# Patient Record
Sex: MALE | Race: WHITE | Employment: OTHER | URBAN - METROPOLITAN AREA
[De-identification: names, ages, dates, MRNs, and addresses within clinical notes are randomized per-mention and may not be internally consistent; named-entity substitution may affect disease eponyms.]

---

## 2017-03-31 ENCOUNTER — APPOINTMENT (OUTPATIENT)
Dept: INFUSION THERAPY | Age: 82
End: 2017-03-31

## 2017-07-19 ENCOUNTER — OFFICE VISIT (OUTPATIENT)
Dept: CARDIOLOGY CLINIC | Age: 82
End: 2017-07-19

## 2017-07-19 VITALS
SYSTOLIC BLOOD PRESSURE: 101 MMHG | DIASTOLIC BLOOD PRESSURE: 53 MMHG | BODY MASS INDEX: 26.95 KG/M2 | HEART RATE: 59 BPM | WEIGHT: 199 LBS | HEIGHT: 72 IN

## 2017-07-19 DIAGNOSIS — I10 UNSPECIFIED ESSENTIAL HYPERTENSION: ICD-10-CM

## 2017-07-19 DIAGNOSIS — R94.31 ABNORMAL EKG: ICD-10-CM

## 2017-07-19 DIAGNOSIS — I50.9 ACUTE ON CHRONIC CONGESTIVE HEART FAILURE, UNSPECIFIED CONGESTIVE HEART FAILURE TYPE: Primary | ICD-10-CM

## 2017-07-19 RX ORDER — GLUCOSAMINE SULFATE 1500 MG
POWDER IN PACKET (EA) ORAL DAILY
COMMUNITY

## 2017-07-19 RX ORDER — CITALOPRAM 20 MG/1
TABLET, FILM COATED ORAL DAILY
COMMUNITY
End: 2021-01-25 | Stop reason: ALTCHOICE

## 2017-07-19 RX ORDER — ALFUZOSIN HYDROCHLORIDE 10 MG/1
TABLET, EXTENDED RELEASE ORAL DAILY
COMMUNITY
End: 2021-03-31

## 2017-07-19 RX ORDER — LEVOTHYROXINE SODIUM 25 UG/1
25 TABLET ORAL
COMMUNITY

## 2017-07-19 RX ORDER — MILK THISTLE SEED EXTRACT 200 MG
CAPSULE ORAL
COMMUNITY
End: 2017-08-31

## 2017-07-19 RX ORDER — CHOLECALCIFEROL (VITAMIN D3) 125 MCG
100 CAPSULE ORAL DAILY
COMMUNITY

## 2017-07-19 RX ORDER — BIOTIN 1000 MCG
TABLET,CHEWABLE ORAL
COMMUNITY

## 2017-07-19 RX ORDER — GLUCOSAM/CHONDRO/HERB 149/HYAL 750-100 MG
TABLET ORAL
COMMUNITY
End: 2019-09-13 | Stop reason: ALTCHOICE

## 2017-07-19 RX ORDER — TESTOSTERONE 50 MG/5G
5 GEL TRANSDERMAL DAILY
COMMUNITY
End: 2019-09-13 | Stop reason: ALTCHOICE

## 2017-07-19 NOTE — PATIENT INSTRUCTIONS
There are no discontinued medications. Orders Placed This Encounter    SCHEDULE NUCLEAR STUDY     exercise    2D ECHO COMPLETE ADULT (TTE)     Standing Status:   Future     Standing Expiration Date:   1/15/2018     Order Specific Question:   Reason for Exam:     Answer:   chf    AMB POC EKG ROUTINE W/ 12 LEADS, INTER & REP     Order Specific Question:   Reason for Exam:     Answer:   htn          Avoiding Triggers With Heart Failure: Care Instructions  Your Care Instructions  Triggers are anything that make your heart failure flare up. A flare-up is also called \"sudden heart failure\" or \"acute heart failure. \" When you have a flare-up, fluid builds up in your lungs, and you have problems breathing. You might need to go to the hospital. By watching for changes in your condition and avoiding triggers, you can prevent heart failure flare-ups. Follow-up care is a key part of your treatment and safety. Be sure to make and go to all appointments, and call your doctor if you are having problems. It's also a good idea to know your test results and keep a list of the medicines you take. How can you care for yourself at home? Watch for changes in your weight and condition  · Weigh yourself without clothing at the same time each day. Record your weight. Call your doctor if you have sudden weight gain, such as more than 2 to 3 pounds in a day or 5 pounds in a week. (Your doctor may suggest a different range of weight gain.) A sudden weight gain may mean that your heart failure is getting worse. · Keep a daily record of your symptoms. Write down any changes in how you feel, such as new shortness of breath, cough, or problems eating. Also record if your ankles are more swollen than usual and if you feel more tired than usual. Note anything that you ate or did that could have triggered these changes. Limit sodium  Sodium causes your body to hold on to extra water. This may cause your heart failure symptoms to get worse. People get most of their sodium from processed foods. Fast food and restaurant meals also tend to be very high in sodium. · Your doctor may suggest that you limit sodium to 2,000 milligrams (mg) a day or less. That is less than 1 teaspoon of salt a day, including all the salt you eat in cooking or in packaged foods. · Read food labels on cans and food packages. They tell you how much sodium you get in one serving. Check the serving size. If you eat more than one serving, you are getting more sodium. · Be aware that sodium can come in forms other than salt, including monosodium glutamate (MSG), sodium citrate, and sodium bicarbonate (baking soda). MSG is often added to Asian food. You can sometimes ask for food without MSG or salt. · Slowly reducing salt will help you adjust to the taste. Take the salt shaker off the table. · Flavor your food with garlic, lemon juice, onion, vinegar, herbs, and spices instead of salt. Do not use soy sauce, steak sauce, onion salt, garlic salt, mustard, or ketchup on your food, unless it is labeled \"low-sodium\" or \"low-salt. \"  · Make your own salad dressings, sauces, and ketchup without adding salt. · Use fresh or frozen ingredients, instead of canned ones, whenever you can. Choose low-sodium canned goods. · Eat less processed food and food from restaurants, including fast food. Exercise as directed  Moderate, regular exercise is very good for your heart. It improves your blood flow and helps control your weight. But too much exercise can stress your heart and cause a heart failure flare-up. · Check with your doctor before you start an exercise program.  · Walking is an easy way to get exercise. Start out slowly. Gradually increase the length and pace of your walk. Swimming, riding a bike, and using a treadmill are also good forms of exercise. · When you exercise, watch for signs that your heart is working too hard.  You are pushing yourself too hard if you cannot talk while you are exercising. If you become short of breath or dizzy or have chest pain, stop, sit down, and rest.  · Do not exercise when you do not feel well. Take medicines correctly  · Take your medicines exactly as prescribed. Call your doctor if you think you are having a problem with your medicine. · Make a list of all the medicines you take. Include those prescribed to you by other doctors and any over-the-counter medicines, vitamins, or supplements you take. Take this list with you when you go to any doctor. · Take your medicines at the same time every day. It may help you to post a list of all the medicines you take every day and what time of day you take them. · Make taking your medicine as simple as you can. Plan times to take your medicines when you are doing other things, such as eating a meal or getting ready for bed. This will make it easier to remember to take your medicines. · Get organized. Use helpful tools, such as daily or weekly pill containers. When should you call for help? Call 911 if you have symptoms of sudden heart failure such as:  · You have severe trouble breathing. · You cough up pink, foamy mucus. · You have a new irregular or rapid heartbeat. Call your doctor now or seek immediate medical care if:  · You have new or increased shortness of breath. · You are dizzy or lightheaded, or you feel like you may faint. · You have sudden weight gain, such as more than 2 to 3 pounds in a day or 5 pounds in a week. (Your doctor may suggest a different range of weight gain.)  · You have increased swelling in your legs, ankles, or feet. · You are suddenly so tired or weak that you cannot do your usual activities. Watch closely for changes in your health, and be sure to contact your doctor if you develop new symptoms. Where can you learn more? Go to http://magda-tyra.info/.   Enter U433 in the search box to learn more about \"Avoiding Triggers With Heart Failure: Care Instructions. \"  Current as of: February 23, 2017  Content Version: 11.3  © 4516-2861 [employer], [facility]. Care instructions adapted under license by [employer] (which disclaims liability or warranty for this information). If you have questions about a medical condition or this instruction, always ask your healthcare professional. [place] [address] any warranty or liability for your use of this information.

## 2017-07-19 NOTE — MR AVS SNAPSHOT
Visit Information Date & Time Provider Department Dept. Phone Encounter #  
 7/19/2017 10:15 AM Lidia Roberson MD [facility] [address] [place] [address] [id number] Follow-up Instructions Return in about 1 month (around 8/19/2017), or if symptoms worsen or fail to improve, for post test.  
  
Your Appointments 8/18/2017  7:45 AM  
PROCEDURE with CA ECHO [facility] (Lenell Bake) Appt Note: vega with nuc; vega with nuc [address], [address] [place] [address]  
[address], [address] [facility] [address] [address] 8/18/2017  8:00 AM  
PROCEDURE with CA NUC [facility] (Lenell Bake) Appt Note: vega with echo wt 199 same day f/u  
 [address] [facility], [address] [place] [address]  
[address], [address] [address]  
  
    
 8/31/2017 11:45 AM  
ESTABLISHED PATIENT with Lidia Roberson MD  
[facility]) Appt Note: post nuc/echo [address], [address] [place] [address]  
[address], [address] [place] [address] [facility] Upcoming Health Maintenance Date Due DTaP/Tdap/Td series (1 - Tdap) 8/9/1956 ZOSTER VACCINE AGE 60> 8/9/1996 GLAUCOMA SCREENING Q2Y 8/9/2001 Pneumococcal 65+ Low/Medium Risk (1 of 2 - PCV13) 8/9/2001 MEDICARE YEARLY EXAM 8/9/2001 INFLUENZA AGE 9 TO ADULT 8/1/2017 Allergies as of 7/19/2017  Review Complete On: 7/19/2017 By: Lidia Roberson MD  
 No Known Allergies Current Immunizations  Never Reviewed No immunizations on file. Not reviewed this visit You Were Diagnosed With   
  
 Codes Comments Acute on chronic congestive heart failure, unspecified congestive heart failure type ([facility] [address]    -  Primary ICD-10-CM: I50.9 ICD-9-CM: 428.0 adv to stop all herbal OTC products for now  Abnormal EKG     ICD-10-CM: R94.31 
 ICD-9-CM: 80.32 poss old ant MI Unspecified essential hypertension     ICD-10-CM: I10 
ICD-9-CM: 401.9 Vitals BP Pulse Height(growth percentile) Weight(growth percentile) BMI Smoking Status 101/53 (!) 59 6' (1.829 m) 199 lb (90.3 kg) 26.99 kg/m2 Former Smoker Vitals History BMI and BSA Data Body Mass Index Body Surface Area  
 26.99 kg/m 2 2.14 m 2 Your Updated Medication List  
  
   
This list is accurate as of: 7/19/17 12:43 PM.  Always use your most recent med list.  
  
  
  
  
 alfuzosin SR 10 mg SR tablet Commonly known as:  Reese Gell Take  by mouth daily. biotin 1,000 mcg Chew Take  by mouth.  
  
 citalopram 20 mg tablet Commonly known as:  Linnell Laura Take  by mouth daily. CO Q-10 100 mg capsule Generic drug:  co-enzyme Q-10 Take 100 mg by mouth daily. RUDY-C PO Take  by mouth.  
  
 levothyroxine 25 mcg tablet Commonly known as:  SYNTHROID Take  by mouth Daily (before breakfast). milk thistle seed extract 200 mg Cap Take  by mouth. Omega-3-DHA-EPA-Fish Oil 1,000 mg (120 mg-180 mg) Cap Take  by mouth. testosterone 50 mg/5 gram (1 %) gel Commonly known as:  ANDROGEL  
5 g by TransDERmal route daily. VITAMIN D3 1,000 unit Cap Generic drug:  cholecalciferol Take  by mouth daily. zinc 50 mg Tab tablet Take  by mouth daily. We Performed the Following AMB POC EKG ROUTINE W/ 12 LEADS, INTER & REP [79037 CPT(R)] SCHEDULE NUCLEAR STUDY [[id number] Custom] Comments:  
 exercise Follow-up Instructions Return in about 1 month (around 8/19/2017), or if symptoms worsen or fail to improve, for post test.  
  
To-Do List   
 Around 07/22/2017 Cardiac Services:  2D ECHO COMPLETE ADULT (TTE) Patient Instructions There are no discontinued medications. Orders Placed This Encounter  SCHEDULE NUCLEAR STUDY  
  exercise  2D ECHO COMPLETE ADULT (TTE) Standing Status:   Future Standing Expiration Date:   1/15/2018 Order Specific Question:   Reason for Exam: Answer:   chf  
 AMB POC EKG ROUTINE W/ 12 LEADS, INTER & REP Order Specific Question:   Reason for Exam: Answer:   htn Avoiding Triggers With Heart Failure: Care Instructions Your Care Instructions Triggers are anything that make your heart failure flare up. A flare-up is also called \"sudden heart failure\" or \"acute heart failure. \" When you have a flare-up, fluid builds up in your lungs, and you have problems breathing. You might need to go to the hospital. By watching for changes in your condition and avoiding triggers, you can prevent heart failure flare-ups. Follow-up care is a key part of your treatment and safety. Be sure to make and go to all appointments, and call your doctor if you are having problems. It's also a good idea to know your test results and keep a list of the medicines you take. How can you care for yourself at home? Watch for changes in your weight and condition · Weigh yourself without clothing at the same time each day. Record your weight. Call your doctor if you have sudden weight gain, such as more than 2 to 3 pounds in a day or 5 pounds in a week. (Your doctor may suggest a different range of weight gain.) A sudden weight gain may mean that your heart failure is getting worse. · Keep a daily record of your symptoms. Write down any changes in how you feel, such as new shortness of breath, cough, or problems eating. Also record if your ankles are more swollen than usual and if you feel more tired than usual. Note anything that you ate or did that could have triggered these changes. Limit sodium Sodium causes your body to hold on to extra water. This may cause your heart failure symptoms to get worse. People get most of their sodium from processed foods. Fast food and restaurant meals also tend to be very high in sodium. · Your doctor may suggest that you limit sodium to 2,000 milligrams (mg) a day or less. That is less than 1 teaspoon of salt a day, including all the salt you eat in cooking or in packaged foods. · Read food labels on cans and food packages. They tell you how much sodium you get in one serving. Check the serving size. If you eat more than one serving, you are getting more sodium. · Be aware that sodium can come in forms other than salt, including monosodium glutamate (MSG), sodium citrate, and sodium bicarbonate (baking soda). MSG is often added to Asian food. You can sometimes ask for food without MSG or salt. · Slowly reducing salt will help you adjust to the taste. Take the salt shaker off the table. · Flavor your food with garlic, lemon juice, onion, vinegar, herbs, and spices instead of salt. Do not use soy sauce, steak sauce, onion salt, garlic salt, mustard, or ketchup on your food, unless it is labeled \"low-sodium\" or \"low-salt. \" 
· Make your own salad dressings, sauces, and ketchup without adding salt. · Use fresh or frozen ingredients, instead of canned ones, whenever you can. Choose low-sodium canned goods. · Eat less processed food and food from restaurants, including fast food. Exercise as directed Moderate, regular exercise is very good for your heart. It improves your blood flow and helps control your weight. But too much exercise can stress your heart and cause a heart failure flare-up. · Check with your doctor before you start an exercise program. 
· Walking is an easy way to get exercise. Start out slowly. Gradually increase the length and pace of your walk. Swimming, riding a bike, and using a treadmill are also good forms of exercise. · When you exercise, watch for signs that your heart is working too hard. You are pushing yourself too hard if you cannot talk while you are exercising.  If you become short of breath or dizzy or have chest pain, stop, sit down, and rest. 
 · Do not exercise when you do not feel well. Take medicines correctly · Take your medicines exactly as prescribed. Call your doctor if you think you are having a problem with your medicine. · Make a list of all the medicines you take. Include those prescribed to you by other doctors and any over-the-counter medicines, vitamins, or supplements you take. Take this list with you when you go to any doctor. · Take your medicines at the same time every day. It may help you to post a list of all the medicines you take every day and what time of day you take them. · Make taking your medicine as simple as you can. Plan times to take your medicines when you are doing other things, such as eating a meal or getting ready for bed. This will make it easier to remember to take your medicines. · Get organized. Use helpful tools, such as daily or weekly pill containers. When should you call for help? Call 911 if you have symptoms of sudden heart failure such as: 
· You have severe trouble breathing. · You cough up pink, foamy mucus. · You have a new irregular or rapid heartbeat. Call your doctor now or seek immediate medical care if: 
· You have new or increased shortness of breath. · You are dizzy or lightheaded, or you feel like you may faint. · You have sudden weight gain, such as more than 2 to 3 pounds in a day or 5 pounds in a week. (Your doctor may suggest a different range of weight gain.) · You have increased swelling in your legs, ankles, or feet. · You are suddenly so tired or weak that you cannot do your usual activities. Watch closely for changes in your health, and be sure to contact your doctor if you develop new symptoms. Where can you learn more? Go to http://magda-tyra.info/. Enter U637 in the search box to learn more about \"Avoiding Triggers With Heart Failure: Care Instructions. \" Current as of: February 23, 2017 Content Version: 11.3 © 3993-8911 [employer]. Care instructions adapted under license by [employer] (which disclaims liability or warranty for this information). If you have questions about a medical condition or this instruction, always ask your healthcare professional. [address] any warranty or liability for your use of this information. Introducing [facility]! [facility] introduces Fooooo patient portal. Now you can access parts of your medical record, email your doctor's office, and request medication refills online. 1. In your internet browser, go to [email or web address] 2. Click on the First Time User? Click Here link in the Sign In box. You will see the New Member Sign Up page. 3. Enter your Fooooo Access Code exactly as it appears below. You will not need to use this code after youve completed the sign-up process. If you do not sign up before the expiration date, you must request a new code. · Fooooo Access Code: [email or web address] Expires: 10/17/2017 11:14 AM 
 
4. Enter the last four digits of your Social Security Number (xxxx) and Date of Birth (mm/dd/yyyy) as indicated and click Submit. You will be taken to the next sign-up page. 5. Create a Fooooo ID. This will be your Fooooo login ID and cannot be changed, so think of one that is secure and easy to remember. 6. Create a Fooooo password. You can change your password at any time. 7. Enter your Password Reset Question and Answer. This can be used at a later time if you forget your password. 8. Enter your e-mail address. You will receive e-mail notification when new information is available in [address] 9. Click Sign Up. You can now view and download portions of your medical record. 10. Click the Download Summary menu link to download a portable copy of your medical information.  
 
If you have questions, please visit the Frequently Asked Questions section of the Managed Systems. Remember, [employer] is NOT to be used for urgent needs. For medical emergencies, dial 911. Now available from your iPhone and Android! Please provide this summary of care documentation to your next provider. Your primary care clinician is listed as Renee Carvalho. If you have any questions after today's visit, please call [phone].

## 2017-07-19 NOTE — PROGRESS NOTES
HISTORY OF PRESENT ILLNESS  Kt Schneider is a [de-identified] y.o. male. New Patient   The history is provided by the patient. Associated symptoms include shortness of breath. Pertinent negatives include no chest pain and no headaches. Fatigue   The history is provided by the patient. This is a new problem. The current episode started more than 1 week ago (yrs). The problem occurs daily. The problem has been gradually worsening (1/17). Associated symptoms include shortness of breath. Pertinent negatives include no chest pain and no headaches. The symptoms are aggravated by walking (100-200 steps). The symptoms are relieved by rest. He has tried nothing for the symptoms. Leg Swelling   The history is provided by the patient. This is a new problem. The current episode started more than 1 week ago (2015). The problem occurs every several days. Associated symptoms include shortness of breath. Pertinent negatives include no chest pain and no headaches. The symptoms are aggravated by standing. The symptoms are relieved by sleep. He has tried nothing for the symptoms. Shortness of Breath   The history is provided by the patient. This is a new problem. The problem occurs intermittently. The current episode started more than 1 week ago. Associated symptoms include leg swelling. Pertinent negatives include no fever, no headaches, no cough, no wheezing, no PND, no orthopnea, no chest pain, no vomiting, no rash and no claudication. The problem's precipitants include exercise (25 squatts). Review of Systems   Constitutional: Positive for fatigue and malaise/fatigue. Negative for chills, fever and weight loss. HENT: Negative for nosebleeds. Eyes: Negative for discharge. Respiratory: Positive for shortness of breath. Negative for cough and wheezing. Cardiovascular: Positive for leg swelling. Negative for chest pain, palpitations, orthopnea, claudication and PND.    Gastrointestinal: Negative for diarrhea, nausea and vomiting. Genitourinary: Negative for dysuria and hematuria. Musculoskeletal: Negative for joint pain. Skin: Negative for rash. Neurological: Negative for dizziness, seizures, loss of consciousness and headaches. Endo/Heme/Allergies: Negative for polydipsia. Does not bruise/bleed easily. Psychiatric/Behavioral: Negative for depression and substance abuse. The patient does not have insomnia. No Known Allergies    History reviewed. No pertinent past medical history. Family History   Problem Relation Age of Onset    Heart Attack Neg Hx     Stroke Neg Hx        Social History   Substance Use Topics    Smoking status: Former Smoker     Quit date: 7/19/1982    Smokeless tobacco: Never Used    Alcohol use No        Current Outpatient Prescriptions   Medication Sig    levothyroxine (SYNTHROID) 25 mcg tablet Take  by mouth Daily (before breakfast).  citalopram (CELEXA) 20 mg tablet Take  by mouth daily.  alfuzosin SR (UROXATRAL) 10 mg SR tablet Take  by mouth daily.  testosterone (ANDROGEL) 50 mg/5 gram (1 %) gel 5 g by TransDERmal route daily.  milk thistle seed extract 200 mg cap Take  by mouth.  Omega-3-DHA-EPA-Fish Oil 1,000 mg (120 mg-180 mg) cap Take  by mouth.  biotin 1,000 mcg chew Take  by mouth.  zinc 50 mg tab tablet Take  by mouth daily.  ASCORBATE CALCIUM (RUDY-C PO) Take  by mouth.  cholecalciferol (VITAMIN D3) 1,000 unit cap Take  by mouth daily.  co-enzyme Q-10 (CO Q-10) 100 mg capsule Take 100 mg by mouth daily. No current facility-administered medications for this visit. Past Surgical History:   Procedure Laterality Date    HX BACK SURGERY  1971       Visit Vitals    /53    Pulse (!) 59    Ht 6' (1.829 m)    Wt 90.3 kg (199 lb)    BMI 26.99 kg/m2       Diagnostic Studies:  I have reviewed the relevant tests done on the patient and show as follows  EKG tracings reviewed by me today. No flowsheet data found.     Mr. Vincent Beckman has a reminder for a \"due or due soon\" health maintenance. I have asked that he contact his primary care provider for follow-up on this health maintenance. Physical Exam   Constitutional: He is oriented to person, place, and time. He appears well-developed and well-nourished. No distress. HENT:   Head: Normocephalic and atraumatic. Mouth/Throat: Normal dentition. Eyes: Right eye exhibits no discharge. Left eye exhibits no discharge. No scleral icterus. Neck: Neck supple. No JVD present. Carotid bruit is not present. No thyromegaly present. Cardiovascular: Normal rate, regular rhythm, S1 normal, S2 normal, normal heart sounds and intact distal pulses. Exam reveals no gallop and no friction rub. No murmur heard. Pulmonary/Chest: Effort normal and breath sounds normal. He has no wheezes. He has no rales. Abdominal: Soft. He exhibits no mass. There is no tenderness. Musculoskeletal: He exhibits edema (slight puffy b/l). Lymphadenopathy:        Right cervical: No superficial cervical adenopathy present. Left cervical: No superficial cervical adenopathy present. Neurological: He is alert and oriented to person, place, and time. Skin: Skin is warm and dry. No rash noted. Psychiatric: He has a normal mood and affect. His behavior is normal.       ASSESSMENT and Gavi Littlejohn was seen today for new patient and fatigue. Diagnoses and all orders for this visit:    Acute on chronic congestive heart failure, unspecified congestive heart failure type ([address]  Comments:  adv to stop all herbal OTC products for now  Orders:  -     2D ECHO COMPLETE ADULT (TTE); Future  -     SCHEDULE NUCLEAR STUDY    Abnormal EKG  Comments:  poss old ant MI  Orders:  -     SCHEDULE NUCLEAR STUDY    Unspecified essential hypertension  -     AMB POC EKG ROUTINE W/ 12 LEADS, INTER & REP        Pertinent laboratory and test data reviewed and discussed with patient.   See patient instructions also for other medical advice given    There are no discontinued medications.     Follow-up Disposition:  Return in about 1 month (around 8/19/2017), or if symptoms worsen or fail to improve, for post test.

## 2017-07-19 NOTE — LETTER
Crystal Jordan 1935 7/19/2017 Dear Bianca Sawyer MD 
 
I had the pleasure of evaluating  Mr. Irene Reynaga in office today. Below are the relevant portions of my assessment and plan of care. ICD-10-CM ICD-9-CM 1. Acute on chronic congestive heart failure, unspecified congestive heart failure type (HCC) I50.9 428.0 2D ECHO COMPLETE ADULT (TTE) SCHEDULE NUCLEAR STUDY  
 adv to stop all herbal OTC products for now 2. Abnormal EKG R94.31 794.31 SCHEDULE NUCLEAR STUDY poss old ant MI  
3. Unspecified essential hypertension I10 401.9 AMB POC EKG ROUTINE W/ 12 LEADS, INTER & REP Current Outpatient Prescriptions Medication Sig Dispense Refill  levothyroxine (SYNTHROID) 25 mcg tablet Take  by mouth Daily (before breakfast).  citalopram (CELEXA) 20 mg tablet Take  by mouth daily.  alfuzosin SR (UROXATRAL) 10 mg SR tablet Take  by mouth daily.  testosterone (ANDROGEL) 50 mg/5 gram (1 %) gel 5 g by TransDERmal route daily.  milk thistle seed extract 200 mg cap Take  by mouth.  Omega-3-DHA-EPA-Fish Oil 1,000 mg (120 mg-180 mg) cap Take  by mouth.  biotin 1,000 mcg chew Take  by mouth.  zinc 50 mg tab tablet Take  by mouth daily.  ASCORBATE CALCIUM (RUDY-C PO) Take  by mouth.  cholecalciferol (VITAMIN D3) 1,000 unit cap Take  by mouth daily.  co-enzyme Q-10 (CO Q-10) 100 mg capsule Take 100 mg by mouth daily. Orders Placed This Encounter  SCHEDULE NUCLEAR STUDY  
  exercise  2D ECHO COMPLETE ADULT (TTE) Standing Status:   Future Standing Expiration Date:   1/15/2018 Order Specific Question:   Reason for Exam: Answer:   chf  
 AMB POC EKG ROUTINE W/ 12 LEADS, INTER & REP Order Specific Question:   Reason for Exam: Answer:   htn  levothyroxine (SYNTHROID) 25 mcg tablet Sig: Take  by mouth Daily (before breakfast).  citalopram (CELEXA) 20 mg tablet Sig: Take  by mouth daily.  alfuzosin SR (UROXATRAL) 10 mg SR tablet Sig: Take  by mouth daily.  testosterone (ANDROGEL) 50 mg/5 gram (1 %) gel Si g by TransDERmal route daily.  milk thistle seed extract 200 mg cap Sig: Take  by mouth.  Omega-3-DHA-EPA-Fish Oil 1,000 mg (120 mg-180 mg) cap Sig: Take  by mouth.  biotin 1,000 mcg chew Sig: Take  by mouth.  zinc 50 mg tab tablet Sig: Take  by mouth daily.  ASCORBATE CALCIUM (RUDY-C PO) Sig: Take  by mouth.  cholecalciferol (VITAMIN D3) 1,000 unit cap Sig: Take  by mouth daily.  co-enzyme Q-10 (CO Q-10) 100 mg capsule Sig: Take 100 mg by mouth daily. If you have questions, please do not hesitate to call me. I look forward to following Mr. Berhane Espino along with you. Sincerely, Armin Lai MD

## 2017-08-18 ENCOUNTER — CLINICAL SUPPORT (OUTPATIENT)
Dept: CARDIOLOGY CLINIC | Age: 82
End: 2017-08-18

## 2017-08-18 DIAGNOSIS — R94.31 ABNORMAL EKG: ICD-10-CM

## 2017-08-18 DIAGNOSIS — I50.9 ACUTE ON CHRONIC CONGESTIVE HEART FAILURE, UNSPECIFIED CONGESTIVE HEART FAILURE TYPE: Primary | ICD-10-CM

## 2017-08-18 DIAGNOSIS — I50.9 ACUTE ON CHRONIC CONGESTIVE HEART FAILURE, UNSPECIFIED CONGESTIVE HEART FAILURE TYPE: ICD-10-CM

## 2017-08-18 NOTE — PROGRESS NOTES
[facility]  [address], [address] [place] [address], [place], [address] [place] [address]  [phone]      Name: Maryann Aschoff         MRN#: [id number]      YOB: 1935     Gender: male Ht:6 \" Wt:199 lbs            Date of Rest/Stress Images: 8/18/2017   Referring Provider: Rusty Park MD  Ordering Provider: Karla Felder. Stalin Damon MD, [facility] [place]  Technologist: Neo Nina. Nimesh NEWMAN, C.N.M.T. Diagnosis:   1. Acute on chronic congestive heart failure, unspecified congestive heart failure type ([address]    2. Abnormal EKG          Rest/Stress Myoview SPECT Myocardial Perfusion Imaging with  Exercise / Lexiscan Stress and Gated SPECT Imaging      PROCEDURE:      Myocardial perfusion imaging was performed at rest approximately 30 mins following the intravenous injection,(Right hand ) of 12.1 mCi of Tc99m Myoview for evaluation of myocardial function and perfusion at rest.     Baseline:   Heart rate 56. Blood pressure 120/52  EKG shows sinus rhythm, right bundle branch block and left anterior hemiblock. Exercise/Lexiscan data:  Patient exercised using standard Willi protocol. Patient exercised for a total of 1 minutes and 17 seconds achieving 3.5 METS. Exercise was stopped due to fatigue at patient's request.  Max heart rate is 89. As the heart rate was relatively low, this test was switched to pharmacologic and 0.4 mg of Lexiscan given intravenously during leg exercises in the recliner. Heart rate post Lexiscan is 93 blood pressure is 148/60 EKG has no new changes. Conclusions :  1. No ischemic ST-T changes up to a heart rate of 99 or after Lexiscan infusion. 2.  Moderate to severely reduced functional capacity. 3.  No symptoms or arrhythmias with exercise or after Lexiscan infusion  4. Appropriate heart rate and blood pressure response.   5.  Perfusion images report to follow      Pharmacological:  Patient was injected with .4 mg/mL with Lexiscan intravenously over a period 10 to 20 sec. After pharmacologic stress, the patient was injected intravenously with 38.8 mCi of Tc99m Myoview. Gating post stress tomographic imaging performed approximately 45 minutes post tracer injection. The data was reconstructed in the short, horizontal long and vertical long axis views and tomographic slices were generated. NUCLEAR IMAGING:     Findings:   1. Stress images reveal mildy reduced Myoview uptake in a small area of inferobasal segments seen in the short axis and vertical long axis views. 2. Resting images have no evidence of redistribution in the inferobasal segment. 3. Gated images reveal normal wall motion and ejection fraction is calculated at 73%. Conclusion:   1. Normal scan. 2. Evidence of a fixed inferobasal defect is most likely due to diaphragmatic attenuation in this patient. 3. Normal wall motion and preserved ejection fraction. 4. Low risk scan. Thank you for the referral.    E-signed and Interpreting Physician:    Latrelle Severin.  Terry Peralta MD, [facility] [place]     Date of interpretation: 8/18/2017  Date of final report: 8/18/2017

## 2017-08-31 ENCOUNTER — OFFICE VISIT (OUTPATIENT)
Dept: CARDIOLOGY CLINIC | Age: 82
End: 2017-08-31

## 2017-08-31 VITALS
WEIGHT: 200 LBS | HEART RATE: 60 BPM | BODY MASS INDEX: 27.09 KG/M2 | SYSTOLIC BLOOD PRESSURE: 100 MMHG | HEIGHT: 72 IN | DIASTOLIC BLOOD PRESSURE: 53 MMHG

## 2017-08-31 DIAGNOSIS — I50.33 ACUTE ON CHRONIC DIASTOLIC CONGESTIVE HEART FAILURE (HCC): Primary | ICD-10-CM

## 2017-08-31 DIAGNOSIS — G47.9 SLEEP DISORDER: ICD-10-CM

## 2017-08-31 RX ORDER — ASPIRIN 81 MG/1
81 TABLET ORAL DAILY
Qty: 50 TAB
Start: 2017-08-31 | End: 2019-09-13 | Stop reason: ALTCHOICE

## 2017-08-31 NOTE — MR AVS SNAPSHOT
Visit Information Date & Time Provider Department Dept. Phone Encounter #  
 8/31/2017 11:00 AM Latricia Lamar MD [facility] [address] [facility] [id number] Follow-up Instructions Return in about 1 year (around 8/31/2018), or if symptoms worsen or fail to improve, for with ekg. Your Appointments 8/30/2018 10:30 AM  
ESTABLISHED PATIENT with Latricia Lamar MD  
[facility]) Appt Note: 1 yr  
 [address], [address] [facility] [address]  
[address], [address] [place] [address] Upcoming Health Maintenance Date Due DTaP/Tdap/Td series (1 - Tdap) 8/9/1957 ZOSTER VACCINE AGE 60> 6/9/1996 GLAUCOMA SCREENING Q2Y 8/9/2001 Pneumococcal 65+ Low/Medium Risk (1 of 2 - PCV13) 8/9/2001 MEDICARE YEARLY EXAM 8/9/2001 INFLUENZA AGE 9 TO ADULT 8/1/2017 Allergies as of 8/31/2017  Review Complete On: 8/31/2017 By: Latricia Lamar MD  
 No Known Allergies Current Immunizations  Never Reviewed No immunizations on file. Not reviewed this visit You Were Diagnosed With   
  
 Codes Comments Acute on chronic diastolic congestive heart failure ([facility] [address]    -  Primary ICD-10-CM: I50.33 ICD-9-CM: 428.33, 428.0 8/17 no edema today; 7/17 adv to stop all herbal OTC products for now 
try asa 81 mg/day Sleep disorder     ICD-10-CM: G47.9 ICD-9-CM: 780.50 done home sleep monitor 
further w/u per PCP Vitals BP Pulse Height(growth percentile) Weight(growth percentile) BMI Smoking Status 100/53 60 6' (1.829 m) 200 lb (90.7 kg) 27.12 kg/m2 Former Smoker Vitals History BMI and BSA Data Body Mass Index Body Surface Area  
 27.12 kg/m 2 2.15 m 2 Your Updated Medication List  
  
   
This list is accurate as of: 8/31/17 12:16 PM.  Always use your most recent med list.  
  
  
  
  
 alfuzosin SR 10 mg SR tablet Commonly known as:  Maurizio Edwards  
 Take  by mouth daily. aspirin delayed-release 81 mg tablet Take 1 Tab by mouth daily. biotin 1,000 mcg Chew Take  by mouth.  
  
 citalopram 20 mg tablet Commonly known as:  Monna Roch Take  by mouth daily. CO Q-10 100 mg capsule Generic drug:  co-enzyme Q-10 Take 100 mg by mouth daily. RUDY-C PO Take  by mouth.  
  
 levothyroxine 25 mcg tablet Commonly known as:  SYNTHROID Take  by mouth Daily (before breakfast). Omega-3-DHA-EPA-Fish Oil 1,000 mg (120 mg-180 mg) Cap Take  by mouth. testosterone 50 mg/5 gram (1 %) gel Commonly known as:  ANDROGEL  
5 g by TransDERmal route daily. VITAMIN D3 1,000 unit Cap Generic drug:  cholecalciferol Take  by mouth daily. zinc 50 mg Tab tablet Take  by mouth daily. Follow-up Instructions Return in about 1 year (around 8/31/2018), or if symptoms worsen or fail to improve, for with ekg. Patient Instructions Medications Discontinued During This Encounter Medication Reason  milk thistle seed extract 200 mg cap Not A Current Medication Orders Placed This Encounter  aspirin delayed-release 81 mg tablet Sig: Take 1 Tab by mouth daily. Dispense:  50 Tab Refill:  prn A Healthy Heart: Care Instructions Your Care Instructions Heart disease occurs when a substance called plaque builds up in the vessels that supply oxygen-rich blood to your heart. This can narrow the blood vessels and reduce blood flow. A heart attack happens when blood flow is completely blocked. A high-fat diet, smoking, and other factors increase the risk of heart disease. Your doctor has found that you have a chance of having heart disease. You can do lots of things to keep your heart healthy. It may not be easy, but you can change your diet, exercise more, and quit smoking. These steps really work to lower your chance of heart disease. Follow-up care is a key part of your treatment and safety. Be sure to make and go to all appointments, and call your doctor if you are having problems. It's also a good idea to know your test results and keep a list of the medicines you take. How can you care for yourself at home? Diet · Use less salt when you cook and eat. This helps lower your blood pressure. Taste food before salting. Add only a little salt when you think you need it. With time, your taste buds will adjust to less salt. · Eat fewer snack items, fast foods, canned soups, and other high-salt, high-fat, processed foods. · Read food labels and try to avoid saturated and trans fats. They increase your risk of heart disease by raising cholesterol levels. · Limit the amount of solid fat-butter, margarine, and shortening-you eat. Use olive, peanut, or canola oil when you cook. Bake, broil, and steam foods instead of frying them. · Eating fish can lower your risk for heart disease. Eat at least 2 servings of fish a week. [place], mackerel, herring, sardines, and chunk light tuna are very good choices. These fish contain omega-3 fatty acids. · Eat a variety of fruit and vegetables every day. Dark green, deep orange, red, or yellow fruits and vegetables are especially good for you. Examples include spinach, carrots, peaches, and berries. · Foods high in fiber can reduce your cholesterol and provide important vitamins and minerals. High-fiber foods include whole-grain cereals and breads, oatmeal, beans, brown rice, citrus fruits, and apples. · Limit drinks and foods with added sugar. These include candy, desserts, and soda pop. Lifestyle changes · If your doctor recommends it, get more exercise. Walking is a good choice. Bit by bit, increase the amount you walk every day. Try for at least 30 minutes on most days of the week. You also may want to swim, bike, or do other activities. · Do not smoke. If you need help quitting, talk to your doctor about stop-smoking programs and medicines. These can increase your chances of quitting for good. Quitting smoking may be the most important step you can take to protect your heart. It is never too late to quit. You will get health benefits right away. · Limit alcohol to 2 drinks a day for men and 1 drink a day for women. Too much alcohol can cause health problems. Medicines · Take your medicines exactly as prescribed. Call your doctor if you think you are having a problem with your medicine. · If your doctor recommends aspirin, take the amount directed each day. Make sure you take aspirin and not another kind of pain reliever, such as acetaminophen (Tylenol). If you take ibuprofen (such as Advil or Motrin) for other problems, take aspirin at least 2 hours before taking ibuprofen. When should you call for help? Call 911 if you have symptoms of a heart attack. These may include: 
· You have chest pain or pressure. This may occur with: 
¨ Chest pain or pressure, or a strange feeling in the chest. 
¨ Sweating. ¨ Shortness of breath. ¨ Pain, pressure, or a strange feeling in the back, neck, jaw, or upper belly or in one or both shoulders or arms. ¨ Lightheadedness or sudden weakness. ¨ A fast or irregular heartbeat. After you call 911, the  may tell you to chew 1 adult-strength or 2 to 4 low-dose aspirin. Wait for an ambulance. Do not try to drive yourself. Watch closely for changes in your health, and be sure to contact your doctor if: 
· Your symptoms are slowly getting worse. · You do not get better as expected. Where can you learn more? Go to http://magda-tyra.info/. Enter Q355 in the search box to learn more about \"A Healthy Heart: Care Instructions. \" Current as of: April 3, 2017 Content Version: 11.3 © 5261-1380 [employer].  Care instructions adapted under license by [address] (which disclaims liability or warranty for this information). If you have questions about a medical condition or this instruction, always ask your healthcare professional. [address] any warranty or liability for your use of this information. Introducing hospitals & HEALTH SERVICES! Margarito Tory introduces [employer]gerTime patient portal. Now you can access parts of your medical record, email your doctor's office, and request medication refills online. 1. In your internet browser, go to [email or web address] 2. Click on the First Time User? Click Here link in the Sign In box. You will see the New Member Sign Up page. 3. Enter your [employer]gerTime Access Code exactly as it appears below. You will not need to use this code after youve completed the sign-up process. If you do not sign up before the expiration date, you must request a new code. · [employer]ger[employer] Access Code: [email or web address] Expires: 10/17/2017 11:14 AM 
 
4. Enter the last four digits of your Social Security Number (xxxx) and Date of Birth (mm/dd/yyyy) as indicated and click Submit. You will be taken to the next sign-up page. 5. Create a HungerTime ID. This will be your HungerTime login ID and cannot be changed, so think of one that is secure and easy to remember. 6. Create a HungerTime password. You can change your password at any time. 7. Enter your Password Reset Question and Answer. This can be used at a later time if you forget your password. 8. Enter your e-mail address. You will receive e-mail notification when new information is available in [address] 9. Click Sign Up. You can now view and download portions of your medical record. 10. Click the Download Summary menu link to download a portable copy of your medical information. If you have questions, please visit the Frequently Asked Questions section of the [employer]ger[employer] website.  Remember, [employer]Time is NOT to be used for urgent needs. For medical emergencies, dial 911. Now available from your iPhone and Android! Please provide this summary of care documentation to your next provider. Your primary care clinician is listed as Mary Grace Garcia. If you have any questions after today's visit, please call [phone].

## 2017-08-31 NOTE — PROGRESS NOTES
Patient didn't bring medications, verbally reviewed    8/17 Nuclear Stress Test  Narrative   Conclusions :  1. No ischemic ST-T changes up to a heart rate of 99 or after Lexiscan infusion. 2.  Moderate to severely reduced functional capacity. 3.  No symptoms or arrhythmias with exercise or after Lexiscan infusion  4. Appropriate heart rate and blood pressure response. 5.  Perfusion images report to follow   Impression   . Stress images reveal mildy reduced Myoview uptake in a small area of inferobasal segments seen in the short axis and vertical long axis views. 2. Resting images have no evidence of redistribution in the inferobasal segment. 3. Gated images reveal normal wall motion and ejection fraction is calculated at 73%. Conclusion:   1. Normal scan. 2. Evidence of a fixed inferobasal defect is most likely due to diaphragmatic attenuation in this patient. 3. Normal wall motion and preserved ejection fraction. 4. Low risk scan. 8/17 Echocardiogram  SUMMARY:  Left ventricle: Systolic function was normal. Ejection fraction was  estimated to be 55 %. There were no regional wall motion abnormalities. There was mild concentric hypertrophy. Doppler parameters were consistent  with abnormal left ventricular relaxation (grade 1 diastolic dysfunction). Mitral valve: There was trivial regurgitation. Tricuspid valve: There was mild regurgitation. COMPARISONS:  The previous study was not available for direct comparison. 1. Have you been to the ER, urgent care clinic since your last visit? Hospitalized since your last visit? NO    2. Have you seen or consulted any other health care providers outside of the [address] [place] [address] since your last visit? Include any pap smears or colon screening. No     3. Since your last visit, have you had any of the following symptoms? shortness of breath, dizziness and swelling in legs/arms.      4.  Have you had any blood work, X-rays or cardiac testing? No    5. Where do you normally have your labs drawn? PCP    6. Do you need any refills today?    NO

## 2017-08-31 NOTE — PROGRESS NOTES
HISTORY OF PRESENT ILLNESS  Anna Mejía is a 81 y.o. male. CHF   The history is provided by the medical records. This is a chronic problem. Associated symptoms include shortness of breath. Pertinent negatives include no chest pain and no headaches. Fatigue   The history is provided by the patient. This is a new problem. The current episode started more than 1 week ago (yrs). The problem occurs daily. The problem has not changed (1/17) since onset. Associated symptoms include shortness of breath. Pertinent negatives include no chest pain and no headaches. The symptoms are aggravated by walking (100-200 steps). The symptoms are relieved by rest. He has tried nothing for the symptoms. Leg Swelling   The history is provided by the patient. This is a new problem. The current episode started more than 1 week ago (2015). The problem occurs every several days. The problem has not changed since onset. Associated symptoms include shortness of breath. Pertinent negatives include no chest pain and no headaches. The symptoms are aggravated by standing. The symptoms are relieved by sleep. He has tried nothing for the symptoms. Shortness of Breath   The history is provided by the patient. This is a new problem. The problem occurs intermittently. The current episode started more than 1 week ago. The problem has not changed since onset. Associated symptoms include leg swelling. Pertinent negatives include no fever, no headaches, no cough, no wheezing, no PND, no orthopnea, no chest pain, no vomiting, no rash and no claudication. The problem's precipitants include exercise (25 squatts in batches of 5-10). Review of Systems   Constitutional: Positive for fatigue and malaise/fatigue. Negative for chills, fever and weight loss. HENT: Negative for nosebleeds. Eyes: Negative for discharge. Respiratory: Positive for shortness of breath. Negative for cough and wheezing. Cardiovascular: Positive for leg swelling. Negative for chest pain, palpitations, orthopnea, claudication and PND. Gastrointestinal: Negative for diarrhea, nausea and vomiting. Genitourinary: Negative for dysuria and hematuria. Musculoskeletal: Negative for joint pain. Skin: Negative for rash. Neurological: Negative for dizziness, seizures, loss of consciousness and headaches. Endo/Heme/Allergies: Negative for polydipsia. Does not bruise/bleed easily. Psychiatric/Behavioral: Negative for depression and substance abuse. The patient does not have insomnia. No Known Allergies    Past Medical History:   Diagnosis Date    Unspecified essential hypertension 7/19/2017       Family History   Problem Relation Age of Onset    Heart Attack Neg Hx     Stroke Neg Hx        Social History   Substance Use Topics    Smoking status: Former Smoker     Quit date: 7/19/1982    Smokeless tobacco: Never Used    Alcohol use No        Current Outpatient Prescriptions   Medication Sig    levothyroxine (SYNTHROID) 25 mcg tablet Take  by mouth Daily (before breakfast).  citalopram (CELEXA) 20 mg tablet Take  by mouth daily.  alfuzosin SR (UROXATRAL) 10 mg SR tablet Take  by mouth daily.  testosterone (ANDROGEL) 50 mg/5 gram (1 %) gel 5 g by TransDERmal route daily.  Omega-3-DHA-EPA-Fish Oil 1,000 mg (120 mg-180 mg) cap Take  by mouth.  biotin 1,000 mcg chew Take  by mouth.  zinc 50 mg tab tablet Take  by mouth daily.  ASCORBATE CALCIUM (RUDY-C PO) Take  by mouth.  cholecalciferol (VITAMIN D3) 1,000 unit cap Take  by mouth daily.  co-enzyme Q-10 (CO Q-10) 100 mg capsule Take 100 mg by mouth daily. No current facility-administered medications for this visit.          Past Surgical History:   Procedure Laterality Date    HX BACK SURGERY  1971       Visit Vitals    /53    Pulse 60    Ht 6' (1.829 m)    Wt 90.7 kg (200 lb)    BMI 27.12 kg/m2       Diagnostic Studies:  I have reviewed the relevant tests done on the patient and show as follows  EKG tracings reviewed by me today. No flowsheet data found. 8/17 Nuclear Stress Test  Narrative   Conclusions :  1. No ischemic ST-T changes up to a heart rate of 99 or after Lexiscan infusion. 2.  Moderate to severely reduced functional capacity. 3.  No symptoms or arrhythmias with exercise or after Lexiscan infusion  4. Appropriate heart rate and blood pressure response. 5.  Perfusion images report to follow   Impression   Conclusion:   1. Normal scan. 2. Evidence of a fixed inferobasal defect is most likely due to diaphragmatic attenuation in this patient. 3. Normal wall motion and preserved ejection fraction. 4. Low risk scan. 8/17 Echocardiogram  SUMMARY:  Left ventricle: Systolic function was normal. Ejection fraction was  estimated to be 55 %. There were no regional wall motion abnormalities. There was mild concentric hypertrophy. Doppler parameters were consistent  with abnormal left ventricular relaxation (grade 1 diastolic dysfunction). Mitral valve: There was trivial regurgitation. Tricuspid valve: There was mild regurgitation. Mr. Marvin Alan has a reminder for a \"due or due soon\" health maintenance. I have asked that he contact his primary care provider for follow-up on this health maintenance. Physical Exam   Constitutional: He is oriented to person, place, and time. He appears well-developed and well-nourished. No distress. HENT:   Head: Normocephalic and atraumatic. Mouth/Throat: Normal dentition. Eyes: Right eye exhibits no discharge. Left eye exhibits no discharge. No scleral icterus. Neck: Neck supple. No JVD present. Carotid bruit is not present. No thyromegaly present. Cardiovascular: Normal rate, regular rhythm, S1 normal, S2 normal, normal heart sounds and intact distal pulses. Exam reveals no gallop and no friction rub. No murmur heard. Pulmonary/Chest: Effort normal and breath sounds normal. He has no wheezes.  He has no rales.   Abdominal: Soft. He exhibits no mass. There is no tenderness. Musculoskeletal: He exhibits edema (slight puffy b/l). Lymphadenopathy:        Right cervical: No superficial cervical adenopathy present. Left cervical: No superficial cervical adenopathy present. Neurological: He is alert and oriented to person, place, and time. Skin: Skin is warm and dry. No rash noted. Psychiatric: He has a normal mood and affect. His behavior is normal.       ASSESSMENT and PLAN        Diagnoses and all orders for this visit:    1. Acute on chronic diastolic congestive heart failure ([facility] [address]  Comments:  8/17 no edema today; 7/17 adv to stop all herbal OTC products for now  try asa 81 mg/day    2. Sleep disorder  Comments:  done home sleep monitor  further w/u per PCP    Other orders  -     aspirin delayed-release 81 mg tablet; Take 1 Tab by mouth daily. Pertinent laboratory and test data reviewed and discussed with patient. See patient instructions also for other medical advice given    Medications Discontinued During This Encounter   Medication Reason    milk thistle seed extract 200 mg cap Not A Current Medication       Follow-up Disposition:  Return in about 1 year (around 8/31/2018), or if symptoms worsen or fail to improve, for with ekg.

## 2017-08-31 NOTE — LETTER
Amie Orin 1935 8/31/2017 Dear Kellen Gabriel MD 
 
I had the pleasure of evaluating  Mr. Rossy Cedillo in office today. Below are the relevant portions of my assessment and plan of care. ICD-10-CM ICD-9-CM 1. Acute on chronic diastolic congestive heart failure ([facility] [address] I50.33 428.33   
  428.0   
 8/17 no edema today; 7/17 adv to stop all herbal OTC products for now 
try asa 81 mg/day 2. Sleep disorder G47.9 780.50   
 done home sleep monitor 
further w/u per PCP Current Outpatient Prescriptions Medication Sig Dispense Refill  aspirin delayed-release 81 mg tablet Take 1 Tab by mouth daily. 50 Tab prn  levothyroxine (SYNTHROID) 25 mcg tablet Take  by mouth Daily (before breakfast).  citalopram (CELEXA) 20 mg tablet Take  by mouth daily.  alfuzosin SR (UROXATRAL) 10 mg SR tablet Take  by mouth daily.  testosterone (ANDROGEL) 50 mg/5 gram (1 %) gel 5 g by TransDERmal route daily.  Omega-3-DHA-EPA-Fish Oil 1,000 mg (120 mg-180 mg) cap Take  by mouth.  biotin 1,000 mcg chew Take  by mouth.  zinc 50 mg tab tablet Take  by mouth daily.  ASCORBATE CALCIUM (RUDY-C PO) Take  by mouth.  cholecalciferol (VITAMIN D3) 1,000 unit cap Take  by mouth daily.  co-enzyme Q-10 (CO Q-10) 100 mg capsule Take 100 mg by mouth daily. Orders Placed This Encounter  aspirin delayed-release 81 mg tablet Sig: Take 1 Tab by mouth daily. Dispense:  50 Tab Refill:  prn If you have questions, please do not hesitate to call me. I look forward to following Mr. Rossy Cedillo along with you. Sincerely, Jordan Carpenter MD

## 2017-08-31 NOTE — PATIENT INSTRUCTIONS
Medications Discontinued During This Encounter   Medication Reason    milk thistle seed extract 200 mg cap Not A Current Medication       Orders Placed This Encounter    aspirin delayed-release 81 mg tablet     Sig: Take 1 Tab by mouth daily. Dispense:  50 Tab     Refill:  prn          A Healthy Heart: Care Instructions  Your Care Instructions    Heart disease occurs when a substance called plaque builds up in the vessels that supply oxygen-rich blood to your heart. This can narrow the blood vessels and reduce blood flow. A heart attack happens when blood flow is completely blocked. A high-fat diet, smoking, and other factors increase the risk of heart disease. Your doctor has found that you have a chance of having heart disease. You can do lots of things to keep your heart healthy. It may not be easy, but you can change your diet, exercise more, and quit smoking. These steps really work to lower your chance of heart disease. Follow-up care is a key part of your treatment and safety. Be sure to make and go to all appointments, and call your doctor if you are having problems. It's also a good idea to know your test results and keep a list of the medicines you take. How can you care for yourself at home? Diet  · Use less salt when you cook and eat. This helps lower your blood pressure. Taste food before salting. Add only a little salt when you think you need it. With time, your taste buds will adjust to less salt. · Eat fewer snack items, fast foods, canned soups, and other high-salt, high-fat, processed foods. · Read food labels and try to avoid saturated and trans fats. They increase your risk of heart disease by raising cholesterol levels. · Limit the amount of solid fat-butter, margarine, and shortening-you eat. Use olive, peanut, or canola oil when you cook. Bake, broil, and steam foods instead of frying them. · Eating fish can lower your risk for heart disease.  Eat at least 2 servings of fish a week. [place], mackerel, herring, sardines, and chunk light tuna are very good choices. These fish contain omega-3 fatty acids. · Eat a variety of fruit and vegetables every day. Dark green, deep orange, red, or yellow fruits and vegetables are especially good for you. Examples include spinach, carrots, peaches, and berries. · Foods high in fiber can reduce your cholesterol and provide important vitamins and minerals. High-fiber foods include whole-grain cereals and breads, oatmeal, beans, brown rice, citrus fruits, and apples. · Limit drinks and foods with added sugar. These include candy, desserts, and soda pop. Lifestyle changes  · If your doctor recommends it, get more exercise. Walking is a good choice. Bit by bit, increase the amount you walk every day. Try for at least 30 minutes on most days of the week. You also may want to swim, bike, or do other activities. · Do not smoke. If you need help quitting, talk to your doctor about stop-smoking programs and medicines. These can increase your chances of quitting for good. Quitting smoking may be the most important step you can take to protect your heart. It is never too late to quit. You will get health benefits right away. · Limit alcohol to 2 drinks a day for men and 1 drink a day for women. Too much alcohol can cause health problems. Medicines  · Take your medicines exactly as prescribed. Call your doctor if you think you are having a problem with your medicine. · If your doctor recommends aspirin, take the amount directed each day. Make sure you take aspirin and not another kind of pain reliever, such as acetaminophen (Tylenol). If you take ibuprofen (such as Advil or Motrin) for other problems, take aspirin at least 2 hours before taking ibuprofen. When should you call for help? Call 911 if you have symptoms of a heart attack. These may include:  · You have chest pain or pressure.  This may occur with:  ¨ Chest pain or pressure, or a strange feeling in the chest.  ¨ Sweating. ¨ Shortness of breath. ¨ Pain, pressure, or a strange feeling in the back, neck, jaw, or upper belly or in one or both shoulders or arms. ¨ Lightheadedness or sudden weakness. ¨ A fast or irregular heartbeat. After you call 911, the  may tell you to chew 1 adult-strength or 2 to 4 low-dose aspirin. Wait for an ambulance. Do not try to drive yourself. Watch closely for changes in your health, and be sure to contact your doctor if:  · Your symptoms are slowly getting worse. · You do not get better as expected. Where can you learn more? Go to http://magda-tyra.info/. Enter X199 in the search box to learn more about \"A Healthy Heart: Care Instructions. \"  Current as of: April 3, 2017  Content Version: 11.3  © 5625-7534 [employer]. Care instructions adapted under license by [employer] (which disclaims liability or warranty for this information). If you have questions about a medical condition or this instruction, always ask your healthcare professional. [place] [address] any warranty or liability for your use of this information.

## 2017-09-28 ENCOUNTER — HOSPITAL ENCOUNTER (OUTPATIENT)
Dept: PHYSICAL THERAPY | Age: 82
Discharge: HOME | End: 2017-09-28
Payer: COMMERCIAL

## 2017-09-28 PROCEDURE — 97163 PT EVAL HIGH COMPLEX 45 MIN: CPT

## 2017-09-28 PROCEDURE — 97110 THERAPEUTIC EXERCISES: CPT

## 2017-09-28 NOTE — PROGRESS NOTES
[facility] [employer] [facility] [address]  [phone] fax    Plan of Care/ Statement of Necessity for Physical Therapy Services    Patient name: Peng Valiente Start of Care: 2017   Referral source: Wiley Esquivel MD : 1935    Medical Diagnosis: Primary generalized (osteo)arthritis [M15.0]  Cervicalgia [M54.2]  Bilateral primary osteoarthritis of hip [M16.0]   Onset Date:Hip=4-5 months shoulder=1 month    Treatment Diagnosis: Hip and C/S   Prior Hospitalization: see medical history Provider#: [id number]   Medications: Verified on Patient summary List    Comorbidities:CHF, SOB, Fatigue, Early Parkinson's (per pt), Arthritis, Osteoporosis   Prior Level of Function: Used to be able to perform gardening activities w/o difficulty. Exercise 1 hr/day at home. The Plan of Care and following information is based on the information from the initial evaluation. Assessment/ key information: Pt is an 81 yr old male with complaints of right Shoulder and right hip pain. He reported it started in his hip and \"moved up to his Shoulder\". Pt reports the pain is much less since the steroid pack. ROM is all grossly WNL, strength is grossly 4+/5. Pt reports pain is mild and only with specific positions during ex's. He is mostly concerned about his fatigue and SOB. Pt had to take multiple rest breaks during evaluation. His balance is of concern and he reported multiple near falls. His posture is poor with back and core weakness. Pt has decreased LE flexibility and unstable ambulation. Pt will benefit from skilled therapy to improve pain, endurance, mobility and strength to return to PLOF.    Evaluation Complexity History HIGH Complexity :3+ comorbidities / personal factors will impact the outcome/ POC ; Examination HIGH Complexity : 4+ Standardized tests and measures addressing body structure, function, activity limitation and / or participation in recreation ;Presentation HIGH Complexity : Unstable and unpredictable characteristics  ; Clinical Decision Making MEDIUM Complexity : FOTO score of 26-74  Overall Complexity Rating: HIGH   Problem List: pain affecting function, decrease ROM, decrease strength, impaired gait/ balance, decrease ADL/ functional abilitiies, decrease activity tolerance and decrease flexibility/ joint mobility   Treatment Plan may include any combination of the following: Therapeutic exercise, Therapeutic activities, Neuromuscular re-education, Physical agent/modality, Gait/balance training and Manual therapy  Patient / Family readiness to learn indicated by: asking questions, trying to perform skills and interest  Persons(s) to be included in education: patient (P)  Barriers to Learning/Limitations: None  Patient Goal (s): To decrease pain  Patient Self Reported Health Status: fair  Rehabilitation Potential: good    Short Term Goals: To be accomplished in 1 weeks:   1. Pt will be compliant with a HEP to improve sx of pain to perform ADL's with greater ease. Long Term Goals: To be accomplished in 6 weeks:   1. Pt will increase FOTO score by 15 pts to improve function. 2. Pt will tolerate 30 mins ex with least rest breaks to improve with endurance to return to PLOF. 3. Pt will demonstrate Romberg x 30 sec EO/ECto decrease fall risk. 4. Pt will improve posture w/o vC's to ease with pain. Frequency / Duration: Patient to be seen 2 times per week for 6 weeks. Patient/ Caregiver education and instruction: Diagnosis, prognosis, exercises   [x]  Plan of care has been reviewed with PTA    G-Codes (GP)  Mobility   Current  CK= 40-59%   Goal  CK= 40-59%    The severity rating is based on clinical judgment and the FOTO score.     Certification Period: 9/29/17-12/28/17  See Brown PT 9/28/2017 1:44 PM    ________________________________________________________________________    I certify that the above Therapy Services are being furnished while the patient is under my care. I agree with the treatment plan and certify that this therapy is necessary.     Physician's Signature:____________________  Date:____________Time: _________    Please sign and return to [facility] - [facility]  [address] [place] [address]  [phone] fax

## 2017-09-28 NOTE — PROGRESS NOTES
PT DAILY TREATMENT NOTE - [facility]     Patient Name: Vijaya Lr  Date:2017  : 1935  [x]  Patient  Verified  Payor: BLUE CROSS / Plan:  [facility] [place] / Product Type: PPO /    In time:1210  Out time:100  Total Treatment Time (min): 50  Total Timed Codes (min): 50  1:1 Treatment Time ( only): 48   Visit #: 1 of     Treatment Area: Primary generalized (osteo)arthritis [M15.0]  Cervicalgia [M54.2]  Bilateral primary osteoarthritis of hip [M16.0]    SUBJECTIVE  Pain Level (0-10 scale): 3/10  Any medication changes, allergies to medications, adverse drug reactions, diagnosis change, or new procedure performed?: [x] No    [] Yes (see summary sheet for update)  Subjective functional status/changes:   [] No changes reported  See eval    OBJECTIVE    25 min []Eval                  []Re-Eval       25 min Therapeutic Exercise:  [] See flow sheet :   Rationale: increase ROM, increase strength and improve coordination to improve the patients ability to ease with ADL's          With   [] TE   [] TA   [] neuro   [] other: Patient Education: [x] Review HEP    [] Progressed/Changed HEP based on:   [] positioning   [] body mechanics   [] transfers   [] heat/ice application    [] other:      Other Objective/Functional Measures: see eval     Pain Level (0-10 scale) post treatment: 3/10    ASSESSMENT/Changes in Function: see eval    Patient will continue to benefit from skilled PT services to modify and progress therapeutic interventions, address functional mobility deficits, address ROM deficits, address strength deficits, analyze and address soft tissue restrictions, analyze and cue movement patterns, analyze and modify body mechanics/ergonomics, assess and modify postural abnormalities and address imbalance/dizziness to attain remaining goals.      []  See Plan of Care  []  See progress note/recertification  []  See Discharge Summary         Progress towards goals / Updated goals:  See poc    PLAN  [x] Upgrade activities as tolerated     [x]  Continue plan of care  []  Update interventions per flow sheet       []  Discharge due to:_  []  Other:_      Abimael Seaman, PT 9/28/2017  1:30 PM    Future Appointments  Date Time Provider Toño Margo   10/4/2017 11:30 AM Vipul Palmer PT [id number]   10/6/2017 10:00 AM Vipul Palmer PT CGC[facility]   10/11/2017 10:30 AM Elysia Toribio PTA [facility]   10/13/2017 10:30 AM Fadi Michelle [facility]   10/18/2017 10:00 AM Elysia Toribio PTA [facility]   10/20/2017 10:00 AM Fadi Michelle [facility]   10/25/2017 10:30 AM Vipul Palmer PT [id number] [facility]   10/27/2017 10:30 AM Vipul Palmer PT [id number]   8/30/2018 10:30 AM Pilo Burden MD [id number] [place] [address]

## 2017-10-04 ENCOUNTER — HOSPITAL ENCOUNTER (OUTPATIENT)
Dept: PHYSICAL THERAPY | Age: 82
Discharge: HOME | End: 2017-10-04
Payer: COMMERCIAL

## 2017-10-04 PROCEDURE — 97110 THERAPEUTIC EXERCISES: CPT

## 2017-10-04 NOTE — PROGRESS NOTES
PT DAILY TREATMENT NOTE - [facility] 3-16    Patient Name: Dolph Closs  Date:10/4/2017  : 1935  [x]  Patient  Verified  Payor: BLUE CROSS / Plan: [facility][1] [place] / Product Type: PPO /    In time: 11:30  Out time: 11:55  Total Treatment Time (min):  25  Total Timed Codes (min):  25     Visit #: 2 of     Treatment Area: Primary generalized (osteo)arthritis [M15.0]  Cervicalgia [M54.2]  Bilateral primary osteoarthritis of hip [M16.0]    SUBJECTIVE  Pain Level (0-10 scale):  7/10  Any medication changes, allergies to medications, adverse drug reactions, diagnosis change, or new procedure performed?: [x] No    [] Yes (see summary sheet for update)  Subjective functional status/changes:   [] No changes reported  Pt. Reports he is feeling about the same. He did HEP and could barely move the next day. OBJECTIVE    25 min Therapeutic Exercise:  [x] See flow sheet :   Rationale: increase ROM and increase strength to improve the patients ability to increase ease of ADLs          With   [x] TE   [] TA   [] neuro   [] other: Patient Education: [x] Review HEP    [] Progressed/Changed HEP based on:   [] positioning   [] body mechanics   [] transfers   [] heat/ice application    [] other:      Other Objective/Functional Measures:   Pt. Tolerated PT well with no reports of increased pain  BP: 116/70  He was challenged with Rhomberg balance with eyes closed  Pt. Required cues to perform hipx3 correctly     Pain Level (0-10 scale) post treatment: 0/10    ASSESSMENT/Changes in Function:  Pt.  Initiated PT and is compliant with his HEP    Patient will continue to benefit from skilled PT services to modify and progress therapeutic interventions, address functional mobility deficits, address ROM deficits, address strength deficits, analyze and address soft tissue restrictions, analyze and cue movement patterns, analyze and modify body mechanics/ergonomics and assess and modify postural abnormalities to attain remaining goals.     Progress towards goals / Updated goals:  Short Term Goals: To be accomplished in 1 weeks:                        1. Pt will be compliant with a HEP to improve sx of pain to perform ADL's with greater ease. met    Long Term Goals: To be accomplished in 6 weeks:                        1. Pt will increase FOTO score by 15 pts to improve function. 2. Pt will tolerate 30 mins ex with least rest breaks to improve with endurance to return to PLOF. 3. Pt will demonstrate Romberg x 30 sec EO/ECto decrease fall risk. 4. Pt will improve posture w/o vC's to ease with pain.      PLAN  []  Upgrade activities as tolerated     [x]  Continue plan of care  []  Update interventions per flow sheet       []  Discharge due to:_  []  Other:_      Stanislaw Zamorano, PT 10/4/2017  11:31 AM

## 2017-10-06 ENCOUNTER — HOSPITAL ENCOUNTER (OUTPATIENT)
Dept: PHYSICAL THERAPY | Age: 82
Discharge: HOME | End: 2017-10-06
Payer: COMMERCIAL

## 2017-10-06 PROCEDURE — 97110 THERAPEUTIC EXERCISES: CPT

## 2017-10-06 NOTE — PROGRESS NOTES
PT DAILY TREATMENT NOTE     Patient Name: Vladimir Vale  Date:10/6/2017  : 1935  [x]  Patient  Verified  Payor: Colin Quijano / Plan:  [facility] / Product Type: PPO /    In time:10:04  Out time: 10:33  Total Treatment Time (min): 29  Visit #: 3 of     Treatment Area: Primary generalized (osteo)arthritis [M15.0]  Cervicalgia [M54.2]  Bilateral primary osteoarthritis of hip [M16.0]    SUBJECTIVE  Pain Level (0-10 scale): ~1/10 pt didn't rate  Any medication changes, allergies to medications, adverse drug reactions, diagnosis change, or new procedure performed?: [x] No    [] Yes (see summary sheet for update)  Subjective functional status/changes:   [] No changes reported  Pt reported feeling ok     OBJECTIVE    29 min Therapeutic Exercise:  [x] See flow sheet :   Rationale: increase ROM and increase strength to improve the patients ability to increase ease of ADLs      With   [] TE   [] TA   [] neuro   [] other: Patient Education: [x] Review HEP    [] Progressed/Changed HEP based on:   [] positioning   [] body mechanics   [] transfers   [] heat/ice application    [] other:      Other Objective/Functional Measures:    Increased rep with Penny per flow sheet, pt tolerated fairly with min/mod pain    Shoulders hiking with Penny 45% of time, mod verbal cues to correct   Limited ROM with c/s retraction    Poor balance with Romberg on foam and EC; LOB after 7 sec; fair plus with EO, tolerated 3 sec with mod sway towards the end   BP: 114/64 mmHg; O2 sat: 96%    Pain Level (0-10 scale) post treatment: ~1/10    ASSESSMENT/Changes in Function: pt demonstrated good tolerance to physical activity with no SOB/fatigue. Pt demonstrated mod challenge with all Penny and balance activity, also reported min pain. Will cont with POC to maximize pt's functional mobility.     Patient will continue to benefit from skilled PT services to modify and progress therapeutic interventions, address functional mobility deficits, address ROM deficits, address strength deficits, analyze and address soft tissue restrictions, analyze and cue movement patterns, analyze and modify body mechanics/ergonomics and assess and modify postural abnormalities to attain remaining goals.      Progress towards goals / Updated goals:  Short Term Goals: To be accomplished in 1 weeks:                        1. Pt will be compliant with a HEP to improve sx of pain to perform ADL's with greater ease. met     Long Term Goals: To be accomplished in 6 weeks:                        0. Pt will increase FOTO score by 15 pts to improve function.                        2. Pt will tolerate 30 mins ex with least rest breaks to improve with endurance to return to PLOF.  Progressing, no observable SOB and reported no fatigued with PT session today 10-6-17                        3. Pt will demonstrate Romberg x 30 sec EO/ECto decrease fall risk.                        4. Pt will improve posture w/o vC's to ease with pain.      PLAN  []  Upgrade activities as tolerated     [x]  Continue plan of care  []  Update interventions per flow sheet       []  Discharge due to:_  []  Other:_    Alphonso Kidd, PT 10/6/2017  7:57 AM    Future Appointments  Date Time Provider Toño Aragon   10/6/2017 10:00 AM Mary Kate Webb Pulling [facility]   10/11/2017 10:30 AM Apple Sales PTA KD[facility]   10/13/2017 10:30 AM Rayyonatan Elizabeths [facility]   10/18/2017 10:00 AM Apple Sales PTA [facility]   10/20/2017 10:00 AM Josiah Weber [facility]   10/25/2017 10:30 AM Sheryl Geronimo, PT EPNGUVA [facility]   10/27/2017 10:30 AM Sheryl Geronimo, PT [email or web address] [facility]   8/30/2018 10:30 AM Zeke Mayorga MD [address] [place] [address]

## 2017-10-11 ENCOUNTER — APPOINTMENT (OUTPATIENT)
Dept: PHYSICAL THERAPY | Age: 82
End: 2017-10-11
Payer: COMMERCIAL

## 2017-10-13 ENCOUNTER — APPOINTMENT (OUTPATIENT)
Dept: PHYSICAL THERAPY | Age: 82
End: 2017-10-13
Payer: COMMERCIAL

## 2017-10-18 ENCOUNTER — APPOINTMENT (OUTPATIENT)
Dept: PHYSICAL THERAPY | Age: 82
End: 2017-10-18
Payer: COMMERCIAL

## 2017-10-20 ENCOUNTER — APPOINTMENT (OUTPATIENT)
Dept: PHYSICAL THERAPY | Age: 82
End: 2017-10-20
Payer: COMMERCIAL

## 2017-10-25 ENCOUNTER — APPOINTMENT (OUTPATIENT)
Dept: PHYSICAL THERAPY | Age: 82
End: 2017-10-25
Payer: COMMERCIAL

## 2017-10-27 ENCOUNTER — APPOINTMENT (OUTPATIENT)
Dept: PHYSICAL THERAPY | Age: 82
End: 2017-10-27
Payer: COMMERCIAL

## 2017-11-15 NOTE — ANCILLARY DISCHARGE INSTRUCTIONS
[facility] - Annita Pretty  [address] [facility]  [phone] fax        Physical Therapy Discharge        Patient name: Elizabeth Aguilar Start of Care: 2017   Referral source: Bonita Sales MD : 1935                         Medical Diagnosis: Primary generalized (osteo)arthritis [M15.0]  Cervicalgia [M54.2]  Bilateral primary osteoarthritis of hip [M16.0] Onset Date:Hip=4-5 months shoulder=1 month                         Treatment Diagnosis: Hip and C/S   Prior Hospitalization: see medical history Provider#: [id number]   Medications: Verified on Patient summary List    Comorbidities:CHF, SOB, Fatigue, Early Parkinson's (per pt), Arthritis, Osteoporosis   Prior Level of Function: Used to be able to perform gardening activities w/o difficulty. Exercise 1 hr/day at home. Visits from Start of Care: 3    Missed Visits: 0    Summary of Care:    Short Term Goals: To be accomplished in 1 weeks:                        3. Pt will be compliant with a HEP to improve sx of pain to perform ADL's with greater ease. met      Long Term Goals: To be accomplished in 6 weeks:                        4. Pt will increase FOTO score by 15 pts to improve function.                        2. Pt will tolerate 30 mins ex with least rest breaks to improve with endurance to return to PLOF. Progressing, no observable SOB and reported no fatigued with PT session today 10-6-17                        3. Pt will demonstrate Romberg x 30 sec EO/ECto decrease fall risk.                        4. Pt will improve posture w/o vC's to ease with pain. Pt self D/c'd. Pt reported he was in more pain after therapy sessions.   Pt D/c'd with goals progressing.        ASSESSMENT/RECOMMENDATIONS:  [x]Discontinue therapy: []Patient has reached or is progressing toward set goals      []Patient is non-compliant or has abdicated      []Due to lack of appreciable progress towards set goals    Eloina De La Fuente, FABIAN 11/15/2017 3:02 PM

## 2018-09-06 ENCOUNTER — HOSPITAL ENCOUNTER (OUTPATIENT)
Dept: GENERAL RADIOLOGY | Age: 83
Discharge: HOME | End: 2018-09-06
Payer: COMMERCIAL

## 2018-09-06 DIAGNOSIS — R06.00 DYSPNEA: ICD-10-CM

## 2018-09-06 PROCEDURE — 71046 X-RAY EXAM CHEST 2 VIEWS: CPT

## 2018-09-07 ENCOUNTER — HOSPITAL ENCOUNTER (OUTPATIENT)
Dept: PHYSICAL THERAPY | Age: 83
Discharge: HOME | End: 2018-09-07
Payer: COMMERCIAL

## 2018-09-07 PROCEDURE — G8985 CARRY GOAL STATUS: HCPCS

## 2018-09-07 PROCEDURE — 97161 PT EVAL LOW COMPLEX 20 MIN: CPT

## 2018-09-07 PROCEDURE — 97530 THERAPEUTIC ACTIVITIES: CPT

## 2018-09-07 PROCEDURE — G8984 CARRY CURRENT STATUS: HCPCS

## 2018-09-07 NOTE — PROGRESS NOTES
PT DAILY TREATMENT NOTE/LUMBAR EVAL 3-16 Patient Name: Merry Reich Date:2018 : 1935 [x]  Patient  Verified Payor: BLUE CROSS / Plan: [facility] / Product Type: PPO / In time:9:05  Out time:9:45 Total Treatment Time (min): 40 Total Timed Codes (min): 23 
1:1 Treatment Time ( only): 40 Visit #: 1 of  Treatment Area: Pain in thoracic spine [M54.6] SUBJECTIVE Pain Level (0-10 scale): 5/10 []constant []intermittent []improving []worsening []no change since onset Any medication changes, allergies to medications, adverse drug reactions, diagnosis change, or new procedure performed?: [x] No    [] Yes (see summary sheet for update) Subjective functional status/changes:    
PLOF: Right handed, living with wife, 3 DAVID with B rails. abm with SPC 1-3 months ago, less pain and mod Ind with all ADLs/mobility Limitations to PLOF:  
Mechanism of Injury:  
Current symptoms/Complaints: Back pain from t/s to l/s Pt denied any radicular symptoms. Pain located at back. Pt and wife reported degenerative problem and decreased disc space with imaging. Previous Treatment/Compliance: PMHx/Surgical Hx:  
Work Hx:  
Living Situation:  
Pt Goals:  no more discomfort Barriers: []pain []financial []time []transportation []other Motivation:  
Substance use: []Alcohol []Tobacco []other:  
FABQ Score: []low []elevate Cognition: A & O x     Other: OBJECTIVE/EXAMINATION Domestic Life:  
Activity/Recreational Limitations:  
Mobility:  
Self Care:  
 
17 min [x]Eval                  []Re-Eval  
 
 
23 min Therapeutic Activity:  []  See flow sheet :Pt edu within scope of practice on prognosis, POC, l/s, t/s anatomy, posture, review HEP Rationale: increase ROM, increase strength, improve coordination, improve balance and increase proprioception  to improve the patients ability to perform ADLs with ease With 
 [] TE 
 [] TA 
 [] neuro [] other: Patient Education: [x] Review HEP [] Progressed/Changed HEP based on:  
[] positioning   [] body mechanics   [] transfers   [] heat/ice application   
[] other:   
 
Other Objective/Functional Measures:  
 
Physical Therapy Evaluation - Lumbar Spine ([facility]) SUBJECTIVE Chief Complaint: 
 
Mechanism of injury: 
 
Symptoms: 
Aggravated by: 
 [] Bending [] Sitting [] Standing [] Walking 
 [] Moving [] Cough [] Sneeze [] Valsalva 
 [] AM  [] PM  Lying:  [] sup   [] pro   [] sidelying 
 [] Other: 
  
Eased by:   
 [x] Bending backwards [] Sitting [] Standing [] Walking 
 [] Moving [] AM  [] PM  Lying: [] sup  [] pro  [] sidelying 
 [] Other: 
  
General Health:  Red Flags Indicated? [] Yes    [] No 
[] Yes [x] No Recent weight change (If yes, due to dieting? [] Yes  [] No) [] Yes [x] No Weakness in legs during walking 
[] Yes [x] No Unremitting pain at night 
[] Yes [] No Abdominal pain or problems 
[] Yes [] No Rectal bleeding 
[] Yes [x] No Feet more cold or painful in cold weather 
[] Yes [] No Menstrual irregularities 
[] Yes [] No Blood or pain with urination  
[x] Yes [] No Dysfunction of bowel or bladder 
[] Yes [] No Recent illness within past 3 weeks (i.e, cold, flu) 
[] Yes [x] No Numbness/tingling in buttock/genitalia region Past History/Treatments:  
 
Diagnostic Tests: [] Lab work [] X-rays    [] CT [] MRI     [] Other: 
Results: 
 
Functional Status Prior level of function: 
Present functional limitations: 
What position do you sleep in?: 
 
OBJECTIVE Posture: very poor with slouching, forwarded head Lateral Shift: [] R    [] L     [] +  [] - 
Kyphosis: [] Increased [] Decreased   []  WNL Lordosis:  [] Increased [] Decreased   [] WNL Pelvic symmetry: [] WNL    [] Other: 
 
Gait:  [] Normal     [] Abnormal: 
 
Active Movements: [] N/A   [] Too acute   [] Other: ROM % AROM % PROM Comments:pain, area Forward flexion 40-60 [facility] Extension 20-30 WFL    
SB right 20-30 [facility] SB left 20-30 [facility] Rotation right 5-10 [facility] Rotation left 5-10 [facility] Min decreased, about 30%, with t/s rot AROM Repeated Movements Effects on present pain: produces (OH), abolishes (A), increases (incr), decreases (decr), centralizes (C), peripheral (PH), no effect (NE) Pre-Test Sx Flexion Repeated Flexion Extension Repeated Extension Repeated SBL Repeated SBR Sitting Standing Lying      N/A N/A Comments: 
Side Glide: 
Sustained passive positioning test: 
 
Neuro Screen [x] WNL Myotome/Dermatome Reflexes: 
Comments: 
 
Dural Mobility: SLR Sitting: [] R    [] L    [] +    [] -  @ (degrees):  
        Supine: [] R    [] L    [] +    [] -  @ (degrees):  
Slump Test: [] R    [] L    [] +    [] -  @ (degrees): Prone Knee Bend: [] R    [] L    [] +    [] -  
 
Palpation [] Min  [] Mod  [] Severe    Location: 
[] Min  [] Mod  [] Severe    Location: 
[] Min  [] Mod  [] Severe    Location: 
 
Strength 
 L(0-5) R (0-5) N/T Hip Flexion (L1,2) 4 4 [] Knee Extension (L3,4) 5 5 [] Ankle Dorsiflexion (L4) 5 5 [] Great Toe Extension (L5) 5 5 [] Ankle Plantarflexion (S1) ~4 ~4 [] Knee Flexion (S1,2) 4+ 4+ [] Upper Abdominals   [] Lower Abdominals   [] Paraspinals   [] Back Rotators   [] Gluteus Ervin 3 3 [] Other   [] Special Tests Lumbar:  Lumb. Compression: [] Pos  [] Neg            
  Lumbar Distraction:   [] Pos  [] Neg 
  Quadrant:  [] Pos  [] Neg   [] Flex  [] Ext Sacroilliac:  Gaenslen's: [] R    [] L    [] +    [] -  
  Compression: [] +    [] -  
  Gapping:  [] +    [] - Thigh Thrust: [] R    [] L    [] +    [] - Leg Length: [] +    [] -   Position: 
  Crests: 
  ASIS: 
  PSIS: 
  Sacral Sulcus: 
  Mobility: Standing flex: 
   Sitting flex: 
   Supine to sit: 
   Prone knee bend: 
 
     Hip: Jose Repress:  [] R    [] L    [] +    [] - Scour:  [] R    [] L    [] +    [] - Piriformis: [x] R    [x] L    [x] +    [] -  Worst with Left Deficits: Fahad's: [] R    [] L    [] +    [] - Carliss Cancer: [] R    [] L    [] +    [] - Hamstrings 90/90: mod tightness Gastrocsoleus (to neutral): Right: Left: 
    
Global Muscular Weakness: 
Abdominals: 
Quadratus Lumborum: 
Paraspinals: Other: 
 
Other tests/comments: 
 Max tightness with hips flexor and quad B Pain Level (0-10 scale) post treatment: 0/10 ASSESSMENT/Changes in Function: see POC Patient will continue to benefit from skilled PT services to modify and progress therapeutic interventions, address functional mobility deficits, address ROM deficits, address strength deficits, analyze and address soft tissue restrictions, analyze and cue movement patterns, analyze and modify body mechanics/ergonomics, assess and modify postural abnormalities, address imbalance/dizziness and instruct in home and community integration to attain remaining goals. [x]  See Plan of Care 
[]  See progress note/recertification 
[]  See Discharge Summary Progress towards goals / Updated goals: 
See POC PLAN [x]  Upgrade activities as tolerated     [x]  Continue plan of care 
[]  Update interventions per flow sheet      
[]  Discharge due to:_ 
[]  Other:_ Matthew Bragg, PT 9/7/2018  8:52 AM

## 2018-09-07 NOTE — PROGRESS NOTES
[facility] [address], [address] [place], [facility] [address] [address] 
[phone] fax Plan of Care/ Statement of Necessity for Physical Therapy Services Patient name: Jalyn Craft Start of Care: 2018 Referral source: Lima TrentonMegan durbin : 1935 Medical Diagnosis: Pain in thoracic spine [M54.6] Onset Date: 1-2 years ago Treatment Diagnosis: Back pain (t/s and l/s) Prior Hospitalization: see medical history Provider#: [id number] Medications: Verified on Patient summary List  
 Comorbidities: osteoporosis, arthritis, thyroid Prior Level of Function: Right handed, living with wife, 3 ADARSH with B rails. abm with [facility]PC 1-3 months ago, less pain and mod Ind with all ADLs/mobility The Plan of Care and following information is based on the information from the initial evaluation. Assessment/ brown information: Mr. Joanne Martinez is a 80 y/o M pt with CC of back pain (t/s and l/s). Pt denied any radicular symptoms. Pt and wife reported degenerative problem and decreased disc space shown by X-ray/MRI. Pt presented with min decreased t/s AROM, WFL AROM of trunk overall, mod stiffness of spine at all level, poor posture with forwarded neck and increased kyphosis. Pt also demonstrates mod tightness of B hips, poor deep hips strength, and max difficulty with c/s retraction. PT prefers slouching position due to pain. WNL with myotomes and dermatomes screening. Pt also reports 1 fall during the last 3 months leading to using [facility] for amb presently, will perform thorough dynamic gait balance on the second visit. Pt would benefit from skilled PT to address these deficits above for pain free functional mobility.  
 
Evaluation Complexity History HIGH Complexity :3+ comorbidities / personal factors will impact the outcome/ POC ; Examination HIGH Complexity : 4+ Standardized tests and measures addressing body structure, function, activity limitation and / or participation in recreation  ;Presentation LOW Complexity : Stable, uncomplicated  ;Clinical Decision Making MEDIUM Complexity : FOTO score of 26-74 Overall Complexity Rating: LOW Problem List: pain affecting function, decrease ROM, decrease strength, edema affecting function, impaired gait/ balance, decrease ADL/ functional abilitiies, decrease activity tolerance, decrease flexibility/ joint mobility and decrease transfer abilities Treatment Plan may include any combination of the following: Therapeutic exercise, Therapeutic activities, Neuromuscular re-education, Physical agent/modality, Gait/balance training, Manual therapy, Patient education, Self Care training, Functional mobility training, Home safety training and Stair training Patient / Family readiness to learn indicated by: asking questions, trying to perform skills and interest 
Persons(s) to be included in education: patient (P) Barriers to Learning/Limitations: None Patient Goal (s): no more discomfort Patient Self Reported Health Status: fair Rehabilitation Potential: good Short term goals: To be accomplished within 1 week 1. Pt will be independent with HEP to maintain progression. Eval status: given and reviewed HEP Long term goals: To be accomplished within 8 weeks 1. Pt will improve FOTO score by 11 points to 55/100 to show improvement with functional mobility performance. Eval status: 44 
  
2. Pt will report no more than 1-2/10 to improve QOL. Eval status: 5/10 constantly 3. Pt will be able to amb/sitting for more than 10 min with no increased of pain so he can navigate household and community with ease. Eval status: pt stated about 1 min and he has to slouch/lean fowards Frequency / Duration: Patient to be seen 2-3 times per week for 4-6 weeks.  
 
Patient/ Caregiver education and instruction: Diagnosis, prognosis, self care, activity modification, brace/ splint application and exercises 
 [x]  Plan of care has been reviewed with PTA 
 
G-Codes (GP) Carry  Current  CK= 40-59%  Goal  CK= 40-59% The severity rating is based on clinical judgment and the FOTO score. Katie Sparks, PT 9/7/2018 8:57 AM 
________________________________________________________________________ I certify that the above Therapy Services are being furnished while the patient is under my care. I agree with the treatment plan and certify that this therapy is necessary. [de-identified] Signature:____________Date:_________TIME:________ 
 
[employer], Date and Time must be completed for valid certification ** Please sign and return to In . [address], [address], [address] [place] [address],[facility] [address] 
[phone] fax

## 2018-09-10 ENCOUNTER — HOSPITAL ENCOUNTER (OUTPATIENT)
Dept: PHYSICAL THERAPY | Age: 83
Discharge: HOME | End: 2018-09-10
Payer: COMMERCIAL

## 2018-09-10 NOTE — PROGRESS NOTES
PT DAILY TREATMENT NOTE - [employer]  Patient Name: Margarito Thao Date:9/10/2018 : 1935 [x]  Patient  Verified Payor: BLUE CROSS / Plan: [facility] / Product Type: PPO / In time:12:27  Out time:1:11 Total Treatment Time (min): 44 Total Timed Codes (min): 44 
1:1 Treatment Time ( [address] only): 12 Visit #: 2 of  Treatment Area: Pain in thoracic spine [M54.6] SUBJECTIVE Pain Level (0-10 scale): 2 Any medication changes, allergies to medications, adverse drug reactions, diagnosis change, or new procedure performed?: [x] No    [] Yes (see summary sheet for update) Subjective functional status/changes:   [] No changes reported Pt states that he is doing good today. OBJECTIVE 34 min Therapeutic Exercise:  [x] See flow sheet :  
Rationale: increase ROM and increase strength to improve the patients ability to tolerate ADLs 10 min Neuromuscular Re-education:  []  See flow sheet : dynamic gait and balance assessent Rationale: increase ROM, increase strength, improve coordination, improve balance and increase proprioception  to improve the patients ability to ambulate more safely. With 
 [] TE 
 [] TA 
 [] neuro 
 [] other: Patient Education: [x] Review HEP [] Progressed/Changed HEP based on:  
[] positioning   [] body mechanics   [] transfers   [] heat/ice application   
[] other:   
 
Other Objective/Functional Measures: Initiated exercises/interventions in flow sheet. Several LOB noted with ambulation with horizontal head turns and states that his \"equilbirum is off\" when performing this exercise. Pain Level (0-10 scale) post treatment: 3 
 
ASSESSMENT/Changes in Function: During dynamic gait activities, the pt needed to lean forward with his hands on his knees at times secondary to having LBP with his back in an erect position.  Pt had significant balance disturbances with horizontal head turns with ambulation during dynamic gait and balance activities. We will address this in upcoming sessions to help improve strength and stability with walking to improve the pt's overall safety. Continue POC as tolerated. Patient will continue to benefit from skilled PT services to modify and progress therapeutic interventions, address functional mobility deficits, address ROM deficits, address strength deficits, analyze and address soft tissue restrictions, analyze and cue movement patterns, analyze and modify body mechanics/ergonomics, assess and modify postural abnormalities, address imbalance/dizziness and instruct in home and community integration to attain remaining goals. []  See Plan of Care 
[]  See progress note/recertification 
[]  See Discharge Summary Progress towards goals / Updated goals: 
Short term goals: To be accomplished within 1 week 1. Pt will be independent with HEP to maintain progression. Eval status: given and reviewed HEP 
  
Long term goals: To be accomplished within 8 weeks 1. Pt will improve FOTO score by 11 points to 55/100 to show improvement with functional mobility performance. Eval status: 44 
   
2. Pt will report no more than 1-2/10 to improve QOL. Eval status: 5/10 constantly 
   
3. Pt will be able to amb/sitting for more than 10 min with no increased of pain so he can navigate household and community with ease. Eval status: pt stated about 1 min and he has to slouch/lean fowards PLAN [x]  Upgrade activities as tolerated     [x]  Continue plan of care [x]  Update interventions per flow sheet      
[]  Discharge due to:_ 
[]  Other:_ Ashwini Myers, PT 9/10/2018  1:01 PM 
 
Future Appointments Date Time Provider Toño Aragon 9/10/2018 12:30 PM Ashwini Myers PT [facility]  
9/12/2018 11:00 AM Alejandra Garvin, PTA [facility]  
9/18/2018 10:30 AM [facility] PT CHESANelsonE SQ 1 [facility]  
9/21/2018 9:00 AM Ashwini Myers PT [facility]  
 9/24/2018 11:00 AM Alejandra Garvin, PTA [facility]  
9/27/2018 11:00 AM Toi Adames PT [facility]

## 2018-09-12 ENCOUNTER — HOSPITAL ENCOUNTER (OUTPATIENT)
Dept: PHYSICAL THERAPY | Age: 83
Discharge: HOME | End: 2018-09-12
Payer: COMMERCIAL

## 2018-09-12 PROCEDURE — 97140 MANUAL THERAPY 1/> REGIONS: CPT

## 2018-09-12 PROCEDURE — 97110 THERAPEUTIC EXERCISES: CPT

## 2018-09-12 NOTE — PROGRESS NOTES
PT DAILY TREATMENT NOTE - [facility]  Patient Name: Ritika Sanderson Date:2018 : 1935 [x]  Patient  Verified Payor: BLUE CROSS / Plan: [facility] [place] [facility] / Product Type: PPO / In time:11:04  Out time  12:00 Total Treatment Time (min): 57 Total Timed Codes (min):  47 
1:1 Treatment Time ( only): 31 Visit #: 3 of  Treatment Area: Pain in thoracic spine [M54.6] SUBJECTIVE Pain Level (0-10 scale): 2 Any medication changes, allergies to medications, adverse drug reactions, diagnosis change, or new procedure performed?: [x] No    [] Yes (see summary sheet for update) Subjective functional status/changes:   [] No changes reported Still  Some  Pain. OBJECTIVE Modality rationale: decrease edema, decrease inflammation, decrease pain and increase tissue extensibility to improve the patients ability to perform ADL Min Type Additional Details  
 [] Estim:  []Unatt       []IFC  []Premod []Other:  []w/ice   []w/heat Position: Location:  
 [] Estim: []Att    []TENS instruct  []NMES []Other:  []w/US   []w/ice   []w/heat Position: Location:  
 []  Traction: [] Cervical       []Lumbar 
                     [] Prone          []Supine []Intermittent   []Continuous Lbs: 
[] before manual 
[] after manual  
 []  Ultrasound: []Continuous   [] Pulsed []1MHz   []3MHz W/cm2: 
Location:  
 []  Iontophoresis with dexamethasone Location: [] Take home patch  
[] In clinic  
10 []  Ice     [x]  heat 
[]  Ice massage 
[]  Laser  
[]  Anodyne Position: Location:  
 []  Laser with stim 
[]  Other:  Position: Location:  
 []  Vasopneumatic Device Pressure:       [] lo [] med [] hi  
Temperature: [] lo [] med [] hi  
[x] Skin assessment post-treatment:  [x]intact []redness- no adverse reaction 
  []redness  adverse reaction:  
 
 min []Eval                  []Re-Eval  
 
 
39 
1:1 
 23 min Therapeutic Exercise:  [x] See flow sheet :  
Rationale: increase ROM and increase strength to improve the patients ability to perform A 
 
 min Therapeutic Activity:  []  See flow sheet :  
Rationale:   to improve the patients ability to  
  
 min Neuromuscular Re-education:  []  See flow sheet :  
Rationale:   to improve the patients ability to  
 
8 min Manual Therapy:  Gentle  P/A  mob Rationale: decrease pain, increase ROM, increase tissue extensibility and decrease edema  to perform ADL 
 
 min Gait Training:  ___ feet with ___ device on level surfaces with ___ level of assist  
Rationale: With 
 [x] TE 
 [] TA 
 [] neuro 
 [] other: Patient Education: [x] Review HEP [] Progressed/Changed HEP based on:   
[] positioning   [] body mechanics   [] transfers   [] heat/ice application   
[] other:   
 
Other Objective/Functional Measures:  Tightness (B) TSP/LSP Pain Level (0-10 scale) post treatment: 3 
 
ASSESSMENT/Changes in Function: cues  On performing  There ex  correctly Patient will continue to benefit from skilled PT services to address functional mobility deficits, address ROM deficits, address strength deficits, analyze and address soft tissue restrictions, analyze and cue movement patterns and instruct in home and community integration to attain remaining goals. [x]  See Plan of Care 
[]  See progress note/recertification 
[]  See Discharge Summary Progress towards goals / Updated goals: 
Short term goals: To be accomplished within 1 week 1. Pt will be independent with HEP to maintain progression. Eval status: given and reviewed HEP 
    
Long term goals: To be accomplished within [facility] 1. Pt will improve FOTO score by 11 points to 55/100 to show improvement with functional mobility performance. Eval status: 44 
   
2. Pt will report no more than 1-2/10 to improve QOL.  
Eval status: 5/10 constantly 
   
 3. Pt will be able to amb/sitting for more than 10 min with no increased of pain so he can navigate household communityfowards 
 with ease. Eval status: pt stated about 1 min and he has to slouch/lean PLAN 
[]  Upgrade activities as tolerated     [x]  Continue plan of care 
[]  Update interventions per flow sheet      
[]  Discharge due to:_ 
[]  Other:_   
 
Alejandra Garvin PTA 9/12/2018  11:08 AM 
 
Future Appointments Date Time Provider Toño Aragon 9/18/2018 10:30 AM [facility] [place] [facility]  
9/21/2018 9:00 AM Kika Martinez, PT [facility]  
9/24/2018 11:00 AM Alejandra Garvin PTA [facility]  
9/27/2018 11:00 AM Edwin Barraza PT [facility]

## 2018-09-18 ENCOUNTER — HOSPITAL ENCOUNTER (OUTPATIENT)
Dept: PHYSICAL THERAPY | Age: 83
Discharge: HOME | End: 2018-09-18
Payer: COMMERCIAL

## 2018-09-18 PROCEDURE — 97110 THERAPEUTIC EXERCISES: CPT

## 2018-09-18 PROCEDURE — 97140 MANUAL THERAPY 1/> REGIONS: CPT

## 2018-09-18 NOTE — PROGRESS NOTES
PT DAILY TREATMENT NOTE - [facility]  Patient Name: Kylah Andino Date:2018 : 1935 [x]  Patient  Verified Payor: BLUE CROSS / Plan: [facility] [place] [facility] / Product Type: PPO / In time   10:30  Out time:11:17 Total Treatment Time (min): 46 Total Timed Codes (min): 46 
1:1 Treatment Time [facility] only):46 Visit #: 4 of  Treatment Area: Pain in thoracic spine [M54.6] SUBJECTIVE Pain Level (0-10 scale): 2 Any medication changes, allergies to medications, adverse drug reactions, diagnosis change, or new procedure performed?: [x] No    [] Yes (see summary sheet for update) Subjective functional status/changes:   [] No changes reported Still  Some pain. OBJECTIVE Modality rationale: decrease edema, decrease inflammation, decrease pain and increase tissue extensibility to improve the patients ability to perform ADL Min Type Additional Details  
 [] Estim:  []Unatt       []IFC  []Premod []Other:  []w/ice   []w/heat Position: Location:  
 [] Estim: []Att    []TENS instruct  []NMES []Other:  []w/US   []w/ice   []w/heat Position: Location:  
 []  Traction: [] Cervical       []Lumbar 
                     [] Prone          []Supine []Intermittent   []Continuous Lbs: 
[] before manual 
[] after manual  
 []  Ultrasound: []Continuous   [] Pulsed []1MHz   []3MHz W/cm2: 
Location:  
 []  Iontophoresis with dexamethasone Location: [] Take home patch  
[] In clinic  
 []  Ice     []  heat 
[]  Ice massage 
[]  Laser  
[]  Anodyne Position: Location:  
 []  Laser with stim 
[]  Other:  Position: Location:  
 []  Vasopneumatic Device Pressure:       [] lo [] med [] hi  
Temperature: [] lo [] med [] hi  
[x] Skin assessment post-treatment:  [x]intact []redness- no adverse reaction 
  []redness  adverse reaction:  
 
 min []Eval                  []Re-Eval  
 
 
 38 min Therapeutic Exercise:  [x] See flow sheet :  
Rationale: increase ROM and increase strength to improve the patients ability to perform ADL 
 
 min Therapeutic Activity:  []  See flow sheet :  
Rationale  to improve the patients ability to 
  
 min Neuromuscular Re-education:  []  See flow sheet :  
Rationale:   to improve the patients ability to  
 
8 min Manual Therapy:  Gentle  P//A  mob Rationale: decrease pain, increase ROM, increase tissue extensibility and decrease edema  to perform ADL  
 
 min Gait Training:  ___ feet with ___ device on level surfaces with ___ level of assist  
Rationale: With 
 [x] TE 
 [] TA 
 [] neuro 
 [] other: Patient Education: [x] Review HEP [] Progressed/Changed HEP based on:  
[] positioning   [] body mechanics   [] transfers   [] heat/ice application   
[] other:   
 
Other Objective/Functional Measures:  C/o  Pain with upright posture Pain Level (0-10 scale) post treatment: 1-2 ASSESSMENT/Changes in Function: Fair  Response  To each there ex  With cues. Patient will continue to benefit from skilled PT services to address functional mobility deficits, address ROM deficits, address strength deficits, analyze and address soft tissue restrictions, analyze and cue movement patterns and instruct in home and community integration to attain remaining goals. [x]  See Plan of Care 
[]  See progress note/recertification 
[]  See Discharge Summary Progress towards goals / Updated goals: 
Short term goals: To be accomplished within 1 week 1. Pt will be independent with HEP to maintain progression. Eval status: given and reviewed HEP 
   
Long term goals: To be accomplished within [facility] 1. Pt will improve FOTO score by 11 points to 55/100 to show improvement with functional mobility performance. Eval status: 44 
   
2. Pt will report no more than 1-2/10 to improve QOL.  
Eval status: 5/10 constantly     Current:  2  9/18/18 
   
 3. Pt will be able to amb/sitting for more than 10 min with no increased of pain so he can navigate household communityfowards 
 with ease. Eval status: pt stated about 1 min and he has to slouch/lean PLAN 
[]  Upgrade activities as tolerated     [x]  Continue plan of care 
[]  Update interventions per flow sheet      
[]  Discharge due to:_ 
[]  Other:_   
 
Alejandra Garvin PTA 9/18/2018  10:34 AM 
 
Future Appointments Date Time Provider Toño Aragon 9/21/2018 9:00 AM Danielito Barajas, PT [facility]  
9/24/2018 11:00 AM Ruby Isidro PTA [facility]  
9/27/2018 11:00 AM Jalen Andino, PT [facility]

## 2018-09-21 ENCOUNTER — HOSPITAL ENCOUNTER (OUTPATIENT)
Dept: PHYSICAL THERAPY | Age: 83
Discharge: HOME | End: 2018-09-21
Payer: COMMERCIAL

## 2018-09-21 PROCEDURE — 97140 MANUAL THERAPY 1/> REGIONS: CPT

## 2018-09-21 PROCEDURE — 97110 THERAPEUTIC EXERCISES: CPT

## 2018-09-21 NOTE — PROGRESS NOTES
PT DAILY TREATMENT NOTE - [facility]  Patient Name: Natalya Mendez Date:2018 : 1935 [x]  Patient  Verified Payor: BLUE CROSS / Plan: [facility] [place]UE [place] [facility] / Product Type: PPO / In time: 9:04    Out time: 10:00 Total Treatment Time (min): 56 Total Timed Codes (min): 56 
1:1 Treatment Time ([address] only): 30 Visit #: 5 of  Treatment Area: Pain in thoracic spine [M54.6] SUBJECTIVE Pain Level (0-10 scale): 2 Any medication changes, allergies to medications, adverse drug reactions, diagnosis change, or new procedure performed?: [x] No    [] Yes (see summary sheet for update) Subjective functional status/changes:   [] No changes reported Pt reports that he feels achyness in the entire back and neck region. OBJECTIVE 48 min Therapeutic Exercise:  [x] See flow sheet :  
Rationale: increase ROM and increase strength to improve the patients ability to perform ADLs 8 min Manual Therapy: Assessment of 90/90 B HS, B hip AP mobs, B hip lateral glides, B LE long axis distraction, B hip IR PROM with gentle overpressure, manual B hip IR isometrics. Rationale: decrease pain, increase ROM, increase tissue extensibility and increase postural awareness to improve activity tolerance. With 
 [x] TE 
 [] TA 
 [] neuro 
 [] other: Patient Education: [x] Review HEP [] Progressed/Changed HEP based on:  
[] positioning   [] body mechanics   [] transfers   [] heat/ice application   
[] other:   
 
Other Objective/Functional Measures: Significant B HS tightness noted when therapist attempted 90/90 HS test on B LEs. Limited B hip IR PROM noted per visual observation during manual interventions. FOTO: 30 points. Pain Level (0-10 scale) post treatment: 2 
 
ASSESSMENT/Changes in Function: Reported no change in pain post session. Continues to sit and stand with a forward head and rounded shoulder posture and needs cueing to sit/stand with erect posture.  Pt has limited B hip mobility and flexibility which may be correlated to the pt's impaired posture. We will plan on continuing therapy at this time to improve postural awareness and mobility to help the pt's pain and symptoms with functional tasks. Continue POC as tolerated. Patient will continue to benefit from skilled PT services to modify and progress therapeutic interventions, address functional mobility deficits, address ROM deficits, address strength deficits, analyze and address soft tissue restrictions, analyze and cue movement patterns, analyze and modify body mechanics/ergonomics, assess and modify postural abnormalities and instruct in home and community integration to attain remaining goals. []  See Plan of Care 
[]  See progress note/recertification 
[]  See Discharge Summary Progress towards goals / Updated goals: 
Short term goals: To be accomplished within 1 week 1. Pt will be independent with HEP to maintain progression. Eval status: given and reviewed HEP 
   
Long term goals: To be accomplished within [facility] 1. Pt will improve FOTO score by 11 points to 55/100 to show improvement with functional mobility performance. Eval status: 44 
   
2. Pt will report no more than 1-2/10 to improve QOL. Eval status: 5/10 constantly     Current:  2  9/18/18 
   
3. Pt will be able to amb/sitting for more than 10 min with no increased of pain so he can navigate household communityfowards 
 with ease. Eval status: pt stated about 1 min and he has to slouch/lean PLAN [x]  Upgrade activities as tolerated     [x]  Continue plan of care [x]  Update interventions per flow sheet      
[]  Discharge due to:_ 
[]  Other:_ Oneal Hernandez, PT 9/21/2018  9:42 AM 
 
Future Appointments Date Time Provider Toño Aragon 9/21/2018 9:00 AM [facility]  
9/24/2018 11:00 AM Oumar Bettencourt PTA [facility]  
9/27/2018 11:00 AM [facility] Gayle[facility]  
 9/28/2018 10:30 AM [facility] CT RM 1 [facility]

## 2018-09-24 ENCOUNTER — APPOINTMENT (OUTPATIENT)
Dept: PHYSICAL THERAPY | Age: 83
End: 2018-09-24
Payer: COMMERCIAL

## 2018-09-24 NOTE — PROGRESS NOTES
In [address], [address] [place], [facility] [address] 
[phone] fax Discharge Summary Patient name: Dulce Carreon Start of Care: 2018 Referral source: Megan Aggarwal : 1935 Medical Diagnosis: Pain in thoracic spine [M54.6] Onset Date: 1-2 years ago Treatment Diagnosis: Back pain (t/s and l/s) Prior Hospitalization: see medical history Provider#: [id number] Medications: Verified on Patient summary List  
 Comorbidities: osteoporosis, arthritis, thyroid Prior Level of Function: Right handed, living with wife, 3 ADARSH with B rails. abm with [facility] 1-3 months ago, less pain and mod Ind with all ADLs/mobility Visits from Start of Care: 5    Missed Visits: 0 Reporting Period : 2018 to 2018 Summary of Care: 
Goal: Pt will be independent with HEP to maintain progression. Status at last note/certification: unable to reassess Status at discharge: not met Goal: Pt will improve FOTO score by 11 points to 55/100 to show improvement with functional mobility performance. Status at last note/certification: unable to reassess Status at discharge: not met Goal: Pt will report no more than 1-2/10 to improve QOL. Status at last note/certification: unable to reassess Status at discharge: not met Goal: Pt will be able to amb/sitting for more than 10 min with no increased of pain so he can navigate household communityfowards Status at last note/certification: unable to reassess Status at discharge: not met ASSESSMENT/RECOMMENDATIONS:  
Pt was seen for 5 therapy sessions. Per telephone log, on 2018, the pt called the clinic and requested to be d/c'ed with no reason given. Pt is therefore d/c'ed from therapy and unable to reassess goals at this time. []Discontinue therapy progressing towards or have reached established goals []Discontinue therapy due to lack of appreciable progress towards goals []Discontinue therapy due to lack of attendance or compliance [x]Other: pt requested d/c from therapy Thank you for this referral.  
 
Martha Brewster, PT 9/24/2018 7:22 PM

## 2018-09-27 ENCOUNTER — APPOINTMENT (OUTPATIENT)
Dept: PHYSICAL THERAPY | Age: 83
End: 2018-09-27
Payer: COMMERCIAL

## 2018-09-28 ENCOUNTER — HOSPITAL ENCOUNTER (OUTPATIENT)
Dept: CT IMAGING | Age: 83
Discharge: HOME | End: 2018-09-28
Attending: INTERNAL MEDICINE
Payer: COMMERCIAL

## 2018-09-28 DIAGNOSIS — R91.1 SOLITARY PULMONARY NODULE: ICD-10-CM

## 2018-09-28 DIAGNOSIS — M05.79 RHEUMATOID ARTHRITIS INVOLVING MULTIPLE SITES WITH POSITIVE RHEUMATOID FACTOR (HCC): ICD-10-CM

## 2018-09-28 DIAGNOSIS — R06.09 OTHER FORMS OF DYSPNEA: ICD-10-CM

## 2018-09-28 PROCEDURE — 71250 CT THORAX DX C-: CPT

## 2019-05-22 ENCOUNTER — HOSPITAL ENCOUNTER (OUTPATIENT)
Dept: MRI IMAGING | Age: 84
Discharge: HOME | End: 2019-05-22
Attending: INTERNAL MEDICINE
Payer: COMMERCIAL

## 2019-05-22 DIAGNOSIS — M48.07 LUMBOSACRAL STENOSIS: ICD-10-CM

## 2019-05-22 LAB — CREAT UR-MCNC: 1.1 MG/DL (ref 0.6–1.3)

## 2019-05-22 PROCEDURE — 74011636320 HC RX REV CODE- 636/320: Performed by: INTERNAL MEDICINE

## 2019-05-22 PROCEDURE — 82565 ASSAY OF CREATININE: CPT

## 2019-05-22 PROCEDURE — A9575 INJ GADOTERATE MEGLUMI 0.1ML: HCPCS | Performed by: INTERNAL MEDICINE

## 2019-05-22 PROCEDURE — 72158 MRI LUMBAR SPINE W/O & W/DYE: CPT

## 2019-05-22 RX ADMIN — GADOTERATE MEGLUMINE 20 ML: 376.9 INJECTION INTRAVENOUS at 16:21

## 2019-05-28 ENCOUNTER — OFFICE VISIT (OUTPATIENT)
Dept: ORTHOPEDIC SURGERY | Age: 84
End: 2019-05-28

## 2019-05-28 VITALS
BODY MASS INDEX: 27.99 KG/M2 | HEART RATE: 63 BPM | WEIGHT: 206.4 LBS | RESPIRATION RATE: 16 BRPM | SYSTOLIC BLOOD PRESSURE: 93 MMHG | DIASTOLIC BLOOD PRESSURE: 58 MMHG

## 2019-05-28 DIAGNOSIS — Q76.49 CONGENITAL SPINAL STENOSIS OF LUMBAR REGION: Primary | ICD-10-CM

## 2019-05-28 NOTE — PROGRESS NOTES
[facility]  [address]  [place], [address] [place] [address] Street  Phone: [phone]  Fax: [phone]  INITIAL CONSULTATION  Patient: Yobany Saldivar                MRN: [id number]       SSN: [id number]  YOB: 1935        AGE: 81 y.o. SEX: male  Body mass index is 27.99 kg/m². PCP: Cosme Duverney, MD  05/28/19    Chief Complaint   Patient presents with    Back Pain     NEW PATIENT         HISTORY OF PRESENT ILLNESS, RADIOGRAPHS, and PLAN:         HISTORY OF PRESENT ILLNESS:  Mr. Carlitos Jackson is seen today at the request of Dr. Joel Johnston. Mr. Carlitos Jackson is a pleasant, 55-year-old gentleman who has had chronic back pain for at least the past couple of years. It is worse over the past six months with stenotic symptoms and with just back ache primarily that is improved only with forced flexion. He denies bowel or bladder dysfunction, fever, chills, or night sweats, weight loss or weight gain. His only medical problem is relative hypotension with blood pressures running in the 90s/50s. He has no bowel or bladder dysfunction. It limits his quality of life and activities. PHYSICAL EXAMINATION:  His physical exam demonstrates normal strength, sensation, and reflexes. RADIOGRAPHS:  X-rays demonstrate previous low lumbar laminectomy done by Dr. Sondra Lancaster 40-50 years ago from which he did well. He has relative spinal stenosis and inflammatory changes with autofusion of multiple discs. There is spine loss of lordosis. ASSESSMENT/PLAN: We have a patient with relatively classic stenotic symptoms with a relatively stiff spine and congenital spinal stenosis. There is no gross instability. At this point, I am not sure what has worsened his symptoms. My guess is a combination of arterial sclerotic disease and his chronic hypotension. This causes a difficult time for him perfusing his chronically stenotic spine. We will see if it improves with some epidural steroids.   I would try to avoid surgical intervention given that he would likely require multilevel lumbar decompression. This is an extensive surgery given his age. We will get him set up for some epidural steroids and see their effectiveness. cc: Tahir Austin M.D. Past Medical History:   Diagnosis Date    Unspecified essential hypertension 7/19/2017       Family History   Problem Relation Age of Onset    Heart Attack Neg Hx     Stroke Neg Hx        Current Outpatient Medications   Medication Sig Dispense Refill    aspirin delayed-release 81 mg tablet Take 1 Tab by mouth daily. 50 Tab prn    levothyroxine (SYNTHROID) 25 mcg tablet Take  by mouth Daily (before breakfast).  citalopram (CELEXA) 20 mg tablet Take  by mouth daily.  alfuzosin SR (UROXATRAL) 10 mg SR tablet Take  by mouth daily.  testosterone (ANDROGEL) 50 mg/5 gram (1 %) gel 5 g by TransDERmal route daily.  Omega-3-DHA-EPA-Fish Oil 1,000 mg (120 mg-180 mg) cap Take  by mouth.  biotin 1,000 mcg chew Take  by mouth.  zinc 50 mg tab tablet Take  by mouth daily.  ASCORBATE CALCIUM (RUDY-C PO) Take  by mouth.  cholecalciferol (VITAMIN D3) 1,000 unit cap Take  by mouth daily.  co-enzyme Q-10 (CO Q-10) 100 mg capsule Take 100 mg by mouth daily.          Allergies   Allergen Reactions    Seasonale [Levonorgestrel-Ethinyl Estrad] Sneezing     Seasonal allergy not with SEASONALE [LEVONORGESTREL-ETHINYL ESTRAD]       Past Surgical History:   Procedure Laterality Date    HX BACK SURGERY  1971       Past Medical History:   Diagnosis Date    Unspecified essential hypertension 7/19/2017       Social History     Socioeconomic History    Marital status:      Spouse name: Not on file    Number of children: Not on file    Years of education: Not on file    Highest education level: Not on file   Occupational History    Not on file   Social Needs    Financial resource strain: Not on file    Food insecurity: Worry: Not on file     Inability: Not on file    Transportation needs:     Medical: Not on file     Non-medical: Not on file   Tobacco Use    Smoking status: Former Smoker     Last attempt to quit: 1982     Years since quittin.8    Smokeless tobacco: Never Used   Substance and Sexual Activity    Alcohol use: No    Drug use: No    Sexual activity: Not on file   Lifestyle    Physical activity:     Days per week: Not on file     Minutes per session: Not on file    Stress: Not on file   Relationships    Social connections:     Talks on phone: Not on file     Gets together: Not on file     Attends Cheondoism service: Not on file     Active member of club or organization: Not on file     Attends meetings of clubs or organizations: Not on file     Relationship status: Not on file    Intimate partner violence:     Fear of current or ex partner: Not on file     Emotionally abused: Not on file     Physically abused: Not on file     Forced sexual activity: Not on file   Other Topics Concern    Not on file   Social History Narrative    Not on file           REVIEW OF SYSTEMS:   CONSTITUTIONAL SYMPTOMS:  Negative. EYES:  Negative. EARS, NOSE, THROAT AND MOUTH:  Negative. CARDIOVASCULAR:  Negative. RESPIRATORY:  Negative. GENITOURINARY: Per HPI. GASTROINTESTINAL:  Per HPI. INTEGUMENTARY (SKIN AND/OR BREAST):  Negative. MUSCULOSKELETAL: Per HPI.   ENDOCRINE/RHEUMATOLOGIC:  Negative. NEUROLOGICAL:  Per HPI. HEMATOLOGIC/LYMPHATIC:  Negative. ALLERGIC/IMMUNOLOGIC:  Negative. PSYCHIATRIC:  Negative. PHYSICAL EXAMINATION:   Visit Vitals  BP 93/58 (BP 1 Location: Left arm, BP Patient Position: Sitting)   Pulse 63   Resp 16   Wt 206 lb 6.4 oz (93.6 kg)   BMI 27.99 kg/m²    PAIN SCALE: 3/10    CONSTITUTIONAL: The patient is in no apparent distress and is alert and oriented x 3. HEENT: Normocephalic. Hearing grossly intact.   NECK: Supple and symmetric. no tenderness, or masses were felt.  RESPIRATORY: No labored breathing. CARDIOVASCULAR: The carotid pulses were normal. Peripheral pulses were 2+. CHEST: Normal AP diameter and normal contour without any kyphoscoliosis. LYMPHATIC: No lymphadenopathy was appreciated in the neck, axillae or groin. SKIN:  Negative for scars, rashes, lesions, or ulcers on the right upper, right lower, left upper, left lower and trunk. NEUROLOGICAL: Alert and oriented x 3. Ambulation with single point cane. FWB. EXTREMITIES:  See musculoskeletal.  MUSCULOSKELETAL:   Head and Neck:  Negative for misalignment, asymmetry, crepitation, defects, tenderness masses or effusions.  Left Upper Extremity: Inspection, percussion and palpation performed. Juarezs sign is negative.  Right Upper Extremity: Inspection, percussion and palpation performed. Juarezs sign is negative.  Spine, Ribs and Pelvis: Back ache. + shopping cart sign. Inspection, percussion and palpation performed. Negative for misalignment, asymmetry, crepitation, defects, tenderness masses or effusions.  Left Lower Extremity: Inspection, percussion and palpation performed. Negative straight leg raise.  Right Lower Extremity: Inspection, percussion and palpation performed. Negative straight leg raise. SPINE EXAM:     Cervical spine: Neck is midline. Normal muscle tone. No focal atrophy is noted. Lumbar spine: No rash, ecchymosis, or gross obliquity. No focal atrophy is noted. ASSESSMENT    ICD-10-CM ICD-9-CM    1. Congenital spinal stenosis of lumbar region Q76.49 756.19 SCHEDULE SURGERY       Written by Kim Brito, as dictated by Jaelyn Whitman MD.    I, Dr. Jaelyn Whitman MD, confirm that all documentation is accurate.

## 2019-05-30 ENCOUNTER — APPOINTMENT (OUTPATIENT)
Dept: GENERAL RADIOLOGY | Age: 84
End: 2019-05-30
Attending: PHYSICAL MEDICINE & REHABILITATION
Payer: COMMERCIAL

## 2019-05-30 ENCOUNTER — HOSPITAL ENCOUNTER (OUTPATIENT)
Age: 84
Setting detail: OUTPATIENT SURGERY
Discharge: HOME | End: 2019-05-30
Attending: PHYSICAL MEDICINE & REHABILITATION | Admitting: PHYSICAL MEDICINE & REHABILITATION
Payer: COMMERCIAL

## 2019-05-30 VITALS
DIASTOLIC BLOOD PRESSURE: 69 MMHG | RESPIRATION RATE: 18 BRPM | HEIGHT: 72 IN | WEIGHT: 206 LBS | OXYGEN SATURATION: 98 % | TEMPERATURE: 98.5 F | HEART RATE: 61 BPM | SYSTOLIC BLOOD PRESSURE: 122 MMHG | BODY MASS INDEX: 27.9 KG/M2

## 2019-05-30 PROCEDURE — 74011636320 HC RX REV CODE- 636/320: Performed by: PHYSICAL MEDICINE & REHABILITATION

## 2019-05-30 PROCEDURE — 74011250637 HC RX REV CODE- 250/637: Performed by: PHYSICAL MEDICINE & REHABILITATION

## 2019-05-30 PROCEDURE — 77030014124 HC TY EPDRL BBMI -A: Performed by: PHYSICAL MEDICINE & REHABILITATION

## 2019-05-30 PROCEDURE — 76010000009 HC PAIN MGT 0 TO 30 MIN PROC: Performed by: PHYSICAL MEDICINE & REHABILITATION

## 2019-05-30 PROCEDURE — 74011250636 HC RX REV CODE- 250/636: Performed by: PHYSICAL MEDICINE & REHABILITATION

## 2019-05-30 RX ORDER — DIAZEPAM 5 MG/1
TABLET ORAL
Status: DISPENSED
Start: 2019-05-30 | End: 2019-05-31

## 2019-05-30 RX ORDER — DIAZEPAM 5 MG/1
5-20 TABLET ORAL ONCE
Status: COMPLETED | OUTPATIENT
Start: 2019-05-30 | End: 2019-05-30

## 2019-05-30 RX ORDER — DEXAMETHASONE SODIUM PHOSPHATE 100 MG/10ML
INJECTION INTRAMUSCULAR; INTRAVENOUS AS NEEDED
Status: DISCONTINUED | OUTPATIENT
Start: 2019-05-30 | End: 2019-05-30 | Stop reason: HOSPADM

## 2019-05-30 RX ORDER — LIDOCAINE HYDROCHLORIDE 10 MG/ML
INJECTION, SOLUTION EPIDURAL; INFILTRATION; INTRACAUDAL; PERINEURAL AS NEEDED
Status: DISCONTINUED | OUTPATIENT
Start: 2019-05-30 | End: 2019-05-30 | Stop reason: HOSPADM

## 2019-05-30 RX ADMIN — DIAZEPAM 10 MG: 5 TABLET ORAL at 12:39

## 2019-05-30 NOTE — PROCEDURES
Intralaminar Epidural Steroid Block Procedure Note      Patient Name: Alyssa Borges    Date of Procedure: May 30, 2019    Preoperative Diagnosis: Q76.49 CONGENITAL SPINAL STENOSIS    Post Operative Diagnosis: Q76.49 CONGENITAL SPINAL STENOSIS    Procedure: L3-L4 Epidural Block     PROCEDURE:  Lumbar Epidural Block    Consent:  Informed  Consent was obtained prior to the procedure. The patient was given the opportunity to ask questions regarding the procedure and its associated risks. In addition to the potential risks associated with the procedure itself, the patient was informed both verbally and in writing of potential side effects of the use glucocorticoids. The patient appeared to comprehend the informed consent and desired to have the procedure performed. The patient was placed in the prone position on the fluoroscopy table and the back prepped and draped in the usual sterile manner. The interlaminar space was identified using fluoroscopy and the skin anesthetized with 1% Lidocaine. A #18 Tuohy Epidural needle was advanced under fluoroscopic control from a paramedian approach to the  epidural space as noted above, using the loss of resistance to fluid technique. Then, 30 mg of preservative free Dexamethasone and 4 cc of Lidocaine was slowly injected. The patient tolerated the procedure well. The injection area was cleaned and band aids applied. No excessive bleeding was noted. Patient dressed and was discharged to home with instructions.

## 2019-05-30 NOTE — DISCHARGE INSTRUCTIONS
[facility]   ([place])  Dr. Brock Pritchett, Dr. CASTELLANO [facility], Dr. Shakira Chang not drive a car, operate heavy machinery or dangerous equipment for 24 hours. * Activity as tolerated; rest for the remainder of the day. * Resume pre-block medications including those for your family doctor. * Do not drink alcoholic beverages for 24 hours. Alcohol and the medications you have received may interact and cause an adverse reaction. * You may feel better this evening and worse tomorrow, as the numbing medications wears off and the steroid has yet to begin to work. After 48 hrs the steroid should begin to release bringing you relief. * You may shower this evening and remove any bandages. * Avoid hot tubs and heating pads for 24 hours. You may use cold packs on the procedure site as tolerated for the first 24 hours. * If a headache develops, drink plenty of fluids and rest.  Take over the counter medications for headache if needed. If the headache continues longer than 24 hours, call MD at the [address] [place] [address]. [phone]    * Continue taking pain medications as needed. * You may resume your regular diet if tolerated. Otherwise, start with sips of water and advance slowly. * If Diabetic: check your blood sugar three times a day for the next 3 days. If your sugar is greater than 300 call your family doctor. If your sugar is greater than 400, have someone transport you to the nearest Emergency Room. * If you experience any of the following problems, Please Call the [address] [place] [address] at [phone].         * Shortness of Breath    * Fever of 101 or higher    * Nausea / Vomiting    * Severe Headache    * Weakness or numbness in arms or legs that is not      resolving    * Prolonged increase in pain greater than 4 days      DISCHARGE SUMMARY from Nurse      PATIENT INSTRUCTIONS:    After oral sedation, for 24 hours or while taking prescription Narcotics:  · Limit your activities  · Do not drive and operate hazardous machinery  · Do not make important personal or business decisions  · Do  not drink alcoholic beverages  · If you have not urinated within 8 hours after discharge, please contact your surgeon on call. Report the following to your surgeon:  · Excessive pain, swelling, redness or odor of or around the surgical area  · Temperature over 101  · Nausea and vomiting lasting longer than 4 hours or if unable to take medications  · Any signs of decreased circulation or nerve impairment to extremity: change in color, persistent  numbness, tingling, coldness or increase pain  · Any questions            What to do at Home:  Recommended activity: Activity as tolerated, NO DRIVING FOR 12 Hours post injection          *  Please give a list of your current medications to your Primary Care Provider. *  Please update this list whenever your medications are discontinued, doses are      changed, or new medications (including over-the-counter products) are added. *  Please carry medication information at all times in case of emergency situations. These are general instructions for a healthy lifestyle:    No smoking/ No tobacco products/ Avoid exposure to second hand smoke    Surgeon General's Warning:  Quitting smoking now greatly reduces serious risk to your health. Obesity, smoking, and sedentary lifestyle greatly increases your risk for illness    A healthy diet, regular physical exercise & weight monitoring are important for maintaining a healthy lifestyle    You may be retaining fluid if you have a history of heart failure or if you experience any of the following symptoms:  Weight gain of 3 pounds or more overnight or 5 pounds in a week, increased swelling in our hands or feet or shortness of breath while lying flat in bed.   Please call your doctor as soon as you notice any of these symptoms; do not wait until your next office visit. Recognize signs and symptoms of STROKE:    F-face looks uneven    A-arms unable to move or move unevenly    S-speech slurred or non-existent    T-time-call 911 as soon as signs and symptoms begin-DO NOT go       Back to bed or wait to see if you get better-TIME IS BRAIN.

## 2019-07-02 ENCOUNTER — OFFICE VISIT (OUTPATIENT)
Dept: ORTHOPEDIC SURGERY | Age: 84
End: 2019-07-02

## 2019-07-02 VITALS
SYSTOLIC BLOOD PRESSURE: 84 MMHG | HEART RATE: 65 BPM | OXYGEN SATURATION: 99 % | TEMPERATURE: 98.2 F | HEIGHT: 72 IN | WEIGHT: 203 LBS | RESPIRATION RATE: 18 BRPM | BODY MASS INDEX: 27.5 KG/M2 | DIASTOLIC BLOOD PRESSURE: 45 MMHG

## 2019-07-02 DIAGNOSIS — Q76.49 CONGENITAL SPINAL STENOSIS OF LUMBAR REGION: Primary | ICD-10-CM

## 2019-07-02 DIAGNOSIS — I95.89 CHRONIC HYPOTENSION: ICD-10-CM

## 2019-07-02 NOTE — PROGRESS NOTES
Chief complaint/History of Present Illness:  Chief Complaint   Patient presents with    Back Pain     back pain     HPI  Radhika Miles is a  81 y.o.  male      HISTORY OF PRESENT ILLNESS:  The patient comes in today to followup on his L3-4 epidural steroid injection done May 30, 2019. He states it did not help at all. The patient has chronic low back pain. He states when he walks, it will go up from his low back to his neck. He does not have any leg pain. He has a history of a laminectomy by Dr. Kimani Sparks 40 years ago. He has done physical therapy. He exercises about 10 minutes a day. He rates his pain as 8/10 with walking and 1/10 with sitting. He has chronic hypotension. His blood pressure today is 84/45. He does get dizzy at times when his blood pressure is low. He is retired. He is a nonsmoker. PHYSICAL EXAM:  Mr. Dixon Montalvo is an 61-year-old male. He is alert and oriented. He has a normal mood and affect. He has a full weightbearing, non-antalgic gait with a field hockey stick that he uses as a cane. He has 4/5 strength of the bilateral lower extremities and negative straight leg raise. 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 I spoke with Dr. Priscilla Pena. He believes the patients pain is related to his chronic hypotension. He is not getting enough profusion to his spine. So, we will fit him with a brace today to see if that will help some of his back pain, but he will see his PCP, Dr. Tatiana Segovia, regarding his chronic hypotension. He, apparently, has been given medication in the past for that. He has seen Dr. Kimberly Guerra in the past.  I offered him a referral back to him, but he states he will just see his PCP. We will see him back as needed.        Review of systems:    Past Medical History:   Diagnosis Date    Unspecified essential hypertension 7/19/2017     Past Surgical History:   Procedure Laterality Date    HX BACK SURGERY      Social History     Socioeconomic History    Marital status:      Spouse name: Not on file    Number of children: Not on file    Years of education: Not on file    Highest education level: Not on file   Occupational History    Not on file   Social Needs    Financial resource strain: Not on file    Food insecurity:     Worry: Not on file     Inability: Not on file    Transportation needs:     Medical: Not on file     Non-medical: Not on file   Tobacco Use    Smoking status: Former Smoker     Last attempt to quit: 1982     Years since quittin.9    Smokeless tobacco: Never Used   Substance and Sexual Activity    Alcohol use: No    Drug use: No    Sexual activity: Not on file   Lifestyle    Physical activity:     Days per week: Not on file     Minutes per session: Not on file    Stress: Not on file   Relationships    Social connections:     Talks on phone: Not on file     Gets together: Not on file     Attends Orthodox service: Not on file     Active member of club or organization: Not on file     Attends meetings of clubs or organizations: Not on file     Relationship status: Not on file    Intimate partner violence:     Fear of current or ex partner: Not on file     Emotionally abused: Not on file     Physically abused: Not on file     Forced sexual activity: Not on file   Other Topics Concern    Not on file   Social History Narrative    Not on file     Family History   Problem Relation Age of Onset    Heart Attack Neg Hx     Stroke Neg Hx        Physical Exam:  Visit Vitals  BP (!) 84/45   Pulse 65   Temp 98.2 °F (36.8 °C)   Resp 18   Ht 6' (1.829 m)   Wt 203 lb (92.1 kg)   SpO2 99%   BMI 27.53 kg/m²     Pain Scale: 8/10       has been . reviewed and is appropriate          Diagnoses and all orders for this visit:    1. Congenital spinal stenosis of lumbar region  -     AMB SUPPLY ORDER    2.  Chronic hypotension            Follow-up and Dispositions    · Return if symptoms worsen or fail to improve.              We have informed Kristi Patten to notify us for immediate appointment if he has any worsening neurogical symptoms or if an emergency situation presents, then call [phone]

## 2019-09-13 ENCOUNTER — OFFICE VISIT (OUTPATIENT)
Dept: CARDIOLOGY CLINIC | Age: 84
End: 2019-09-13

## 2019-09-13 VITALS
HEART RATE: 55 BPM | HEIGHT: 72 IN | OXYGEN SATURATION: 97 % | BODY MASS INDEX: 27.22 KG/M2 | DIASTOLIC BLOOD PRESSURE: 70 MMHG | SYSTOLIC BLOOD PRESSURE: 120 MMHG | WEIGHT: 201 LBS

## 2019-09-13 DIAGNOSIS — I45.2 BIFASCICULAR BLOCK: ICD-10-CM

## 2019-09-13 DIAGNOSIS — R00.1 BRADYCARDIA: ICD-10-CM

## 2019-09-13 DIAGNOSIS — I45.2 RBBB (RIGHT BUNDLE BRANCH BLOCK WITH LEFT ANTERIOR FASCICULAR BLOCK): Primary | ICD-10-CM

## 2019-09-13 DIAGNOSIS — R94.31 ABNORMAL EKG: ICD-10-CM

## 2019-09-13 RX ORDER — HYDROXYCHLOROQUINE SULFATE 200 MG/1
200 TABLET, FILM COATED ORAL 2 TIMES DAILY
COMMUNITY
End: 2021-02-26

## 2019-09-13 NOTE — PROGRESS NOTES
HISTORY OF PRESENT ILLNESS  Darrius Lai is a 81 y.o. male. HPI    Patient presents for a new office visit. He was seen in the past by 1 of my colleagues from [facility]. He previously underwent noninvasive cardiac testing back in August 2017 including a pharmacologic nuclear stress test which was a normal low risk study and an echocardiogram showing a preserved LV systolic function, EF 93%, grade 1 diastolic dysfunction with mild mitral and tricuspid regurgitation. Patient also has a history of labile blood pressure and dyslipidemia. He was referred here by his PCP for evaluation of bradycardia and a bifascicular block. Patient reports that he was always told he had a slow heart rate usually in the 50s. He specifically denies any increased dizziness, increased fatigue, syncope or near syncope. No change in his activity level. He has noticed that his blood pressure tends to go up on his heart rate goes down and vice versa. He does complain of headaches when his blood pressure is elevated. Past Medical History:   Diagnosis Date    Unspecified essential hypertension 7/19/2017     Current Outpatient Medications   Medication Sig Dispense Refill    MILK THISTLE PO Take  by mouth.  docusate sodium (STOOL SOFTENER PO) Take  by mouth.  hydroxychloroquine (PLAQUENIL) 200 mg tablet Take 200 mg by mouth daily.  levothyroxine (SYNTHROID) 25 mcg tablet Take  by mouth Daily (before breakfast).  citalopram (CELEXA) 20 mg tablet Take  by mouth daily.  alfuzosin SR (UROXATRAL) 10 mg SR tablet Take  by mouth daily.  biotin 1,000 mcg chew Take  by mouth.  zinc 50 mg tab tablet Take  by mouth daily.  ASCORBATE CALCIUM (RUDY-C PO) Take  by mouth.  cholecalciferol (VITAMIN D3) 1,000 unit cap Take  by mouth daily.  co-enzyme Q-10 (CO Q-10) 100 mg capsule Take 100 mg by mouth daily.        Allergies   Allergen Reactions    Seasonale [Levonorgestrel-Ethinyl Estrad] Sneezing     Seasonal allergy not with SEASONALE [LEVONORGESTREL-ETHINYL ESTRAD]      Social History     Tobacco Use    Smoking status: Former Smoker     Last attempt to quit: 1982     Years since quittin.1    Smokeless tobacco: Never Used   Substance Use Topics    Alcohol use: No    Drug use: No     Family History   Problem Relation Age of Onset    Cancer Sister     Heart Attack Neg Hx     Stroke Neg Hx          Review of Systems   Constitutional: Negative for chills, fever and weight loss. HENT: Negative for nosebleeds. Eyes: Negative for blurred vision and double vision. Respiratory: Negative for cough, shortness of breath and wheezing. Cardiovascular: Negative for chest pain, palpitations, orthopnea, claudication, leg swelling and PND. Gastrointestinal: Negative for abdominal pain, heartburn, nausea and vomiting. Genitourinary: Negative for dysuria and hematuria. Musculoskeletal: Negative for falls and myalgias. Skin: Negative for rash. Neurological: Positive for headaches. Negative for dizziness and focal weakness. Endo/Heme/Allergies: Does not bruise/bleed easily. Psychiatric/Behavioral: Negative for substance abuse. Visit Vitals  /70 (BP 1 Location: Left arm, BP Patient Position: Sitting)   Pulse (!) 55   Ht 6' (1.829 m)   Wt 91.2 kg (201 lb)   SpO2 97%   BMI 27.26 kg/m²       Physical Exam   Constitutional: He is oriented to person, place, and time. He appears well-developed and well-nourished. HENT:   Head: Normocephalic and atraumatic. Eyes: Conjunctivae are normal.   Neck: Neck supple. No JVD present. Carotid bruit is not present. Cardiovascular: Regular rhythm, S1 normal, S2 normal and normal pulses. Bradycardia present. Exam reveals no gallop and no S3. No murmur heard. Pulmonary/Chest: Breath sounds normal. He has no wheezes. He has no rales. Abdominal: Soft. Bowel sounds are normal. There is no tenderness.    Musculoskeletal: He exhibits no edema, tenderness or deformity. Neurological: He is alert and oriented to person, place, and time. Skin: Skin is warm and dry. Psychiatric: He has a normal mood and affect. His behavior is normal. Thought content normal.     EKG: Sinus bradycardia, first-degree AV block, right bundle branch block with left anterior fascicular block. Nonspecific ST abnormality. No change compared to the previous EKG. ASSESSMENT and PLAN    Right bundle branch block with left anterior fascicular block. This was present on a prior EKG dating back to 2017 as well. No concerning symptoms at this point for high-grade AV block. However, I would like to arrange for a 48-hour Holter monitor to ensure this is not happening without symptoms. No indication for a permanent pacemaker at this point. Labile hypertension. Patient may have a component of autonomic dysfunction. At this point I would not be overly aggressive at lowering his blood pressure. Dyslipidemia. Patient has been managing this with fish oil and lifestyle modification. I will defer further management to his PCP. Total visit time was 40 minutes of which greater than 50% was face-to-face counseling. Follow-up in 4 months, sooner if needed.

## 2019-09-13 NOTE — PROGRESS NOTES
Emiliano Allan presents today for   Chief Complaint   Patient presents with    New Patient     referred by PCP - was told he needed pacemaker     Shortness of Breath     exertion       Emiliano Allan preferred language for health care discussion is english/other. Is someone accompanying this pt? Wife     Is the patient using any DME equipment during OV? Cane     Depression Screening:  3 most recent PHQ Screens 9/13/2019   PHQ Not Done -   Little interest or pleasure in doing things Not at all   Feeling down, depressed, irritable, or hopeless Not at all   Total Score PHQ 2 0       Learning Assessment:  Learning Assessment 9/13/2019   PRIMARY LEARNER Patient   BARRIERS PRIMARY LEARNER -   CO-LEARNER CAREGIVER -   PRIMARY LANGUAGE ENGLISH   LEARNER PREFERENCE PRIMARY DEMONSTRATION   ANSWERED BY Patient   RELATIONSHIP SELF       Abuse Screening:  Abuse Screening Questionnaire 9/13/2019   Do you ever feel afraid of your partner? N   Are you in a relationship with someone who physically or mentally threatens you? N   Is it safe for you to go home? Y       Fall Risk  Fall Risk Assessment, last 12 mths 9/13/2019   Able to walk? Yes   Fall in past 12 months? No       Pt currently taking Anticoagulant therapy? no    Coordination of Care:  1. Have you been to the ER, urgent care clinic since your last visit? Hospitalized since your last visit? no    2. Have you seen or consulted any other health care providers outside of the [address] [place] [address] since your last visit? Include any pap smears or colon screening.  no

## 2019-09-13 NOTE — PATIENT INSTRUCTIONS
A  will call you to schedule your cardiac testing.  If you do not receive a phone call within 48 hours or miss it you may call; Central Scheduling [phone]

## 2019-09-23 ENCOUNTER — HOSPITAL ENCOUNTER (OUTPATIENT)
Dept: NON INVASIVE DIAGNOSTICS | Age: 84
Discharge: HOME | End: 2019-09-23
Attending: INTERNAL MEDICINE
Payer: MEDICARE

## 2019-09-23 DIAGNOSIS — R00.1 BRADYCARDIA: ICD-10-CM

## 2019-09-23 DIAGNOSIS — I45.2 BIFASCICULAR BLOCK: ICD-10-CM

## 2019-09-23 PROCEDURE — 93226 XTRNL ECG REC<48 HR SCAN A/R: CPT

## 2019-10-02 NOTE — PROGRESS NOTES
Per your last note\" Right bundle branch block with left anterior fascicular block. This was present on a prior EKG dating back to 2017 as well. No concerning symptoms at this point for high-grade AV block. However, I would like to arrange for a 48-hour Holter monitor to ensure this is not happening without symptoms.   No indication for a permanent pacemaker at this point

## 2019-10-04 ENCOUNTER — TELEPHONE (OUTPATIENT)
Dept: CARDIOLOGY CLINIC | Age: 84
End: 2019-10-04

## 2019-10-04 NOTE — TELEPHONE ENCOUNTER
----- Message from Kathy Weiss MD sent at 10/2/2019  5:27 PM EDT -----  Please let the patient know that his Holter monitor was unremarkable  ----- Message -----  From: Indu Valladares LPN  Sent: [id number]   2:06 PM EDT  To: Kathy Weiss MD    Per your last note\" Right bundle branch block with left anterior fascicular block. This was present on a prior EKG dating back to 2017 as well. No concerning symptoms at this point for high-grade AV block. However, I would like to arrange for a 48-hour Holter monitor to ensure this is not happening without symptoms.   No indication for a permanent pacemaker at this point

## 2020-07-21 PROCEDURE — 99284 EMERGENCY DEPT VISIT MOD MDM: CPT

## 2020-07-21 PROCEDURE — 96374 THER/PROPH/DIAG INJ IV PUSH: CPT

## 2020-07-22 ENCOUNTER — HOSPITAL ENCOUNTER (EMERGENCY)
Age: 85
Discharge: HOME | End: 2020-07-22
Attending: EMERGENCY MEDICINE
Payer: COMMERCIAL

## 2020-07-22 VITALS
BODY MASS INDEX: 27.09 KG/M2 | RESPIRATION RATE: 16 BRPM | HEIGHT: 72 IN | DIASTOLIC BLOOD PRESSURE: 55 MMHG | WEIGHT: 200 LBS | HEART RATE: 67 BPM | TEMPERATURE: 98.5 F | OXYGEN SATURATION: 100 % | SYSTOLIC BLOOD PRESSURE: 139 MMHG

## 2020-07-22 DIAGNOSIS — N39.0 URINARY TRACT INFECTION WITHOUT HEMATURIA, SITE UNSPECIFIED: Primary | ICD-10-CM

## 2020-07-22 LAB
ALBUMIN SERPL-MCNC: 3.6 G/DL (ref 3.4–5)
ALBUMIN/GLOB SERPL: 1 {RATIO} (ref 0.8–1.7)
ALP SERPL-CCNC: 65 U/L (ref 45–117)
ALT SERPL-CCNC: 17 U/L (ref 16–61)
ANION GAP SERPL CALC-SCNC: 4 MMOL/L (ref 3–18)
APPEARANCE UR: ABNORMAL
APTT PPP: 25.9 SEC (ref 23–36.4)
AST SERPL-CCNC: 14 U/L (ref 10–38)
BACTERIA URNS QL MICRO: ABNORMAL /HPF
BASOPHILS # BLD: 0 K/UL (ref 0–0.1)
BASOPHILS NFR BLD: 0 % (ref 0–2)
BILIRUB SERPL-MCNC: 1.2 MG/DL (ref 0.2–1)
BILIRUB UR QL: NEGATIVE
BUN SERPL-MCNC: 24 MG/DL (ref 7–18)
BUN/CREAT SERPL: 20 (ref 12–20)
CALCIUM SERPL-MCNC: 9 MG/DL (ref 8.5–10.1)
CHLORIDE SERPL-SCNC: 100 MMOL/L (ref 100–111)
CO2 SERPL-SCNC: 26 MMOL/L (ref 21–32)
COLOR UR: YELLOW
CREAT SERPL-MCNC: 1.18 MG/DL (ref 0.6–1.3)
DIFFERENTIAL METHOD BLD: ABNORMAL
EOSINOPHIL # BLD: 0.1 K/UL (ref 0–0.4)
EOSINOPHIL NFR BLD: 1 % (ref 0–5)
EPITH CASTS URNS QL MICRO: NEGATIVE /LPF (ref 0–5)
ERYTHROCYTE [DISTWIDTH] IN BLOOD BY AUTOMATED COUNT: 12.6 % (ref 11.6–14.5)
GLOBULIN SER CALC-MCNC: 3.7 G/DL (ref 2–4)
GLUCOSE SERPL-MCNC: 104 MG/DL (ref 74–99)
GLUCOSE UR STRIP.AUTO-MCNC: NEGATIVE MG/DL
HCT VFR BLD AUTO: 34.7 % (ref 36–48)
HGB BLD-MCNC: 11.7 G/DL (ref 13–16)
HGB UR QL STRIP: ABNORMAL
INR PPP: 1.1 (ref 0.8–1.2)
KETONES UR QL STRIP.AUTO: NEGATIVE MG/DL
LEUKOCYTE ESTERASE UR QL STRIP.AUTO: ABNORMAL
LYMPHOCYTES # BLD: 1.5 K/UL (ref 0.9–3.6)
LYMPHOCYTES NFR BLD: 11 % (ref 21–52)
MCH RBC QN AUTO: 31.7 PG (ref 24–34)
MCHC RBC AUTO-ENTMCNC: 33.7 G/DL (ref 31–37)
MCV RBC AUTO: 94 FL (ref 74–97)
MONOCYTES # BLD: 1.2 K/UL (ref 0.05–1.2)
MONOCYTES NFR BLD: 9 % (ref 3–10)
NEUTS SEG # BLD: 11.1 K/UL (ref 1.8–8)
NEUTS SEG NFR BLD: 79 % (ref 40–73)
NITRITE UR QL STRIP.AUTO: POSITIVE
PH UR STRIP: 5 [PH] (ref 5–8)
PLATELET # BLD AUTO: 175 K/UL (ref 135–420)
PMV BLD AUTO: 10.1 FL (ref 9.2–11.8)
POTASSIUM SERPL-SCNC: 4.6 MMOL/L (ref 3.5–5.5)
PROT SERPL-MCNC: 7.3 G/DL (ref 6.4–8.2)
PROT UR STRIP-MCNC: ABNORMAL MG/DL
PROTHROMBIN TIME: 14.2 SEC (ref 11.5–15.2)
RBC # BLD AUTO: 3.69 M/UL (ref 4.7–5.5)
RBC #/AREA URNS HPF: ABNORMAL /HPF (ref 0–5)
SODIUM SERPL-SCNC: 130 MMOL/L (ref 136–145)
SP GR UR REFRACTOMETRY: 1.01 (ref 1–1.03)
UROBILINOGEN UR QL STRIP.AUTO: 1 EU/DL (ref 0.2–1)
WBC # BLD AUTO: 13.8 K/UL (ref 4.6–13.2)
WBC URNS QL MICRO: ABNORMAL /HPF (ref 0–4)

## 2020-07-22 PROCEDURE — 80053 COMPREHEN METABOLIC PANEL: CPT

## 2020-07-22 PROCEDURE — 74011000250 HC RX REV CODE- 250: Performed by: EMERGENCY MEDICINE

## 2020-07-22 PROCEDURE — 96374 THER/PROPH/DIAG INJ IV PUSH: CPT

## 2020-07-22 PROCEDURE — 85730 THROMBOPLASTIN TIME PARTIAL: CPT

## 2020-07-22 PROCEDURE — 74011250636 HC RX REV CODE- 250/636: Performed by: EMERGENCY MEDICINE

## 2020-07-22 PROCEDURE — 85025 COMPLETE CBC W/AUTO DIFF WBC: CPT

## 2020-07-22 PROCEDURE — 85610 PROTHROMBIN TIME: CPT

## 2020-07-22 PROCEDURE — 81001 URINALYSIS AUTO W/SCOPE: CPT

## 2020-07-22 RX ORDER — NITROFURANTOIN (MACROCRYSTALS) 100 MG/1
100 CAPSULE ORAL 2 TIMES DAILY
Qty: 14 CAP | Refills: 0 | Status: SHIPPED | OUTPATIENT
Start: 2020-07-22 | End: 2020-07-29

## 2020-07-22 RX ORDER — CEFTRIAXONE 1 G/1
1 INJECTION, POWDER, FOR SOLUTION INTRAMUSCULAR; INTRAVENOUS
Status: DISCONTINUED | OUTPATIENT
Start: 2020-07-22 | End: 2020-07-22

## 2020-07-22 RX ADMIN — SODIUM CHLORIDE 1000 ML: 900 INJECTION, SOLUTION INTRAVENOUS at 01:55

## 2020-07-22 RX ADMIN — WATER 1 G: 1 INJECTION INTRAMUSCULAR; INTRAVENOUS; SUBCUTANEOUS at 02:46

## 2020-07-22 NOTE — ED TRIAGE NOTES
Pt. Reports not able to void really well and [facility] this afternoon when he was diagnosed with thrombocytopenia, polycythemia, and a urinary tract infection with hematuria. Pt reports they referred him to the Emergency room and they did not prescribe any antibiotics.

## 2020-07-22 NOTE — ED PROVIDER NOTES
HPI patient is a 71-year-old male who complains of discomfort with urination x2 to 3 days. [facility] today and diagnosed with thrombocytosis and UTI. They sent him to the ER for further evaluation.     Past Medical History:   Diagnosis Date    Unspecified essential hypertension 2017       Past Surgical History:   Procedure Laterality Date    HX BACK SURGERY  1971         Family History:   Problem Relation Age of Onset    Cancer Sister     Heart Attack Neg Hx     Stroke Neg Hx        Social History     Socioeconomic History    Marital status:      Spouse name: Not on file    Number of children: Not on file    Years of education: Not on file    Highest education level: Not on file   Occupational History    Not on file   Social Needs    Financial resource strain: Not on file    Food insecurity     Worry: Not on file     Inability: Not on file    Transportation needs     Medical: Not on file     Non-medical: Not on file   Tobacco Use    Smoking status: Former Smoker     Last attempt to quit: 1982     Years since quittin.0    Smokeless tobacco: Never Used   Substance and Sexual Activity    Alcohol use: No    Drug use: No    Sexual activity: Not on file   Lifestyle    Physical activity     Days per week: Not on file     Minutes per session: Not on file    Stress: Not on file   Relationships    Social connections     Talks on phone: Not on file     Gets together: Not on file     Attends Confucianist service: Not on file     Active member of club or organization: Not on file     Attends meetings of clubs or organizations: Not on file     Relationship status: Not on file    Intimate partner violence     Fear of current or ex partner: Not on file     Emotionally abused: Not on file     Physically abused: Not on file     Forced sexual activity: Not on file   Other Topics Concern    Not on file   Social History Narrative    Not on file         ALLERGIES: Seasonale [levonorgestrel-ethinyl estrad]    Review of Systems   Constitutional: Negative. HENT: Negative. Eyes: Negative. Respiratory: Negative. Cardiovascular: Negative. Gastrointestinal: Negative. Endocrine: Negative. Genitourinary: Negative. Musculoskeletal: Negative. Skin: Negative. Allergic/Immunologic: Negative. Neurological: Negative. Hematological: Negative. Psychiatric/Behavioral: Negative. All other systems reviewed and are negative. Vitals:    07/22/20 0050   BP: 92/53   Pulse: 70   Resp: 18   Temp: 98.8 °F (37.1 °C)   SpO2: 100%   Weight: 90.7 kg (200 lb)   Height: 6' (1.829 m)            Physical Exam  Vitals signs and nursing note reviewed. Constitutional:       General: He is not in acute distress. Appearance: He is well-developed. HENT:      Head: Normocephalic. Eyes:      Conjunctiva/sclera: Conjunctivae normal.      Pupils: Pupils are equal, round, and reactive to light. Neck:      Musculoskeletal: Normal range of motion and neck supple. Cardiovascular:      Rate and Rhythm: Normal rate and regular rhythm. Heart sounds: Normal heart sounds. No murmur. Pulmonary:      Effort: Pulmonary effort is normal. No respiratory distress. Breath sounds: Normal breath sounds. No wheezing or rales. Chest:      Chest wall: No tenderness. Abdominal:      General: Bowel sounds are normal. There is no distension. Palpations: Abdomen is soft. Tenderness: There is no abdominal tenderness. There is no rebound. Musculoskeletal: Normal range of motion. General: No tenderness. Skin:     General: Skin is warm and dry. Findings: No rash. Neurological:      Mental Status: He is alert and oriented to person, place, and time. Cranial Nerves: No cranial nerve deficit. Motor: No abnormal muscle tone. Coordination: Coordination normal.   Psychiatric:         Behavior: Behavior normal.         Thought Content:  Thought content normal.         Judgment: Judgment normal.          MDM  Number of Diagnoses or Management Options  Risk of Complications, Morbidity, and/or Mortality  Presenting problems: low         Procedures    Dx: UTI, Rx: cipro 500 mg po  bid x 10 days. Disp: D/C     Dictation disclaimer:  Please note that this dictation was completed with [employer], the computer voice recognition software. Quite often unanticipated grammatical, syntax, homophones, and other interpretive errors are inadvertently transcribed by the computer software. Please disregard these errors. Please excuse any errors that have escaped final proofreading.

## 2020-07-22 NOTE — DISCHARGE INSTRUCTIONS
Patient Education        Urinary Tract Infections in Men: Care Instructions  Your Care Instructions     A urinary tract infection, or UTI, is a general term for an infection anywhere between the kidneys and the tip of the penis. UTIs can also be a result of a prostate problem. Most cause pain or burning when you urinate. Most UTIs are caused by bacteria and can be cured with antibiotics. It is important to complete your treatment so that the infection does not get worse. Follow-up care is a key part of your treatment and safety. Be sure to make and go to all appointments, and call your doctor if you are having problems. It's also a good idea to know your test results and keep a list of the medicines you take. How can you care for yourself at home? · Take your antibiotics as prescribed. Do not stop taking them just because you feel better. You need to take the full course of antibiotics. · Take your medicines exactly as prescribed. Your doctor may have prescribed a medicine, such as phenazopyridine (Pyridium), to help relieve pain when you urinate. This turns your urine orange. You may stop taking it when your symptoms get better. But be sure to take all of your antibiotics, which treat the infection. · Drink extra water for the next day or two. This will help make the urine less concentrated and help wash out the bacteria causing the infection. (If you have kidney, heart, or liver disease and have to limit your fluids, talk with your doctor before you increase your fluid intake.)  · Avoid drinks that are carbonated or have caffeine. They can irritate the bladder. · Urinate often. Try to empty your bladder each time. · To relieve pain, take a hot bath or lay a heating pad (set on low) over your lower belly or genital area. Never go to sleep with a heating pad in place. To help prevent UTIs  · Drink plenty of fluids, enough so that your urine is light yellow or clear like water.  If you have kidney, heart, or liver disease and have to limit fluids, talk with your doctor before you increase the amount of fluids you drink. · Urinate when you have the urge. Do not hold your urine for a long time. Urinate before you go to sleep. · Keep your penis clean. Catheter care  If you have a drainage tube (catheter) in place, the following steps will help you care for it. · Always wash your hands before and after touching your catheter. · Check the area around the urethra for inflammation or signs of infection. Signs of infection include irritated, swollen, red, or tender skin, or pus around the catheter. · Clean the area around the catheter with soap and water two times a day. Dry with a clean towel afterward. · Do not apply powder or lotion to the skin around the catheter. To empty the urine collection bag   · Wash your hands with soap and water. · Without touching the drain spout, remove the spout from its sleeve at the bottom of the collection bag. Open the valve on the spout. · Let the urine flow out of the bag and into the toilet or a container. Do not let the tubing or drain spout touch anything. · After you empty the bag, clean the end of the drain spout with tissue and water. Close the valve and put the drain spout back into its sleeve at the bottom of the collection bag. · Wash your hands with soap and water. When should you call for help? Call your doctor now or seek immediate medical care if:  · Symptoms such as a fever, chills, nausea, or vomiting get worse or happen for the first time. · You have new pain in your back just below your rib cage. This is called flank pain. · There is new blood or pus in your urine. · You are not able to take or keep down your antibiotics. Watch closely for changes in your health, and be sure to contact your doctor if:  · You are not getting better after taking an antibiotic for 2 days. · Your symptoms go away but then come back. Where can you learn more?   Go to http://www.gray.com/  Enter Q371 in the search box to learn more about \"Urinary Tract Infections in Men: Care Instructions. \"  Current as of: August 22, 2019               Content Version: 12.5  © 7761-3290 [employer]. Care instructions adapted under license by [employer] (which disclaims liability or warranty for this information). If you have questions about a medical condition or this instruction, always ask your healthcare professional. [place] [address] any warranty or liability for your use of this information. Patient Education        Urinary Tract Infections in Men: Care Instructions  Your Care Instructions     A urinary tract infection, or UTI, is a general term for an infection anywhere between the kidneys and the tip of the penis. UTIs can also be a result of a prostate problem. Most cause pain or burning when you urinate. Most UTIs are caused by bacteria and can be cured with antibiotics. It is important to complete your treatment so that the infection does not get worse. Follow-up care is a key part of your treatment and safety. Be sure to make and go to all appointments, and call your doctor if you are having problems. It's also a good idea to know your test results and keep a list of the medicines you take. How can you care for yourself at home? · Take your antibiotics as prescribed. Do not stop taking them just because you feel better. You need to take the full course of antibiotics. · Take your medicines exactly as prescribed. Your doctor may have prescribed a medicine, such as phenazopyridine (Pyridium), to help relieve pain when you urinate. This turns your urine orange. You may stop taking it when your symptoms get better. But be sure to take all of your antibiotics, which treat the infection. · Drink extra water for the next day or two.  This will help make the urine less concentrated and help wash out the bacteria causing the infection. (If you have kidney, heart, or liver disease and have to limit your fluids, talk with your doctor before you increase your fluid intake.)  · Avoid drinks that are carbonated or have caffeine. They can irritate the bladder. · Urinate often. Try to empty your bladder each time. · To relieve pain, take a hot bath or lay a heating pad (set on low) over your lower belly or genital area. Never go to sleep with a heating pad in place. To help prevent UTIs  · Drink plenty of fluids, enough so that your urine is light yellow or clear like water. If you have kidney, heart, or liver disease and have to limit fluids, talk with your doctor before you increase the amount of fluids you drink. · Urinate when you have the urge. Do not hold your urine for a long time. Urinate before you go to sleep. · Keep your penis clean. Catheter care  If you have a drainage tube (catheter) in place, the following steps will help you care for it. · Always wash your hands before and after touching your catheter. · Check the area around the urethra for inflammation or signs of infection. Signs of infection include irritated, swollen, red, or tender skin, or pus around the catheter. · Clean the area around the catheter with soap and water two times a day. Dry with a clean towel afterward. · Do not apply powder or lotion to the skin around the catheter. To empty the urine collection bag   · Wash your hands with soap and water. · Without touching the drain spout, remove the spout from its sleeve at the bottom of the collection bag. Open the valve on the spout. · Let the urine flow out of the bag and into the toilet or a container. Do not let the tubing or drain spout touch anything. · After you empty the bag, clean the end of the drain spout with tissue and water. Close the valve and put the drain spout back into its sleeve at the bottom of the collection bag. · Wash your hands with soap and water.   When should you call for help? Call your doctor now or seek immediate medical care if:  · Symptoms such as a fever, chills, nausea, or vomiting get worse or happen for the first time. · You have new pain in your back just below your rib cage. This is called flank pain. · There is new blood or pus in your urine. · You are not able to take or keep down your antibiotics. Watch closely for changes in your health, and be sure to contact your doctor if:  · You are not getting better after taking an antibiotic for 2 days. · Your symptoms go away but then come back. Where can you learn more? Go to http://magda-tyra.info/  Enter Y103 in the search box to learn more about \"Urinary Tract Infections in Men: Care Instructions. \"  Current as of: August 22, 2019               Content Version: 12.5  © 4518-0885 [employer]. Care instructions adapted under license by "[employer] (which disclaims liability or warranty for this information). If you have questions about a medical condition or this instruction, always ask your healthcare professional. [address] any warranty or liability for your use of this information.

## 2021-01-25 ENCOUNTER — OFFICE VISIT (OUTPATIENT)
Dept: CARDIOLOGY CLINIC | Age: 86
End: 2021-01-25
Payer: COMMERCIAL

## 2021-01-25 VITALS
BODY MASS INDEX: 28.04 KG/M2 | HEART RATE: 71 BPM | DIASTOLIC BLOOD PRESSURE: 64 MMHG | SYSTOLIC BLOOD PRESSURE: 117 MMHG | WEIGHT: 207 LBS | TEMPERATURE: 97.5 F | OXYGEN SATURATION: 100 % | HEIGHT: 72 IN

## 2021-01-25 DIAGNOSIS — I50.9 ACUTE ON CHRONIC CONGESTIVE HEART FAILURE, UNSPECIFIED HEART FAILURE TYPE (HCC): Primary | ICD-10-CM

## 2021-01-25 DIAGNOSIS — E03.9 ACQUIRED HYPOTHYROIDISM: ICD-10-CM

## 2021-01-25 DIAGNOSIS — I48.92 ATRIAL FLUTTER, UNSPECIFIED TYPE (HCC): ICD-10-CM

## 2021-01-25 DIAGNOSIS — I45.2 RBBB (RIGHT BUNDLE BRANCH BLOCK WITH LEFT ANTERIOR FASCICULAR BLOCK): ICD-10-CM

## 2021-01-25 DIAGNOSIS — G47.9 SLEEP DISORDER: ICD-10-CM

## 2021-01-25 PROCEDURE — 99215 OFFICE O/P EST HI 40 MIN: CPT | Performed by: NURSE PRACTITIONER

## 2021-01-25 PROCEDURE — 93000 ELECTROCARDIOGRAM COMPLETE: CPT | Performed by: INTERNAL MEDICINE

## 2021-01-25 RX ORDER — TIMOLOL MALEATE 2.5 MG/ML
1 SOLUTION/ DROPS OPHTHALMIC 2 TIMES DAILY
COMMUNITY

## 2021-01-25 RX ORDER — TRAVOPROST OPHTHALMIC SOLUTION 0.04 MG/ML
1 SOLUTION OPHTHALMIC EVERY EVENING
COMMUNITY

## 2021-01-25 NOTE — PROGRESS NOTES
HISTORY OF PRESENT ILLNESS  Oseas Meadows is a 81 y.o. male. CHF  The history is provided by the medical records. This is a chronic problem. Associated symptoms include shortness of breath. Pertinent negatives include no chest pain and no headaches. Fatigue  The history is provided by the patient. This is a new problem. The current episode started more than 1 week ago (yrs). The problem occurs daily. The problem has not changed (1/17) since onset. Associated symptoms include shortness of breath. Pertinent negatives include no chest pain and no headaches. The symptoms are aggravated by walking (100-200 steps). The symptoms are relieved by rest. He has tried nothing for the symptoms. Leg Swelling  The history is provided by the patient. This is a new problem. The current episode started more than 1 week ago (2015). The problem occurs every several days. The problem has not changed since onset. Associated symptoms include shortness of breath. Pertinent negatives include no chest pain and no headaches. The symptoms are aggravated by standing. The symptoms are relieved by sleep. He has tried nothing for the symptoms. Shortness of Breath  The history is provided by the patient. This is a new problem. The problem occurs intermittently. The current episode started more than 1 week ago. The problem has not changed since onset. Pertinent negatives include no fever, no headaches, no cough, no wheezing, no PND, no orthopnea, no chest pain, no vomiting, no rash, no leg swelling and no claudication. The problem's precipitants include exercise (25-50 feet). Review of Systems   Constitutional: Positive for fatigue and malaise/fatigue. Negative for chills, fever and weight loss. HENT: Negative for nosebleeds. Eyes: Negative for discharge. Respiratory: Positive for shortness of breath. Negative for cough and wheezing.     Cardiovascular: Negative for chest pain, palpitations, orthopnea, claudication, leg swelling and PND.   Gastrointestinal: Negative for diarrhea, nausea and vomiting. Genitourinary: Negative for dysuria and hematuria. Musculoskeletal: Negative for joint pain. Skin: Negative for rash. Neurological: Positive for dizziness. Negative for seizures, loss of consciousness and headaches. Endo/Heme/Allergies: Negative for polydipsia. Does not bruise/bleed easily. Psychiatric/Behavioral: Negative for depression and substance abuse. The patient does not have insomnia. Allergies   Allergen Reactions    Seasonale [Levonorgestrel-Ethinyl Estrad] Sneezing     Seasonal allergy not with SEASONALE [LEVONORGESTREL-ETHINYL ESTRAD]       Past Medical History:   Diagnosis Date    Unspecified essential hypertension 2017       Family History   Problem Relation Age of Onset    Cancer Sister     Heart Attack Neg Hx     Stroke Neg Hx        Social History     Tobacco Use    Smoking status: Former Smoker     Quit date: 1982     Years since quittin.5    Smokeless tobacco: Never Used   Substance Use Topics    Alcohol use: No    Drug use: No        Current Outpatient Medications   Medication Sig    timolol (TIMOPTIC) 0.25 % ophthalmic solution Administer 1 Drop to both eyes two (2) times a day.  travoprost (TRAVATAN Z) 0.004 % ophthalmic solution Administer 1 Drop to both eyes every evening.  apixaban (ELIQUIS) 5 mg tablet Take 1 Tab by mouth two (2) times a day.  MILK THISTLE PO Take 250 mcg by mouth.  docusate sodium (STOOL SOFTENER PO) Take  by mouth.  hydroxychloroquine (PLAQUENIL) 200 mg tablet Take 200 mg by mouth two (2) times a day.  levothyroxine (SYNTHROID) 25 mcg tablet Take 25 mcg by mouth Daily (before breakfast).  alfuzosin SR (UROXATRAL) 10 mg SR tablet Take  by mouth daily.  biotin 1,000 mcg chew Take  by mouth.  zinc 50 mg tab tablet Take  by mouth daily.  ASCORBATE CALCIUM (RUDY-C PO) Take  by mouth.     cholecalciferol (VITAMIN D3) 1,000 unit cap Take by mouth daily.  co-enzyme Q-10 (CO Q-10) 100 mg capsule Take 100 mg by mouth daily. No current facility-administered medications for this visit. Past Surgical History:   Procedure Laterality Date    HX BACK SURGERY  1971       Visit Vitals  /64 (BP 1 Location: Left arm, BP Patient Position: Sitting)   Pulse 71   Temp 97.5 °F (36.4 °C) (Temporal)   Ht 6' (1.829 m)   Wt 93.9 kg (207 lb)   SpO2 100%   BMI 28.07 kg/m²       Diagnostic Studies:  I have reviewed the relevant tests done on the patient and show as follows  EKG tracings reviewed by me today. No flowsheet data found. 8/17 Nuclear Stress Test  Narrative   Conclusions :  1. No ischemic ST-T changes up to a heart rate of 99 or after Lexiscan infusion. 2.  Moderate to severely reduced functional capacity. 3.  No symptoms or arrhythmias with exercise or after Lexiscan infusion  4. Appropriate heart rate and blood pressure response. 5.  Perfusion images report to follow   Impression   Conclusion:   1. Normal scan. 2. Evidence of a fixed inferobasal defect is most likely due to diaphragmatic attenuation in this patient. 3. Normal wall motion and preserved ejection fraction. 4. Low risk scan. 8/17 Echocardiogram  SUMMARY:  Left ventricle: Systolic function was normal. Ejection fraction was  estimated to be 55 %. There were no regional wall motion abnormalities. There was mild concentric hypertrophy. Doppler parameters were consistent  with abnormal left ventricular relaxation (grade 1 diastolic dysfunction). Mitral valve: There was trivial regurgitation. Tricuspid valve: There was mild regurgitation. Mr. Fernanda Palomino has a reminder for a \"due or due soon\" health maintenance. I have asked that he contact his primary care provider for follow-up on this health maintenance. Physical Exam   Constitutional: He is oriented to person, place, and time. He appears well-developed and well-nourished. No distress.    HENT: Head: Normocephalic and atraumatic. Mouth/Throat: Normal dentition. Eyes: Right eye exhibits no discharge. Left eye exhibits no discharge. No scleral icterus. Neck: Neck supple. No JVD present. Carotid bruit is not present. No thyromegaly present. Cardiovascular: Normal rate, S1 normal, S2 normal, normal heart sounds and intact distal pulses. Exam reveals no gallop and no friction rub. No murmur heard. Pulmonary/Chest: Effort normal and breath sounds normal. He has no wheezes. He has no rales. Abdominal: Soft. He exhibits no mass. There is no abdominal tenderness. Musculoskeletal: Normal range of motion. General: Edema: slight puffy b/l. Lymphadenopathy:        Right cervical: No superficial cervical adenopathy present. Left cervical: No superficial cervical adenopathy present. Neurological: He is alert and oriented to person, place, and time. Skin: Skin is warm and dry. No rash noted. Psychiatric: He has a normal mood and affect. His behavior is normal.   Vitals reviewed. ASSESSMENT and PLAN    Atrial Fibrillation CHADSVASC2 Score Stroke Risk:   81 y.o. > 76        +4    male Male     +0   CHF HX: No    + 0   HTN HX: No    + 0   Stroke/TIA/Thromboembolism No    +0   Vascular Disease HX: No    + 0   Diabetes Mellitus No    + 0   CHADSVASC 2 Score 2      Annual Stroke Risk 2.2%- moderate-high      1/2021 - Recent EKG at PCP atrial flutter with controlled ventricular rate. Mr. Christi Allan c/o increased shortness of breath, and Isolated episode of dizziness. Will evaluate with Echo, and for ischemia with stress test.  Due to shortness of breath and dizziness will evaluate for jcarlos / tachy with holter monitor. Request recent labs with TSH from PCP. Begin Eliquis 5 mg/ BID. Risks and benefits of anticoagulation explained to patient and spouse. Follow up post testing      Diagnoses and all orders for this visit:    1.  Acute on chronic congestive heart failure, unspecified heart failure type (HCC)  -     AMB POC EKG ROUTINE W/ 12 LEADS, INTER & REP    2. RBBB (right bundle branch block with left anterior fascicular block)    3. Sleep disorder    4. Acquired hypothyroidism    5. Atrial flutter, unspecified type (HCC)  -     apixaban (ELIQUIS) 5 mg tablet; Take 1 Tab by mouth two (2) times a day. -     ECHO ADULT COMPLETE; Future  -     NUCLEAR CARDIAC STRESS TEST; Future  -     VT XTRNL ECG CONTINUOUS RHYTHM W/I&R UP TO 50 HRS        Pertinent laboratory and test data reviewed and discussed with patient. See patient instructions also for other medical advice given    Medications Discontinued During This Encounter   Medication Reason    citalopram (CELEXA) 20 mg tablet DISCONTINUED BY ANOTHER CLINICIAN       Follow-up and Dispositions    · Return in 6 weeks (on 3/8/2021), or if symptoms worsen or fail to improve, for Obtain labs with lipids from PCP, Post testing. Routing History        I have independently evaluated and examined the patient. Patient with newly diagnosed atrial flutter and mild CHF. Start anticoagulation to reduce the stroke risk. Heart rate is controlled. Continue present medications. Check echo for cardiac structure and function and a stress test for ischemia which will be needed to decide on antiarrhythmic agents. Discussed with patient and the wife the plan and they agree. All relevant labs and testing data are reviewed. Care plan discussed and updated after review.   Raymon Rodriguez MD

## 2021-01-25 NOTE — PATIENT INSTRUCTIONS
Atrial Flutter: Care Instructions Your Care Instructions Atrial flutter is a type of heartbeat problem (arrhythmia) that usually causes a fast heart rate. In atrial flutter, a problem with the heart's electrical system causes the two upper parts of the heart (the right atrium and the left atrium) to flutter, or beat very fast. Atrial flutter might be diagnosed using an an electrocardiogram (EKG). An EKG translates the heart's electrical activity into line tracings on paper. Treating atrial flutter is important for several reasons. The change in heartbeat can cause blood clots. The clots can travel from your heart to your brain and cause a stroke. A fast heartbeat can make you feel lightheaded, dizzy, and weak. And over time, it can also increase your risk for heart failure. Atrial flutter is often the result of another heart condition, such as coronary artery disease or some other heart rhythm problems. Making changes to improve your heart health will help you stay healthy and active. Your doctor may prescribe medicines to help slow down your heartbeat. You may also take medicine to help prevent a stroke. In some cases, a procedure called catheter ablation is done to stop atrial flutter. Follow-up care is a key part of your treatment and safety. Be sure to make and go to all appointments, and call your doctor if you are having problems. It's also a good idea to know your test results and keep a list of the medicines you take. How can you care for yourself at home? Medicines 
  · Take your medicines exactly as prescribed. Call your doctor if you think you are having a problem with your medicine. You will get more details on the specific medicines your doctor prescribes.  
  · If your doctor has given you a blood thinner to prevent a stroke, be sure you get instructions about how to take your medicine safely. Blood thinners can cause serious bleeding problems.   · Do not take any vitamins, over-the-counter drugs, or herbal products without talking to your doctor first.  
Lifestyle changes 
  · Do not smoke. Smoking can increase your chance of a stroke and heart attack. If you need help quitting, talk to your doctor about stop-smoking programs and medicines. These can increase your chances of quitting for good.  
  · Eat a heart-healthy diet.  
  · Stay at a healthy weight. Lose weight if you need to.  
  · Limit alcohol to 2 drinks a day for men and 1 drink a day for women. Too much alcohol can cause health problems.  
  · Avoid colds and flu. Get a pneumococcal vaccine shot. If you have had one before, ask your doctor whether you need another dose. Get a flu shot every year. If you must be around people with colds or flu, wash your hands often. Activity 
  · Talk to your doctor about what type and level of exercise is safe for you. Start light exercise if your doctor says it is okay. Walking is a good choice. Try for at least 30 minutes on most days of the week. You also may want to swim, bike, or do other activities.  
  · When you exercise, watch for signs that your heart is working too hard. You are pushing too hard if you can't talk while you exercise. If you become short of breath or dizzy or have chest pain, sit down and rest right away. When should you call for help? Call 911 anytime you think you may need emergency care. For example, call if: 
  · You have symptoms of a stroke. These may include: 
? Sudden numbness, tingling, weakness, or loss of movement in your face, arm, or leg, especially on only one side of your body. ? Sudden vision changes. ? Sudden trouble speaking. ? Sudden confusion or trouble understanding simple statements. ? Sudden problems with walking or balance. ? A sudden, severe headache that is different from past headaches.  
  · You passed out (lost consciousness). Call your doctor now or seek immediate medical care if:   · You have new or increased shortness of breath.  
  · You feel dizzy or lightheaded, or you feel like you may faint.  
  · Your heart rate becomes irregular.  
  · You can feel your heart flutter in your chest or skip heartbeats. Tell your doctor if these symptoms are new or worse. Watch closely for changes in your health, and be sure to contact your doctor if you have any problems. Where can you learn more? Go to http://www.gray.com/ Enter A963 in the search box to learn more about \"Atrial Flutter: Care Instructions. \" Current as of: December 16, 2019               Content Version: 12.6 © 1730-1420 [employer]. Care instructions adapted under license by [employer] (which disclaims liability or warranty for this information). If you have questions about a medical condition or this instruction, always ask your healthcare professional. [address] any warranty or liability for your use of this information.

## 2021-01-25 NOTE — PROGRESS NOTES
1. Have you been to the ER, urgent care clinic since your last visit? Hospitalized since your last visit?     no    2. Have you seen or consulted any other health care providers outside of the [address] [place] [address] since your last visit? Include any pap smears or colon screening. Yes When: x 1 wk ago with PCP      3. Since your last visit, have you had any of the following symptoms? shortness of breath. 4.  Have you had any blood work, X-rays or cardiac testing? Yes When:  at PCP office      Requested: YES    5. Where do you normally have your labs drawn? PCP office    6. Do you need any refills today?    no

## 2021-02-26 ENCOUNTER — OFFICE VISIT (OUTPATIENT)
Dept: CARDIOLOGY CLINIC | Age: 86
End: 2021-02-26
Payer: COMMERCIAL

## 2021-02-26 VITALS
HEIGHT: 72 IN | BODY MASS INDEX: 28.17 KG/M2 | TEMPERATURE: 97.2 F | HEART RATE: 69 BPM | WEIGHT: 208 LBS | DIASTOLIC BLOOD PRESSURE: 56 MMHG | SYSTOLIC BLOOD PRESSURE: 100 MMHG | OXYGEN SATURATION: 100 %

## 2021-02-26 DIAGNOSIS — I45.2 RBBB (RIGHT BUNDLE BRANCH BLOCK WITH LEFT ANTERIOR FASCICULAR BLOCK): ICD-10-CM

## 2021-02-26 DIAGNOSIS — E03.9 ACQUIRED HYPOTHYROIDISM: ICD-10-CM

## 2021-02-26 DIAGNOSIS — I50.32 CHRONIC DIASTOLIC CONGESTIVE HEART FAILURE (HCC): ICD-10-CM

## 2021-02-26 DIAGNOSIS — I48.92 ATRIAL FLUTTER, UNSPECIFIED TYPE (HCC): Primary | ICD-10-CM

## 2021-02-26 PROCEDURE — 99214 OFFICE O/P EST MOD 30 MIN: CPT | Performed by: INTERNAL MEDICINE

## 2021-02-26 RX ORDER — PROPAFENONE HYDROCHLORIDE 150 MG/1
150 TABLET, FILM COATED ORAL 3 TIMES DAILY
Qty: 90 TAB | Refills: 1 | Status: SHIPPED | OUTPATIENT
Start: 2021-02-26 | End: 2021-03-31

## 2021-02-26 NOTE — PROGRESS NOTES
HISTORY OF PRESENT ILLNESS  Crys Castro is a 81 y.o. male. CHF  The history is provided by the medical records. This is a chronic problem. Associated symptoms include shortness of breath. Pertinent negatives include no chest pain and no headaches. Fatigue  The history is provided by the patient. This is a new problem. The current episode started more than 1 week ago (yrs). The problem occurs daily. The problem has not changed (1/17) since onset. Associated symptoms include shortness of breath. Pertinent negatives include no chest pain and no headaches. The symptoms are aggravated by walking (100-200 steps). The symptoms are relieved by rest. He has tried nothing for the symptoms. Leg Swelling  The history is provided by the patient. This is a new problem. The current episode started more than 1 week ago (2015). The problem occurs every several days. The problem has been resolved. Associated symptoms include shortness of breath. Pertinent negatives include no chest pain and no headaches. The symptoms are aggravated by standing. The symptoms are relieved by sleep. He has tried nothing for the symptoms. Shortness of Breath  The history is provided by the patient. This is a new problem. The problem occurs intermittently. The current episode started more than 1 week ago. The problem has not changed since onset. Pertinent negatives include no fever, no headaches, no cough, no wheezing, no PND, no orthopnea, no chest pain, no vomiting, no rash, no leg swelling and no claudication. The problem's precipitants include exercise (25-50 feet). Review of Systems   Constitutional: Positive for fatigue and malaise/fatigue. Negative for chills, fever and weight loss. HENT: Negative for nosebleeds. Eyes: Negative for discharge. Respiratory: Positive for shortness of breath. Negative for cough and wheezing. Cardiovascular: Negative for chest pain, palpitations, orthopnea, claudication, leg swelling and PND. Gastrointestinal: Negative for diarrhea, nausea and vomiting. Genitourinary: Negative for dysuria and hematuria. Musculoskeletal: Negative for joint pain. Skin: Negative for rash. Neurological: Positive for dizziness. Negative for seizures, loss of consciousness and headaches. Endo/Heme/Allergies: Negative for polydipsia. Does not bruise/bleed easily. Psychiatric/Behavioral: Negative for depression and substance abuse. The patient does not have insomnia. Allergies   Allergen Reactions    Seasonale [Levonorgestrel-Ethinyl Estrad] Sneezing     Seasonal allergy not with SEASONALE [LEVONORGESTREL-ETHINYL ESTRAD]       Past Medical History:   Diagnosis Date    Unspecified essential hypertension 2017       Family History   Problem Relation Age of Onset    Cancer Sister     Heart Attack Neg Hx     Stroke Neg Hx        Social History     Tobacco Use    Smoking status: Former Smoker     Quit date: 1982     Years since quittin.6    Smokeless tobacco: Never Used   Substance Use Topics    Alcohol use: No    Drug use: No        Current Outpatient Medications   Medication Sig    timolol (TIMOPTIC) 0.25 % ophthalmic solution Administer 1 Drop to both eyes two (2) times a day.  travoprost (TRAVATAN Z) 0.004 % ophthalmic solution Administer 1 Drop to both eyes every evening.  apixaban (ELIQUIS) 5 mg tablet Take 1 Tab by mouth two (2) times a day.  MILK THISTLE PO Take 250 mcg by mouth.  docusate sodium (STOOL SOFTENER PO) Take  by mouth.  hydroxychloroquine (PLAQUENIL) 200 mg tablet Take 200 mg by mouth two (2) times a day.  levothyroxine (SYNTHROID) 25 mcg tablet Take 25 mcg by mouth Daily (before breakfast).  alfuzosin SR (UROXATRAL) 10 mg SR tablet Take  by mouth daily.  biotin 1,000 mcg chew Take  by mouth.  zinc 50 mg tab tablet Take  by mouth daily.  ASCORBATE CALCIUM (RUDY-C PO) Take  by mouth.     cholecalciferol (VITAMIN D3) 1,000 unit cap Take  by mouth daily.  co-enzyme Q-10 (CO Q-10) 100 mg capsule Take 100 mg by mouth daily. No current facility-administered medications for this visit. Past Surgical History:   Procedure Laterality Date    HX BACK SURGERY  1971       Visit Vitals  BP (!) 100/56 (BP 1 Location: Left upper arm, BP Patient Position: Sitting, BP Cuff Size: Adult)   Pulse 69   Temp 97.2 °F (36.2 °C) (Temporal)   Ht 6' (1.829 m)   Wt 94.3 kg (208 lb)   SpO2 100%   BMI 28.21 kg/m²       Diagnostic Studies:  I have reviewed the relevant tests done on the patient and show as follows  EKG tracings reviewed by me today. No flowsheet data found. 8/17 Nuclear Stress Test  Narrative   Conclusions :  1. No ischemic ST-T changes up to a heart rate of 99 or after Lexiscan infusion. 2.  Moderate to severely reduced functional capacity. 3.  No symptoms or arrhythmias with exercise or after Lexiscan infusion  4. Appropriate heart rate and blood pressure response. 5.  Perfusion images report to follow   Impression   Conclusion:   1. Normal scan. 2. Evidence of a fixed inferobasal defect is most likely due to diaphragmatic attenuation in this patient. 3. Normal wall motion and preserved ejection fraction. 4. Low risk scan. 8/17 Echocardiogram  SUMMARY:  Left ventricle: Systolic function was normal. Ejection fraction was  estimated to be 55 %. There were no regional wall motion abnormalities. There was mild concentric hypertrophy. Doppler parameters were consistent  with abnormal left ventricular relaxation (grade 1 diastolic dysfunction). Mitral valve: There was trivial regurgitation. Tricuspid valve: There was mild regurgitation. 2/21 echo   interpretation Summary    · LV: Estimated LVEF is 55 - 60%. Normal cavity size and systolic function (ejection fraction normal). Mild concentric hypertrophy. Wall motion: normal. Age-appropriate left ventricular diastolic function.   · LA: Left Atrium volume index is 30.57 mL/m2.  · RV: Normal right ventricular size and function. · TV: Mild tricuspid valve regurgitation is present. · PA: Pulmonary arterial systolic pressure is 34 mmHg. · MV: Mild mitral annular calcification. Comparison Study Information    Prior Study    When compared to the previous study from 8/18/17, no significant changes are seen     02/08/21   NUCLEAR CARDIAC STRESS TEST 02/08/2021 2/8/2021    Narrative · Baseline ECG: Atrial fibrillation, atrial flutter, Nonspecific T wave   changes. · Negative stress test.  · Gated SPECT: Left ventricular function post-stress was normal.   Calculated ejection fraction is 76%. There is no evidence of transient   ischemic dilation (TID). The TID ratio is 1.5. · Myocardial perfusion imaging supports a low risk stress test.  · Left ventricular perfusion is normal.        Signed by: Robson Modi MD     Mr. Joann Colorado has a reminder for a \"due or due soon\" health maintenance. I have asked that he contact his primary care provider for follow-up on this health maintenance. Physical Exam   Constitutional: He is oriented to person, place, and time. He appears well-developed and well-nourished. No distress. HENT:   Head: Normocephalic and atraumatic. Mouth/Throat: Normal dentition. Eyes: Right eye exhibits no discharge. Left eye exhibits no discharge. No scleral icterus. Neck: Neck supple. No JVD present. Carotid bruit is not present. No thyromegaly present. Cardiovascular: Normal rate, S1 normal, S2 normal, normal heart sounds and intact distal pulses. Exam reveals no gallop and no friction rub. No murmur heard. Pulmonary/Chest: Effort normal and breath sounds normal. He has no wheezes. He has no rales. Abdominal: Soft. He exhibits no mass. There is no abdominal tenderness. Musculoskeletal: Normal range of motion. General: Edema: slight puffy b/l. Lymphadenopathy:        Right cervical: No superficial cervical adenopathy present.        Left cervical: No superficial cervical adenopathy present. Neurological: He is alert and oriented to person, place, and time. Skin: Skin is warm and dry. No rash noted. Psychiatric: He has a normal mood and affect. His behavior is normal.   Vitals reviewed. ASSESSMENT and PLAN    Atrial Fibrillation CHADSVASC2 Score Stroke Risk:   81 y.o. > 76        +4    male Male     +0   CHF HX: No    + 0   HTN HX: No    + 0   Stroke/TIA/Thromboembolism No    +0   Vascular Disease HX: No    + 0   Diabetes Mellitus No    + 0   CHADSVASC 2 Score 2      Annual Stroke Risk 2.2%- moderate-high      1/2021 - Recent EKG at PCP atrial flutter with controlled ventricular rate. Mr. Maria Alejandra Beach c/o increased shortness of breath, and Isolated episode of dizziness. Will evaluate with Echo, and for ischemia with stress test.  Due to shortness of breath and dizziness will evaluate for jcarlos / tachy with holter monitor. Request recent labs with TSH from PCP. Begin Eliquis 5 mg/ BID. Risks and benefits of anticoagulation explained to patient and spouse. Follow up post testing      Diagnoses and all orders for this visit:    1. Atrial flutter, unspecified type (HCC)  -     propafenone (RYTHMOL) 150 mg tablet; Take 1 Tab by mouth three (3) times daily.  -     AMB POC EKG ROUTINE W/ 12 LEADS, INTER & REP    2. RBBB (right bundle branch block with left anterior fascicular block)    3. Acquired hypothyroidism    4. Chronic diastolic congestive heart failure ([address]        Pertinent laboratory and test data reviewed and discussed with patient. See patient instructions also for other medical advice given    Medications Discontinued During This Encounter   Medication Reason    hydroxychloroquine (PLAQUENIL) 200 mg tablet Other       Follow-up and Dispositions    · Return in about 2 weeks (around 3/12/2021), or if symptoms worsen or fail to improve, for with ekg, an extra EKG in 1 week.        2/21 continues to feel fatigued and dyspneic on mild exertion. Echo has shown normal ejection fraction. Stress test is without any ischemia. Start propafenone and plan for cardioversion. Procedure discussed with patient. Continue Eliquis. An EKG in 1 week and then follow-up in about 2 weeks with another EKG.

## 2021-02-26 NOTE — PATIENT INSTRUCTIONS
Medications Discontinued During This Encounter Medication Reason  hydroxychloroquine (PLAQUENIL) 200 mg tablet Other Learning About Cardioversion What is cardioversion? Cardioversion is a treatment that helps your heart return to a normal rhythm. It treats problems like atrial fibrillation. It is also sometimes used in emergencies. It can correct a fast heartbeat that causes low blood pressure, chest pain, or heart failure. Cardioversion can be done by using an electric current or medicines. What are the types of cardioversion? There are two types: · The electrical type uses an electric current. The current enters your body through patches on your chest or back. · The chemical type uses medicines. The medicine is usually put into your arm through a tube called an IV. Your doctor may ask you to take medicines before the treatment. These help prevent blood clots. Electrical cardioversion The electrical procedure is done in a hospital. You will get medicine to help you relax and control the pain. Your doctor will put patches on your chest or back. The patches send an electric current to your heart. This resets your heart rhythm. The electrical part takes about 5 minutes. But you will probably be in the hospital for 1 to 2 hours. You will need to recover from the effects of the sedative medicine. Chemical cardioversion The chemical procedure is most often done in a hospital. In most cases, the medicine is put into your arm through a tube called an IV. But you may get medicines to take by mouth. You may feel a quick sting or pinch when the IV starts. The procedure usually takes about 4 to 8 hours. What can you expect after cardioversion? · You can usually go home the same day. You will need someone to drive you home. · Your doctor may have you take medicines daily. These help your heart beat normally and prevent blood clots. · After electrical cardioversion, you may have redness where the patches were. This looks and feels like a sunburn. · Abnormal heart rhythms sometimes come back after cardioversion. Follow-up care is a key part of your treatment and safety. Be sure to make and go to all appointments, and call your doctor if you are having problems. It's also a good idea to know your test results and keep a list of the medicines you take. Where can you learn more? Go to http://www.gray.com/ Enter R418 in the search box to learn more about \"Learning About Cardioversion. \" Current as of: December 16, 2019               Content Version: 12.6 © 5567-8638 [employer]. Care instructions adapted under license by [employer] (which disclaims liability or warranty for this information). If you have questions about a medical condition or this instruction, always ask your healthcare professional. [address] any warranty or liability for your use of this information.

## 2021-02-26 NOTE — PROGRESS NOTES
1. Have you been to the ER, urgent care clinic since your last visit? Hospitalized since your last visit? No    2. Have you seen or consulted any other health care providers outside of the [address] [place] [address] since your last visit? Include any pap smears or colon screening. No     3. Since your last visit, have you had any of the following symptoms? shortness of breath and dizziness    4. Have you had any blood work, X-rays or cardiac testing? No    5. Where do you normally have your labs drawn? PCP    6. Do you need any refills today?    NO

## 2021-03-05 ENCOUNTER — TELEPHONE (OUTPATIENT)
Dept: CARDIOLOGY CLINIC | Age: 86
End: 2021-03-05

## 2021-03-05 ENCOUNTER — CLINICAL SUPPORT (OUTPATIENT)
Dept: CARDIOLOGY CLINIC | Age: 86
End: 2021-03-05
Payer: COMMERCIAL

## 2021-03-05 DIAGNOSIS — I48.92 ATRIAL FLUTTER, UNSPECIFIED TYPE (HCC): Primary | ICD-10-CM

## 2021-03-05 PROCEDURE — 93000 ELECTROCARDIOGRAM COMPLETE: CPT | Performed by: INTERNAL MEDICINE

## 2021-03-05 NOTE — TELEPHONE ENCOUNTER
Let patient know per Dr. Anton Olivas to continue propafenone and will see in office on Monday. Patient verbalized understanding and had no further questions at this time.

## 2021-03-08 ENCOUNTER — OFFICE VISIT (OUTPATIENT)
Dept: CARDIOLOGY CLINIC | Age: 86
End: 2021-03-08
Payer: COMMERCIAL

## 2021-03-08 VITALS
OXYGEN SATURATION: 100 % | TEMPERATURE: 97.3 F | HEART RATE: 71 BPM | SYSTOLIC BLOOD PRESSURE: 102 MMHG | BODY MASS INDEX: 28.09 KG/M2 | WEIGHT: 207.4 LBS | HEIGHT: 72 IN | DIASTOLIC BLOOD PRESSURE: 63 MMHG

## 2021-03-08 DIAGNOSIS — I48.92 ATRIAL FLUTTER, UNSPECIFIED TYPE (HCC): Primary | ICD-10-CM

## 2021-03-08 DIAGNOSIS — E03.9 ACQUIRED HYPOTHYROIDISM: ICD-10-CM

## 2021-03-08 DIAGNOSIS — I45.2 RBBB (RIGHT BUNDLE BRANCH BLOCK WITH LEFT ANTERIOR FASCICULAR BLOCK): ICD-10-CM

## 2021-03-08 DIAGNOSIS — I50.32 CHRONIC DIASTOLIC CONGESTIVE HEART FAILURE (HCC): ICD-10-CM

## 2021-03-08 PROCEDURE — 93000 ELECTROCARDIOGRAM COMPLETE: CPT | Performed by: INTERNAL MEDICINE

## 2021-03-08 PROCEDURE — 99215 OFFICE O/P EST HI 40 MIN: CPT | Performed by: INTERNAL MEDICINE

## 2021-03-08 NOTE — PROGRESS NOTES
HISTORY OF PRESENT ILLNESS  Alejandra Wolf is a 81 y.o. male. CHF  The history is provided by the medical records. This is a chronic problem. Associated symptoms include shortness of breath. Pertinent negatives include no chest pain and no headaches. Fatigue  The history is provided by the patient. This is a new problem. The current episode started more than 1 week ago (yrs). The problem occurs daily. The problem has not changed (1/17) since onset. Associated symptoms include shortness of breath. Pertinent negatives include no chest pain and no headaches. The symptoms are aggravated by walking (100-200 steps). The symptoms are relieved by rest. He has tried nothing for the symptoms. Shortness of Breath  The history is provided by the patient. This is a new problem. The problem occurs intermittently. The current episode started more than 1 week ago. The problem has not changed since onset. Pertinent negatives include no fever, no headaches, no cough, no wheezing, no PND, no orthopnea, no chest pain, no vomiting, no rash, no leg swelling and no claudication. The problem's precipitants include exercise (25-50 feet). Review of Systems   Constitutional: Positive for fatigue and malaise/fatigue. Negative for chills, fever and weight loss. HENT: Negative for nosebleeds. Eyes: Negative for discharge. Respiratory: Positive for shortness of breath. Negative for cough and wheezing. Cardiovascular: Negative for chest pain, palpitations, orthopnea, claudication, leg swelling and PND. Gastrointestinal: Negative for diarrhea, nausea and vomiting. Genitourinary: Negative for dysuria and hematuria. Musculoskeletal: Negative for joint pain. Skin: Negative for rash. Neurological: Positive for dizziness. Negative for seizures, loss of consciousness and headaches. Endo/Heme/Allergies: Negative for polydipsia. Does not bruise/bleed easily.    Psychiatric/Behavioral: Negative for depression and substance abuse. The patient does not have insomnia. Allergies   Allergen Reactions    Seasonale [Levonorgestrel-Ethinyl Estrad] Sneezing     Seasonal allergy not with SEASONALE [LEVONORGESTREL-ETHINYL ESTRAD]       Past Medical History:   Diagnosis Date    Unspecified essential hypertension 2017       Family History   Problem Relation Age of Onset    Cancer Sister     Heart Attack Neg Hx     Stroke Neg Hx        Social History     Tobacco Use    Smoking status: Former Smoker     Quit date: 1982     Years since quittin.6    Smokeless tobacco: Never Used   Substance Use Topics    Alcohol use: No    Drug use: No        Current Outpatient Medications   Medication Sig    propafenone (RYTHMOL) 150 mg tablet Take 1 Tab by mouth three (3) times daily.  timolol (TIMOPTIC) 0.25 % ophthalmic solution Administer 1 Drop to both eyes two (2) times a day.  travoprost (TRAVATAN Z) 0.004 % ophthalmic solution Administer 1 Drop to both eyes every evening.  apixaban (ELIQUIS) 5 mg tablet Take 1 Tab by mouth two (2) times a day.  MILK THISTLE PO Take 250 mcg by mouth.  docusate sodium (STOOL SOFTENER PO) Take  by mouth.  levothyroxine (SYNTHROID) 25 mcg tablet Take 25 mcg by mouth Daily (before breakfast).  alfuzosin SR (UROXATRAL) 10 mg SR tablet Take  by mouth daily.  biotin 1,000 mcg chew Take  by mouth.  zinc 50 mg tab tablet Take  by mouth daily.  ASCORBATE CALCIUM (RUDY-C PO) Take  by mouth.  cholecalciferol (VITAMIN D3) 1,000 unit cap Take  by mouth daily.  co-enzyme Q-10 (CO Q-10) 100 mg capsule Take 100 mg by mouth daily. No current facility-administered medications for this visit.          Past Surgical History:   Procedure Laterality Date    HX BACK SURGERY  1971       Visit Vitals  /63 (BP 1 Location: Left arm, BP Patient Position: Sitting, BP Cuff Size: Large adult)   Pulse 71   Temp 97.3 °F (36.3 °C) (Temporal)   Ht 6' (1.829 m)   Wt 94.1 kg (207 lb 6.4 oz)   SpO2 100%   BMI 28.13 kg/m²       Diagnostic Studies:  I have reviewed the relevant tests done on the patient and show as follows  EKG tracings reviewed by me today. No flowsheet data found. 8/17 Nuclear Stress Test  Narrative   Conclusions :  1. No ischemic ST-T changes up to a heart rate of 99 or after Lexiscan infusion. 2.  Moderate to severely reduced functional capacity. 3.  No symptoms or arrhythmias with exercise or after Lexiscan infusion  4. Appropriate heart rate and blood pressure response. 5.  Perfusion images report to follow   Impression   Conclusion:   1. Normal scan. 2. Evidence of a fixed inferobasal defect is most likely due to diaphragmatic attenuation in this patient. 3. Normal wall motion and preserved ejection fraction. 4. Low risk scan. 8/17 Echocardiogram  SUMMARY:  Left ventricle: Systolic function was normal. Ejection fraction was  estimated to be 55 %. There were no regional wall motion abnormalities. There was mild concentric hypertrophy. Doppler parameters were consistent  with abnormal left ventricular relaxation (grade 1 diastolic dysfunction). Mitral valve: There was trivial regurgitation. Tricuspid valve: There was mild regurgitation. 2/21 echo   interpretation Summary    · LV: Estimated LVEF is 55 - 60%. Normal cavity size and systolic function (ejection fraction normal). Mild concentric hypertrophy. Wall motion: normal. Age-appropriate left ventricular diastolic function. · LA: Left Atrium volume index is 30.57 mL/m2. · RV: Normal right ventricular size and function. · TV: Mild tricuspid valve regurgitation is present. · PA: Pulmonary arterial systolic pressure is 34 mmHg. · MV: Mild mitral annular calcification.       Comparison Study Information    Prior Study    When compared to the previous study from 8/18/17, no significant changes are seen     02/08/21   NUCLEAR CARDIAC STRESS TEST 02/08/2021 2/8/2021    Narrative · Baseline ECG: Atrial fibrillation, atrial flutter, Nonspecific T wave   changes. · Negative stress test.  · Gated SPECT: Left ventricular function post-stress was normal.   Calculated ejection fraction is 76%. There is no evidence of transient   ischemic dilation (TID). The TID ratio is 1.5. · Myocardial perfusion imaging supports a low risk stress test.  · Left ventricular perfusion is normal.        Signed by: Liane Morales MD     Mr. Madeline Saenz has a reminder for a \"due or due soon\" health maintenance. I have asked that he contact his primary care provider for follow-up on this health maintenance. Physical Exam   Constitutional: He is oriented to person, place, and time. He appears well-developed and well-nourished. No distress. HENT:   Head: Normocephalic and atraumatic. Mouth/Throat: Normal dentition. Eyes: Right eye exhibits no discharge. Left eye exhibits no discharge. No scleral icterus. Neck: Neck supple. No JVD present. Carotid bruit is not present. No thyromegaly present. Cardiovascular: Normal rate, S1 normal, S2 normal, normal heart sounds and intact distal pulses. Exam reveals no gallop and no friction rub. No murmur heard. Pulmonary/Chest: Effort normal and breath sounds normal. He has no wheezes. He has no rales. Abdominal: Soft. He exhibits no mass. There is no abdominal tenderness. Musculoskeletal: Normal range of motion. General: Edema: slight puffy b/l. Lymphadenopathy:        Right cervical: No superficial cervical adenopathy present. Left cervical: No superficial cervical adenopathy present. Neurological: He is alert and oriented to person, place, and time. Skin: Skin is warm and dry. No rash noted. Psychiatric: He has a normal mood and affect. His behavior is normal.   Vitals reviewed.       ASSESSMENT and PLAN    Atrial Fibrillation CHADSVASC2 Score Stroke Risk:   81 y.o. > 76        +4    male Male     +0   CHF HX: No    + 0   HTN HX: No    + 0 Stroke/TIA/Thromboembolism No    +0   Vascular Disease HX: No    + 0   Diabetes Mellitus No    + 0   CHADSVASC 2 Score 2      Annual Stroke Risk 2.2%- moderate-high      1/2021 - Recent EKG at PCP atrial flutter with controlled ventricular rate. Mr. Michael Dahl c/o increased shortness of breath, and Isolated episode of dizziness. Will evaluate with Echo, and for ischemia with stress test.  Due to shortness of breath and dizziness will evaluate for jcarlos / tachy with holter monitor. Request recent labs with TSH from PCP. Begin Eliquis 5 mg/ BID. Risks and benefits of anticoagulation explained to patient and spouse. Follow up post testing    2/21 continues to feel fatigued and dyspneic on mild exertion. Echo has shown normal ejection fraction. Stress test is without any ischemia. Start propafenone and plan for cardioversion. Procedure discussed with patient. Continue Eliquis. An EKG in 1 week and then follow-up in about 2 weeks with another EKG. Diagnoses and all orders for this visit:    1. Atrial flutter, unspecified type ([address]  -     AMB POC EKG ROUTINE W/ 12 LEADS, INTER & REP    2. RBBB (right bundle branch block with left anterior fascicular block)    3. Acquired hypothyroidism    4. Chronic diastolic congestive heart failure ([address]        Pertinent laboratory and test data reviewed and discussed with patient. See patient instructions also for other medical advice given    There are no discontinued medications. Follow-up and Dispositions    · Return in about 1 month (around 4/8/2021), or if symptoms worsen or fail to improve, for post procedure. 3/8/2021 atrial flutter persists. Patient has tolerated propafenone and continues to take anticoagulation regularly. His symptoms of fatigue and shortness of breath on exertion persist.  No significant edema noted. Discussed with patient and family the cardioversion and will be scheduling it for next week.   Discussed the procedure again and answered all the questions.

## 2021-03-08 NOTE — PROGRESS NOTES
1. Have you been to the ER, urgent care clinic since your last visit? Hospitalized since your last visit? No    2. Have you seen or consulted any other health care providers outside of the [address] [place] [address] since your last visit? Include any pap smears or colon screening. No     3. Since your last visit, have you had any of the following symptoms? No    4. Have you had any blood work, X-rays or cardiac testing? No    5. Where do you normally have your labs drawn? [facility]    6. Do you need any refills today?    No

## 2021-03-08 NOTE — H&P (VIEW-ONLY)
HISTORY OF PRESENT ILLNESS Paul Heck is a 81 y.o. male. CHF The history is provided by the medical records. This is a chronic problem. Associated symptoms include shortness of breath. Pertinent negatives include no chest pain and no headaches. Fatigue The history is provided by the patient. This is a new problem. The current episode started more than 1 week ago (yrs). The problem occurs daily. The problem has not changed (1/17) since onset. Associated symptoms include shortness of breath. Pertinent negatives include no chest pain and no headaches. The symptoms are aggravated by walking (100-200 steps). The symptoms are relieved by rest. He has tried nothing for the symptoms. Shortness of Breath The history is provided by the patient. This is a new problem. The problem occurs intermittently. The current episode started more than 1 week ago. The problem has not changed since onset. Pertinent negatives include no fever, no headaches, no cough, no wheezing, no PND, no orthopnea, no chest pain, no vomiting, no rash, no leg swelling and no claudication. The problem's precipitants include exercise (25-50 feet). Review of Systems Constitutional: Positive for fatigue and malaise/fatigue. Negative for chills, fever and weight loss. HENT: Negative for nosebleeds. Eyes: Negative for discharge. Respiratory: Positive for shortness of breath. Negative for cough and wheezing. Cardiovascular: Negative for chest pain, palpitations, orthopnea, claudication, leg swelling and PND. Gastrointestinal: Negative for diarrhea, nausea and vomiting. Genitourinary: Negative for dysuria and hematuria. Musculoskeletal: Negative for joint pain. Skin: Negative for rash. Neurological: Positive for dizziness. Negative for seizures, loss of consciousness and headaches. Endo/Heme/Allergies: Negative for polydipsia. Does not bruise/bleed easily.   
Psychiatric/Behavioral: Negative for depression and substance abuse. The patient does not have insomnia. Allergies Allergen Reactions  Seasonale [Levonorgestrel-Ethinyl Estrad] Sneezing Seasonal allergy not with SEASONALE [LEVONORGESTREL-ETHINYL ESTRAD] Past Medical History:  
Diagnosis Date  Unspecified essential hypertension 2017 Family History Problem Relation Age of Onset  Cancer Sister  Heart Attack Neg Hx  Stroke Neg Hx Social History Tobacco Use  Smoking status: Former Smoker Quit date: 1982 Years since quittin.6  Smokeless tobacco: Never Used Substance Use Topics  Alcohol use: No  
 Drug use: No  
  
 
Current Outpatient Medications Medication Sig  propafenone (RYTHMOL) 150 mg tablet Take 1 Tab by mouth three (3) times daily.  timolol (TIMOPTIC) 0.25 % ophthalmic solution Administer 1 Drop to both eyes two (2) times a day.  travoprost (TRAVATAN Z) 0.004 % ophthalmic solution Administer 1 Drop to both eyes every evening.  apixaban (ELIQUIS) 5 mg tablet Take 1 Tab by mouth two (2) times a day.  MILK THISTLE PO Take 250 mcg by mouth.  docusate sodium (STOOL SOFTENER PO) Take  by mouth.  levothyroxine (SYNTHROID) 25 mcg tablet Take 25 mcg by mouth Daily (before breakfast).  alfuzosin SR (UROXATRAL) 10 mg SR tablet Take  by mouth daily.  biotin 1,000 mcg chew Take  by mouth.  zinc 50 mg tab tablet Take  by mouth daily.  ASCORBATE CALCIUM (RUDY-C PO) Take  by mouth.  cholecalciferol (VITAMIN D3) 1,000 unit cap Take  by mouth daily.  co-enzyme Q-10 (CO Q-10) 100 mg capsule Take 100 mg by mouth daily. No current facility-administered medications for this visit. Past Surgical History:  
Procedure Laterality Date 244 [facility] Visit Vitals /63 (BP 1 Location: Left arm, BP Patient Position: Sitting, BP Cuff Size: Large adult) Pulse 71 Temp 97.3 °F (36.3 °C) (Temporal) Ht 6' (1.829 m) Wt 94.1 kg (207 lb 6.4 oz)  
SpO2 100% BMI 28.13 kg/m² Diagnostic Studies: 
I have reviewed the relevant tests done on the patient and show as follows EKG tracings reviewed by me today. No flowsheet data found. 8/17 Nuclear Stress Test 
Narrative Conclusions : 
1. No ischemic ST-T changes up to a heart rate of 99 or after Lexiscan infusion. 2.  Moderate to severely reduced functional capacity. 3.  No symptoms or arrhythmias with exercise or after Lexiscan infusion 4. Appropriate heart rate and blood pressure response. 5.  Perfusion images report to follow Impression Conclusion: 1. Normal scan. 2. Evidence of a fixed inferobasal defect is most likely due to diaphragmatic attenuation in this patient. 3. Normal wall motion and preserved ejection fraction. 4. Low risk scan. 8/17 Echocardiogram 
SUMMARY: 
Left ventricle: Systolic function was normal. Ejection fraction was 
estimated to be 55 %. There were no regional wall motion abnormalities. There was mild concentric hypertrophy. Doppler parameters were consistent 
with abnormal left ventricular relaxation (grade 1 diastolic dysfunction). Mitral valve: There was trivial regurgitation. Tricuspid valve: There was mild regurgitation. 2/21 echo  
interpretation Summary · LV: Estimated LVEF is 55 - 60%. Normal cavity size and systolic function (ejection fraction normal). Mild concentric hypertrophy. Wall motion: normal. Age-appropriate left ventricular diastolic function. · LA: Left Atrium volume index is 30.57 mL/m2. · RV: Normal right ventricular size and function. · TV: Mild tricuspid valve regurgitation is present. · PA: Pulmonary arterial systolic pressure is 34 mmHg. · MV: Mild mitral annular calcification. Comparison Study Information Prior Study When compared to the previous study from 8/18/17, no significant changes are seen 02/08/21 NUCLEAR CARDIAC STRESS TEST 02/08/2021 2/8/2021  Narrative · Baseline ECG: Atrial fibrillation, atrial flutter, Nonspecific T wave  
changes. · Negative stress test. 
· Gated SPECT: Left ventricular function post-stress was normal.  
Calculated ejection fraction is 76%. There is no evidence of transient  
ischemic dilation (TID). The TID ratio is 1.5. · Myocardial perfusion imaging supports a low risk stress test. 
· Left ventricular perfusion is normal. 
   
  Signed by: Иван Paul MD  
 
Mr. Lorna Hayward has a reminder for a \"due or due soon\" health maintenance. I have asked that he contact his primary care provider for follow-up on this health maintenance. Physical Exam  
Constitutional: He is oriented to person, place, and time. He appears well-developed and well-nourished. No distress. HENT:  
Head: Normocephalic and atraumatic. Mouth/Throat: Normal dentition. Eyes: Right eye exhibits no discharge. Left eye exhibits no discharge. No scleral icterus. Neck: Neck supple. No JVD present. Carotid bruit is not present. No thyromegaly present. Cardiovascular: Normal rate, S1 normal, S2 normal, normal heart sounds and intact distal pulses. Exam reveals no gallop and no friction rub. No murmur heard. Pulmonary/Chest: Effort normal and breath sounds normal. He has no wheezes. He has no rales. Abdominal: Soft. He exhibits no mass. There is no abdominal tenderness. Musculoskeletal: Normal range of motion. General: Edema: slight puffy b/l. Lymphadenopathy:  
     Right cervical: No superficial cervical adenopathy present. Left cervical: No superficial cervical adenopathy present. Neurological: He is alert and oriented to person, place, and time. Skin: Skin is warm and dry. No rash noted. Psychiatric: He has a normal mood and affect. His behavior is normal.  
Vitals reviewed. ASSESSMENT and PLAN Atrial Fibrillation CHADSVASC2 Score Stroke Risk:  
81 y.o. > 76        +4   
male Male     +0  
CHF HX: No    + 0 HTN HX: No    + 0 Stroke/TIA/Thromboembolism No    +0 Vascular Disease HX: No    + 0 Diabetes Mellitus No    + 0 CHADSVASC 2 Score 2      Annual Stroke Risk 2.2%- moderate-high 1/2021 - Recent EKG at PCP atrial flutter with controlled ventricular rate. Mr. Harman Pablo c/o increased shortness of breath, and Isolated episode of dizziness. Will evaluate with Echo, and for ischemia with stress test.  Due to shortness of breath and dizziness will evaluate for jcarlos / tachy with holter monitor. Request recent labs with TSH from PCP. Begin Eliquis 5 mg/ BID. Risks and benefits of anticoagulation explained to patient and spouse. Follow up post testing 2/21 continues to feel fatigued and dyspneic on mild exertion. Echo has shown normal ejection fraction. Stress test is without any ischemia. Start propafenone and plan for cardioversion. Procedure discussed with patient. Continue Eliquis. An EKG in 1 week and then follow-up in about 2 weeks with another EKG. Diagnoses and all orders for this visit: 1. Atrial flutter, unspecified type ([address] -     AMB POC EKG ROUTINE W/ 12 LEADS, INTER & REP 2. RBBB (right bundle branch block with left anterior fascicular block) 3. Acquired hypothyroidism 4. Chronic diastolic congestive heart failure ([address] Pertinent laboratory and test data reviewed and discussed with patient. See patient instructions also for other medical advice given There are no discontinued medications. Follow-up and Dispositions · Return in about 1 month (around 4/8/2021), or if symptoms worsen or fail to improve, for post procedure. 3/8/2021 atrial flutter persists. Patient has tolerated propafenone and continues to take anticoagulation regularly. His symptoms of fatigue and shortness of breath on exertion persist.  No significant edema noted. Discussed with patient and family the cardioversion and will be scheduling it for next week.  
Discussed the procedure again and answered all the questions.

## 2021-03-08 NOTE — PATIENT INSTRUCTIONS
There are no discontinued medications. Learning About Cardioversion What is cardioversion? Cardioversion is a treatment that helps your heart return to a normal rhythm. It treats problems like atrial fibrillation. It is also sometimes used in emergencies. It can correct a fast heartbeat that causes low blood pressure, chest pain, or heart failure. Cardioversion can be done by using an electric current or medicines. What are the types of cardioversion? There are two types: · The electrical type uses an electric current. The current enters your body through patches on your chest or back. · The chemical type uses medicines. The medicine is usually put into your arm through a tube called an IV. Your doctor may ask you to take medicines before the treatment. These help prevent blood clots. Electrical cardioversion The electrical procedure is done in a hospital. You will get medicine to help you relax and control the pain. Your doctor will put patches on your chest or back. The patches send an electric current to your heart. This resets your heart rhythm. The electrical part takes about 5 minutes. But you will probably be in the hospital for 1 to 2 hours. You will need to recover from the effects of the sedative medicine. Chemical cardioversion The chemical procedure is most often done in a hospital. In most cases, the medicine is put into your arm through a tube called an IV. But you may get medicines to take by mouth. You may feel a quick sting or pinch when the IV starts. The procedure usually takes about 4 to 8 hours. What can you expect after cardioversion? · You can usually go home the same day. You will need someone to drive you home. · Your doctor may have you take medicines daily. These help your heart beat normally and prevent blood clots. · After electrical cardioversion, you may have redness where the patches were. This looks and feels like a sunburn. · Abnormal heart rhythms sometimes come back after cardioversion. Follow-up care is a key part of your treatment and safety. Be sure to make and go to all appointments, and call your doctor if you are having problems. It's also a good idea to know your test results and keep a list of the medicines you take. Where can you learn more? Go to http://www.gray.com/ Enter W482 in the search box to learn more about \"Learning About Cardioversion. \" Current as of: December 16, 2019               Content Version: 12.6 © 6377-7947 [employer]. Care instructions adapted under license by [employer] (which disclaims liability or warranty for this information). If you have questions about a medical condition or this instruction, always ask your healthcare professional. [address] any warranty or liability for your use of this information.

## 2021-03-23 ENCOUNTER — ANESTHESIA EVENT (OUTPATIENT)
Dept: CARDIAC CATH/INVASIVE PROCEDURES | Age: 86
DRG: 292 | End: 2021-03-23
Payer: MEDICARE

## 2021-03-23 ENCOUNTER — APPOINTMENT (OUTPATIENT)
Dept: CT IMAGING | Age: 86
DRG: 292 | End: 2021-03-23
Attending: EMERGENCY MEDICINE
Payer: MEDICARE

## 2021-03-23 ENCOUNTER — HOSPITAL ENCOUNTER (INPATIENT)
Age: 86
LOS: 8 days | Discharge: NURSING, REHAB OR LONG-TERM CARE | DRG: 292 | End: 2021-03-31
Attending: EMERGENCY MEDICINE | Admitting: HOSPITALIST
Payer: MEDICARE

## 2021-03-23 ENCOUNTER — HOSPITAL ENCOUNTER (OUTPATIENT)
Age: 86
Setting detail: OUTPATIENT SURGERY
Discharge: HOME | DRG: 292 | End: 2021-03-23
Attending: INTERNAL MEDICINE | Admitting: INTERNAL MEDICINE
Payer: MEDICARE

## 2021-03-23 ENCOUNTER — APPOINTMENT (OUTPATIENT)
Dept: GENERAL RADIOLOGY | Age: 86
DRG: 292 | End: 2021-03-23
Attending: EMERGENCY MEDICINE
Payer: MEDICARE

## 2021-03-23 ENCOUNTER — ANESTHESIA (OUTPATIENT)
Dept: CARDIAC CATH/INVASIVE PROCEDURES | Age: 86
DRG: 292 | End: 2021-03-23
Payer: MEDICARE

## 2021-03-23 VITALS
HEART RATE: 74 BPM | SYSTOLIC BLOOD PRESSURE: 121 MMHG | HEIGHT: 72 IN | BODY MASS INDEX: 28.04 KG/M2 | DIASTOLIC BLOOD PRESSURE: 74 MMHG | TEMPERATURE: 96.8 F | RESPIRATION RATE: 12 BRPM | WEIGHT: 207 LBS | OXYGEN SATURATION: 99 %

## 2021-03-23 DIAGNOSIS — I95.1 ORTHOSTATIC HYPOTENSION: ICD-10-CM

## 2021-03-23 DIAGNOSIS — I48.92 ATRIAL FLUTTER (HCC): ICD-10-CM

## 2021-03-23 DIAGNOSIS — Z71.89 GOALS OF CARE, COUNSELING/DISCUSSION: ICD-10-CM

## 2021-03-23 DIAGNOSIS — I50.33 ACUTE ON CHRONIC DIASTOLIC CONGESTIVE HEART FAILURE (HCC): ICD-10-CM

## 2021-03-23 DIAGNOSIS — R50.9 FEVER, UNSPECIFIED FEVER CAUSE: Primary | ICD-10-CM

## 2021-03-23 DIAGNOSIS — R53.81 DEBILITY: ICD-10-CM

## 2021-03-23 PROBLEM — I50.32 CHRONIC DIASTOLIC CONGESTIVE HEART FAILURE (HCC): Status: ACTIVE | Noted: 2017-07-19

## 2021-03-23 LAB
ALBUMIN SERPL-MCNC: 3.1 G/DL (ref 3.4–5)
ALBUMIN/GLOB SERPL: 0.9 {RATIO} (ref 0.8–1.7)
ALP SERPL-CCNC: 98 U/L (ref 45–117)
ALT SERPL-CCNC: 41 U/L (ref 16–61)
ANION GAP SERPL CALC-SCNC: 6 MMOL/L (ref 3–18)
ANION GAP SERPL CALC-SCNC: 6 MMOL/L (ref 3–18)
AST SERPL-CCNC: 36 U/L (ref 10–38)
ATRIAL RATE: 79 BPM
BASOPHILS # BLD: 0 K/UL (ref 0–0.1)
BASOPHILS NFR BLD: 0 % (ref 0–2)
BILIRUB SERPL-MCNC: 0.7 MG/DL (ref 0.2–1)
BUN SERPL-MCNC: 24 MG/DL (ref 7–18)
BUN SERPL-MCNC: 27 MG/DL (ref 7–18)
BUN/CREAT SERPL: 23 (ref 12–20)
BUN/CREAT SERPL: 27 (ref 12–20)
CALCIUM SERPL-MCNC: 8.2 MG/DL (ref 8.5–10.1)
CALCIUM SERPL-MCNC: 8.5 MG/DL (ref 8.5–10.1)
CALCULATED P AXIS, ECG09: 58 DEGREES
CALCULATED R AXIS, ECG10: -59 DEGREES
CALCULATED T AXIS, ECG11: 28 DEGREES
CHLORIDE SERPL-SCNC: 100 MMOL/L (ref 100–111)
CHLORIDE SERPL-SCNC: 96 MMOL/L (ref 100–111)
CK MB CFR SERPL CALC: 1 % (ref 0–4)
CK MB SERPL-MCNC: 1.6 NG/ML (ref 5–25)
CK SERPL-CCNC: 158 U/L (ref 39–308)
CO2 SERPL-SCNC: 24 MMOL/L (ref 21–32)
CO2 SERPL-SCNC: 26 MMOL/L (ref 21–32)
COVID-19 RAPID TEST, COVR: NOT DETECTED
CREAT SERPL-MCNC: 0.99 MG/DL (ref 0.6–1.3)
CREAT SERPL-MCNC: 1.05 MG/DL (ref 0.6–1.3)
DIAGNOSIS, 93000: NORMAL
DIFFERENTIAL METHOD BLD: ABNORMAL
EOSINOPHIL # BLD: 0.3 K/UL (ref 0–0.4)
EOSINOPHIL NFR BLD: 5 % (ref 0–5)
ERYTHROCYTE [DISTWIDTH] IN BLOOD BY AUTOMATED COUNT: 12.6 % (ref 11.6–14.5)
ERYTHROCYTE [DISTWIDTH] IN BLOOD BY AUTOMATED COUNT: 12.8 % (ref 11.6–14.5)
GLOBULIN SER CALC-MCNC: 3.3 G/DL (ref 2–4)
GLUCOSE SERPL-MCNC: 106 MG/DL (ref 74–99)
GLUCOSE SERPL-MCNC: 116 MG/DL (ref 74–99)
HCT VFR BLD AUTO: 29.1 % (ref 36–48)
HCT VFR BLD AUTO: 30.8 % (ref 36–48)
HGB BLD-MCNC: 10.1 G/DL (ref 13–16)
HGB BLD-MCNC: 10.6 G/DL (ref 13–16)
INR PPP: 1.6 (ref 0.8–1.2)
INR PPP: 1.8 (ref 0.8–1.2)
LACTATE BLD-SCNC: 0.84 MMOL/L (ref 0.4–2)
LYMPHOCYTES # BLD: 0.4 K/UL (ref 0.9–3.6)
LYMPHOCYTES NFR BLD: 7 % (ref 21–52)
MCH RBC QN AUTO: 30.9 PG (ref 24–34)
MCH RBC QN AUTO: 31 PG (ref 24–34)
MCHC RBC AUTO-ENTMCNC: 34.4 G/DL (ref 31–37)
MCHC RBC AUTO-ENTMCNC: 34.7 G/DL (ref 31–37)
MCV RBC AUTO: 89.3 FL (ref 74–97)
MCV RBC AUTO: 89.8 FL (ref 74–97)
MONOCYTES # BLD: 0.4 K/UL (ref 0.05–1.2)
MONOCYTES NFR BLD: 8 % (ref 3–10)
NEUTS SEG # BLD: 4.5 K/UL (ref 1.8–8)
NEUTS SEG NFR BLD: 80 % (ref 40–73)
P-R INTERVAL, ECG05: 266 MS
PLATELET # BLD AUTO: 149 K/UL (ref 135–420)
PLATELET # BLD AUTO: 169 K/UL (ref 135–420)
PMV BLD AUTO: 9.7 FL (ref 9.2–11.8)
PMV BLD AUTO: 9.7 FL (ref 9.2–11.8)
POTASSIUM SERPL-SCNC: 4 MMOL/L (ref 3.5–5.5)
POTASSIUM SERPL-SCNC: 4.4 MMOL/L (ref 3.5–5.5)
PROT SERPL-MCNC: 6.4 G/DL (ref 6.4–8.2)
PROTHROMBIN TIME: 18.3 SEC (ref 11.5–15.2)
PROTHROMBIN TIME: 20.2 SEC (ref 11.5–15.2)
Q-T INTERVAL, ECG07: 426 MS
QRS DURATION, ECG06: 158 MS
QTC CALCULATION (BEZET), ECG08: 488 MS
RBC # BLD AUTO: 3.26 M/UL (ref 4.7–5.5)
RBC # BLD AUTO: 3.43 M/UL (ref 4.7–5.5)
SODIUM SERPL-SCNC: 128 MMOL/L (ref 136–145)
SODIUM SERPL-SCNC: 130 MMOL/L (ref 136–145)
SOURCE, COVRS: NORMAL
TROPONIN I SERPL-MCNC: 0.03 NG/ML (ref 0–0.04)
VENTRICULAR RATE, ECG03: 79 BPM
WBC # BLD AUTO: 5.5 K/UL (ref 4.6–13.2)
WBC # BLD AUTO: 5.6 K/UL (ref 4.6–13.2)

## 2021-03-23 PROCEDURE — 92960 CARDIOVERSION ELECTRIC EXT: CPT | Performed by: INTERNAL MEDICINE

## 2021-03-23 PROCEDURE — 74011250636 HC RX REV CODE- 250/636: Performed by: ANESTHESIOLOGY

## 2021-03-23 PROCEDURE — 99223 1ST HOSP IP/OBS HIGH 75: CPT | Performed by: HOSPITALIST

## 2021-03-23 PROCEDURE — 74011250637 HC RX REV CODE- 250/637: Performed by: INTERNAL MEDICINE

## 2021-03-23 PROCEDURE — 74011000258 HC RX REV CODE- 258: Performed by: EMERGENCY MEDICINE

## 2021-03-23 PROCEDURE — 74011250637 HC RX REV CODE- 250/637: Performed by: HOSPITALIST

## 2021-03-23 PROCEDURE — 01922 ANES N-INVAS IMG/RADJ THER: CPT | Performed by: ANESTHESIOLOGY

## 2021-03-23 PROCEDURE — 87635 SARS-COV-2 COVID-19 AMP PRB: CPT

## 2021-03-23 PROCEDURE — 74011250636 HC RX REV CODE- 250/636: Performed by: HOSPITALIST

## 2021-03-23 PROCEDURE — 99100 ANES PT EXTEME AGE<1 YR&>70: CPT | Performed by: ANESTHESIOLOGY

## 2021-03-23 PROCEDURE — 70450 CT HEAD/BRAIN W/O DYE: CPT

## 2021-03-23 PROCEDURE — 82553 CREATINE MB FRACTION: CPT

## 2021-03-23 PROCEDURE — 85025 COMPLETE CBC W/AUTO DIFF WBC: CPT

## 2021-03-23 PROCEDURE — 87040 BLOOD CULTURE FOR BACTERIA: CPT

## 2021-03-23 PROCEDURE — 80053 COMPREHEN METABOLIC PANEL: CPT

## 2021-03-23 PROCEDURE — 83605 ASSAY OF LACTIC ACID: CPT

## 2021-03-23 PROCEDURE — 76060000031 HC ANESTHESIA FIRST 0.5 HR: Performed by: INTERNAL MEDICINE

## 2021-03-23 PROCEDURE — 85027 COMPLETE CBC AUTOMATED: CPT

## 2021-03-23 PROCEDURE — 74011250636 HC RX REV CODE- 250/636: Performed by: EMERGENCY MEDICINE

## 2021-03-23 PROCEDURE — 74011250636 HC RX REV CODE- 250/636: Performed by: INTERNAL MEDICINE

## 2021-03-23 PROCEDURE — 85610 PROTHROMBIN TIME: CPT

## 2021-03-23 PROCEDURE — 65660000000 HC RM CCU STEPDOWN

## 2021-03-23 PROCEDURE — 71045 X-RAY EXAM CHEST 1 VIEW: CPT

## 2021-03-23 PROCEDURE — 74011000258 HC RX REV CODE- 258: Performed by: HOSPITALIST

## 2021-03-23 PROCEDURE — 74011000250 HC RX REV CODE- 250: Performed by: ANESTHESIOLOGY

## 2021-03-23 PROCEDURE — 93005 ELECTROCARDIOGRAM TRACING: CPT

## 2021-03-23 PROCEDURE — 96374 THER/PROPH/DIAG INJ IV PUSH: CPT

## 2021-03-23 PROCEDURE — 99285 EMERGENCY DEPT VISIT HI MDM: CPT

## 2021-03-23 RX ORDER — LATANOPROST 50 UG/ML
1 SOLUTION/ DROPS OPHTHALMIC
Status: DISCONTINUED | OUTPATIENT
Start: 2021-03-23 | End: 2021-03-31 | Stop reason: HOSPADM

## 2021-03-23 RX ORDER — SODIUM CHLORIDE 0.9 % (FLUSH) 0.9 %
5-40 SYRINGE (ML) INJECTION AS NEEDED
Status: DISCONTINUED | OUTPATIENT
Start: 2021-03-23 | End: 2021-03-31 | Stop reason: HOSPADM

## 2021-03-23 RX ORDER — SODIUM CHLORIDE 0.9 % (FLUSH) 0.9 %
5-40 SYRINGE (ML) INJECTION EVERY 8 HOURS
Status: DISCONTINUED | OUTPATIENT
Start: 2021-03-23 | End: 2021-03-31 | Stop reason: HOSPADM

## 2021-03-23 RX ORDER — ACETAMINOPHEN 325 MG/1
650 TABLET ORAL
Status: DISCONTINUED | OUTPATIENT
Start: 2021-03-23 | End: 2021-03-31 | Stop reason: HOSPADM

## 2021-03-23 RX ORDER — ETOMIDATE 2 MG/ML
INJECTION INTRAVENOUS AS NEEDED
Status: DISCONTINUED | OUTPATIENT
Start: 2021-03-23 | End: 2021-03-23 | Stop reason: HOSPADM

## 2021-03-23 RX ORDER — SODIUM CHLORIDE 9 MG/ML
75 INJECTION, SOLUTION INTRAVENOUS CONTINUOUS
Status: DISCONTINUED | OUTPATIENT
Start: 2021-03-23 | End: 2021-03-24

## 2021-03-23 RX ORDER — VANCOMYCIN 1.75 GRAM/500 ML IN 0.9 % SODIUM CHLORIDE INTRAVENOUS
1750 ONCE
Status: COMPLETED | OUTPATIENT
Start: 2021-03-23 | End: 2021-03-24

## 2021-03-23 RX ORDER — PROPOFOL 10 MG/ML
INJECTION, EMULSION INTRAVENOUS AS NEEDED
Status: DISCONTINUED | OUTPATIENT
Start: 2021-03-23 | End: 2021-03-23 | Stop reason: HOSPADM

## 2021-03-23 RX ORDER — VANCOMYCIN HYDROCHLORIDE
1250
Status: DISCONTINUED | OUTPATIENT
Start: 2021-03-24 | End: 2021-03-24

## 2021-03-23 RX ORDER — SODIUM CHLORIDE 9 MG/ML
100 INJECTION, SOLUTION INTRAVENOUS CONTINUOUS
Status: DISCONTINUED | OUTPATIENT
Start: 2021-03-23 | End: 2021-03-23 | Stop reason: HOSPADM

## 2021-03-23 RX ORDER — ACETAMINOPHEN 650 MG/1
650 SUPPOSITORY RECTAL
Status: DISCONTINUED | OUTPATIENT
Start: 2021-03-23 | End: 2021-03-31 | Stop reason: HOSPADM

## 2021-03-23 RX ORDER — LIDOCAINE HYDROCHLORIDE 20 MG/ML
INJECTION, SOLUTION EPIDURAL; INFILTRATION; INTRACAUDAL; PERINEURAL AS NEEDED
Status: DISCONTINUED | OUTPATIENT
Start: 2021-03-23 | End: 2021-03-23 | Stop reason: HOSPADM

## 2021-03-23 RX ORDER — PROPAFENONE HYDROCHLORIDE 150 MG/1
150 TABLET, FILM COATED ORAL 3 TIMES DAILY
Status: DISCONTINUED | OUTPATIENT
Start: 2021-03-23 | End: 2021-03-29

## 2021-03-23 RX ORDER — POLYETHYLENE GLYCOL 3350 17 G/17G
17 POWDER, FOR SOLUTION ORAL DAILY PRN
Status: DISCONTINUED | OUTPATIENT
Start: 2021-03-23 | End: 2021-03-31 | Stop reason: HOSPADM

## 2021-03-23 RX ORDER — MELATONIN
1000 DAILY
Status: DISCONTINUED | OUTPATIENT
Start: 2021-03-24 | End: 2021-03-28

## 2021-03-23 RX ORDER — TIMOLOL MALEATE 2.5 MG/ML
1 SOLUTION/ DROPS OPHTHALMIC 2 TIMES DAILY
Status: DISCONTINUED | OUTPATIENT
Start: 2021-03-23 | End: 2021-03-31 | Stop reason: HOSPADM

## 2021-03-23 RX ORDER — SODIUM CHLORIDE 9 MG/ML
1000 INJECTION, SOLUTION INTRAVENOUS ONCE
Status: COMPLETED | OUTPATIENT
Start: 2021-03-23 | End: 2021-03-23

## 2021-03-23 RX ORDER — SODIUM CHLORIDE 9 MG/ML
INJECTION, SOLUTION INTRAVENOUS
Status: DISCONTINUED | OUTPATIENT
Start: 2021-03-23 | End: 2021-03-23 | Stop reason: HOSPADM

## 2021-03-23 RX ORDER — ONDANSETRON 2 MG/ML
4 INJECTION INTRAMUSCULAR; INTRAVENOUS
Status: DISCONTINUED | OUTPATIENT
Start: 2021-03-23 | End: 2021-03-31 | Stop reason: HOSPADM

## 2021-03-23 RX ORDER — LEVOTHYROXINE SODIUM 25 UG/1
25 TABLET ORAL
Status: DISCONTINUED | OUTPATIENT
Start: 2021-03-24 | End: 2021-03-31 | Stop reason: HOSPADM

## 2021-03-23 RX ADMIN — SODIUM CHLORIDE 1000 ML: 900 INJECTION, SOLUTION INTRAVENOUS at 15:41

## 2021-03-23 RX ADMIN — LIDOCAINE HYDROCHLORIDE 20 MG: 20 INJECTION, SOLUTION EPIDURAL; INFILTRATION; INTRACAUDAL; PERINEURAL at 09:54

## 2021-03-23 RX ADMIN — SODIUM CHLORIDE 1000 ML: 900 INJECTION, SOLUTION INTRAVENOUS at 21:52

## 2021-03-23 RX ADMIN — SODIUM CHLORIDE 1000 ML: 900 INJECTION, SOLUTION INTRAVENOUS at 10:24

## 2021-03-23 RX ADMIN — PROPOFOL 50 MG: 10 INJECTION, EMULSION INTRAVENOUS at 09:54

## 2021-03-23 RX ADMIN — VANCOMYCIN HYDROCHLORIDE 1750 MG: 10 INJECTION, POWDER, LYOPHILIZED, FOR SOLUTION INTRAVENOUS at 19:06

## 2021-03-23 RX ADMIN — SODIUM CHLORIDE: 9 INJECTION, SOLUTION INTRAVENOUS at 09:39

## 2021-03-23 RX ADMIN — PIPERACILLIN AND TAZOBACTAM 3.38 G: 3; .375 INJECTION, POWDER, FOR SOLUTION INTRAVENOUS at 15:41

## 2021-03-23 RX ADMIN — ETOMIDATE 10 MG: 2 INJECTION INTRAVENOUS at 09:54

## 2021-03-23 RX ADMIN — APIXABAN 5 MG: 5 TABLET, FILM COATED ORAL at 15:41

## 2021-03-23 RX ADMIN — PROPAFENONE HYDROCHLORIDE 150 MG: 150 TABLET, FILM COATED ORAL at 20:34

## 2021-03-23 RX ADMIN — PIPERACILLIN AND TAZOBACTAM 3.38 G: 3; .375 INJECTION, POWDER, FOR SOLUTION INTRAVENOUS at 21:52

## 2021-03-23 RX ADMIN — ACETAMINOPHEN 650 MG: 325 TABLET ORAL at 21:52

## 2021-03-23 NOTE — ED PROVIDER NOTES
EMERGENCY DEPARTMENT HISTORY AND PHYSICAL EXAM  This was created with voice recognition software and transcription errors may be present. 3:36 PM  Date: 3/23/2021  Patient Name: Nola Saavedra    History of Presenting Illness     Chief Complaint:    History Provided By:     HPI: Nola Saavedra is a 81 y.o. male past medical history of hypertension A. fib on Eliquis who presents for hypotension. Patient presents from Cath Lab where he had a cardioversion this morning he was noted to be persistently hypotensive on standing although he had no complaint he states that been going on all week. Per his family he has been having very low blood pressure all week he denies any fevers or chills or cough no abdominal pain no dysuria no chest pain or shortness of breath. PCP: Jackson Rodriguez MD      Past History     Past Medical History:  Past Medical History:   Diagnosis Date    Unspecified essential hypertension 2017       Past Surgical History:  Past Surgical History:   Procedure Laterality Date    HX BACK SURGERY  1971       Family History:  Family History   Problem Relation Age of Onset    Cancer Sister     Heart Attack Neg Hx     Stroke Neg Hx        Social History:  Social History     Tobacco Use    Smoking status: Former Smoker     Quit date: 1982     Years since quittin.7    Smokeless tobacco: Never Used   Substance Use Topics    Alcohol use: No    Drug use: No       Allergies: Allergies   Allergen Reactions    Seasonale [Levonorgestrel-Ethinyl Estrad] Sneezing     Seasonal allergy not with SEASONALE [LEVONORGESTREL-ETHINYL ESTRAD]       Review of Systems     Review of Systems   All other systems reviewed and are negative. 10 point review of systems otherwise negative unless noted in HPI. Physical Exam       Physical Exam  Constitutional:       Appearance: He is well-developed. HENT:      Head: Normocephalic and atraumatic.    Eyes:      Pupils: Pupils are equal, round, and reactive to light. Neck:      Musculoskeletal: Normal range of motion and neck supple. Cardiovascular:      Rate and Rhythm: Normal rate and regular rhythm. Heart sounds: Normal heart sounds. No murmur. No friction rub. Pulmonary:      Effort: Pulmonary effort is normal. No respiratory distress. Breath sounds: Normal breath sounds. No wheezing. Abdominal:      General: There is no distension. Palpations: Abdomen is soft. Tenderness: There is no abdominal tenderness. There is no guarding or rebound. Musculoskeletal: Normal range of motion. Skin:     General: Skin is warm and dry. Neurological:      Mental Status: He is alert and oriented to person, place, and time. Psychiatric:         Behavior: Behavior normal.         Thought Content: Thought content normal.         Diagnostic Study Results     Vital Signs   Visit Vitals  /83   Pulse (!) 111   Temp (!) 100.6 °F (38.1 °C)   Resp 22   Ht 6' 0.01\" (1.829 m)   Wt 93.9 kg (207 lb)   SpO2 96%   BMI 28.07 kg/m²    EKG: EKG shows sinus at 109 with normal axis normal intervals there is no ST elevation or depression no hypertrophy QRS of 142  from prior except rate  Labs: CBC is unremarkable chemistry unremarkable  Imaging: IMPRESSION     No acute cardiopulmonary findings. Medical Decision Making     ED Course: Progress Notes, Reevaluation, and Consults:      Provider Notes (Medical Decision Making):   72-year-old gentleman who presents hypotensive status post cardioversion. Overall etiology is unclear but he is febrile to 100.6 will consider with the evaluation. Will likely need admission/started IV fluids and antibiotics. D/w son/ pt over past week; he can hardley get around and is weak ; prior was able ot get aroudn with a walker. Has fallen a few times in past few days. I did discuss with Dr. Radha Cifuentes will admit to telemetry service. Patient's will be observed in the hospital for evaluation for fever and hypotension. Dr. Rand Weir to follow a head CT and urinalysis I appreciate his assistance but I will be  the provider of record for this patient       Diagnosis     Clinical Impression: No diagnosis found. Disposition:    Patient's Medications   Start Taking    No medications on file   Continue Taking    ALFUZOSIN SR (UROXATRAL) 10 MG SR TABLET    Take  by mouth daily. APIXABAN (ELIQUIS) 5 MG TABLET    Take 1 Tab by mouth two (2) times a day. ASCORBATE CALCIUM (RUDY-C PO)    Take  by mouth. BIOTIN 1,000 MCG CHEW    Take  by mouth. CHOLECALCIFEROL (VITAMIN D3) 1,000 UNIT CAP    Take  by mouth daily. CO-ENZYME Q-10 (CO Q-10) 100 MG CAPSULE    Take 100 mg by mouth daily. DOCUSATE SODIUM (STOOL SOFTENER PO)    Take  by mouth. LEVOTHYROXINE (SYNTHROID) 25 MCG TABLET    Take 25 mcg by mouth Daily (before breakfast). MILK THISTLE PO    Take 250 mcg by mouth. PROPAFENONE (RYTHMOL) 150 MG TABLET    Take 1 Tab by mouth three (3) times daily. TIMOLOL (TIMOPTIC) 0.25 % OPHTHALMIC SOLUTION    Administer 1 Drop to both eyes two (2) times a day. TRAVOPROST (TRAVATAN Z) 0.004 % OPHTHALMIC SOLUTION    Administer 1 Drop to both eyes every evening. ZINC 50 MG TAB TABLET    Take  by mouth daily.    These Medications have changed    No medications on file   Stop Taking    No medications on file

## 2021-03-23 NOTE — PROGRESS NOTES
Pt brought to ED after [facility] Alert. Pt reported to have orthostatic hypotension. Transferring RN reports that patient became hypotensive and diaphoretic when standing, bp 66/41. Denies pain or other c/o. Turned over care to [facility].

## 2021-03-23 NOTE — ANESTHESIA PREPROCEDURE EVALUATION
Relevant Problems   CARDIOVASCULAR   (+) Acute on chronic congestive heart failure (HCC)   (+) RBBB (right bundle branch block with left anterior fascicular block)       Anesthetic History               Review of Systems / Medical History  Patient summary reviewed and pertinent labs reviewed    Pulmonary                   Neuro/Psych              Cardiovascular    Hypertension        Dysrhythmias            GI/Hepatic/Renal                Endo/Other             Other Findings   Comments: Acute on chronic congestive heart failure (HCC)  RBBB (right bundle branch block with left anterior fascicular block           Physical Exam    Airway  Mallampati: II  TM Distance: 4 - 6 cm  Neck ROM: normal range of motion   Mouth opening: Normal     Cardiovascular  Regular rate and rhythm,  S1 and S2 normal,  no murmur, click, rub, or gallop  Rhythm: regular  Rate: normal         Dental  No notable dental hx       Pulmonary  Breath sounds clear to auscultation               Abdominal  GI exam deferred       Other Findings            Anesthetic Plan    ASA: 4  Anesthesia type: MAC          Induction: Intravenous  Anesthetic plan and risks discussed with: Patient      I have informed the patient or their guardian of the nature and purpose of the type of anesthesia, the reasonable alternative anesthetic methods, pertinent foreseeable risks involved and the possibility of complications. I have explained that an alternative form of anesthesia may be required by unexpected conditions arising before or during the procedure. It is understood that general anesthesia may be required for safety or comfort. Questions have been answered to the satisfaction of the patient who accepts the risks and agrees to proceed as planned. The above anesthetic review of medical history, physical exam, tests, assessment, subsequent anesthetic plan and consent have been accomplished pre-procedure.

## 2021-03-23 NOTE — PROGRESS NOTES
Called son and had a long discussion about his weakness. As EF normal and BP and Sodium are low, encourage more salt and fluid intake.

## 2021-03-23 NOTE — DISCHARGE INSTRUCTIONS
Patient Education        Electrical Cardioversion: What to Expect at Home  Your Recovery     Electrical cardioversion is a treatment for an abnormal heartbeat, such as atrial fibrillation, supraventricular tachycardia, or ventricular tachycardia (VT). Your doctor used a brief electrical shock to reset your heart's rhythm. After the procedure, you may have redness, like a sunburn, where the patches were. The medicines you got to make you sleepy may make you feel drowsy for the rest of the day. Your doctor may have you take medicines to help the heart beat normally and to prevent blood clots. This care sheet gives you a general idea about how long it will take for you to recover. But each person recovers at a different pace. Follow the steps below to feel better as quickly as possible. How can you care for yourself at home? Medicines    · Be safe with medicines. Take your medicines exactly as prescribed. Call your doctor if you think you are having a problem with your medicine. You may take one or more of the following medicines:  ? Rate-control medicines to slow the heart rate. These include beta-blockers, calcium channel blockers, and digoxin. ? Rhythm control medicines that help the heart keep a normal rhythm. ? Blood thinners, also called anticoagulants, which help prevent blood clots. You will get more details on the specific medicines your doctor prescribes. Be sure you know how to take your medicines safely.     · Do not take any vitamins, over-the-counter medicines, or herbal products without talking to your doctor first.   Exercise    · Start light exercise if your doctor says that it's okay. Even a small amount will help you get stronger, have more energy, and manage your stress. Walking is an easy way to get exercise. Start out by walking a little more than you did in the hospital. Bit by bit, increase the amount you walk.     · When you exercise, watch for signs that your heart is working too hard. You are pushing too hard if you cannot talk while you are exercising. If you become short of breath or dizzy or have chest pain, sit down and rest right away.     · Check your pulse regularly. Place two fingers on the artery at the palm side of your wrist in line with your thumb. If your heartbeat seems uneven or fast, talk to your doctor. Other instructions    · Ask your doctor when you can drive again.     · Do not smoke. If you need help quitting, talk to your doctor about stop-smoking programs and medicines. These can increase your chances of quitting for good.     · Limit alcohol. Follow-up care is a key part of your treatment and safety. Be sure to make and go to all appointments, and call your doctor if you are having problems. It's also a good idea to know your test results and keep a list of the medicines you take. When should you call for help? Call 911 anytime you think you may need emergency care. For example, call if:    · You passed out (lost consciousness).     · You have chest pain or pressure. This may occur with:  ? Sweating. ? Shortness of breath. ? Nausea or vomiting. ? Pain that spreads from the chest to the neck, jaw, or one or both shoulders or arms. ? A fast or uneven pulse. After calling 911, the  may tell you to chew 1 adult-strength or 2 to 4 low-dose aspirin. Wait for an ambulance. Do not try to drive yourself.     · You have symptoms of a stroke. These may include:  ? Sudden numbness, tingling, weakness, or loss of movement in your face, arm, or leg, especially on only one side of your body. ? Sudden vision changes. ? Sudden trouble speaking. ? Sudden confusion or trouble understanding simple statements. ? Sudden problems with walking or balance. ? A sudden, severe headache that is different from past headaches.    Call your doctor now or seek immediate medical care if:    · You feel dizzy or lightheaded, or you feel like you may faint.     · You have a fast or irregular heartbeat. Watch closely for any changes in your health, and be sure to contact your doctor if you have any problems. Where can you learn more? Go to http://www.gray.com/  Enter A617 in the search box to learn more about \"Electrical Cardioversion: What to Expect at Home. \"  Current as of: December 16, 2019               Content Version: 12.6  © 3817-4668 [employer], [email or web address]. Care instructions adapted under license by [employer] (which disclaims liability or warranty for this information). If you have questions about a medical condition or this instruction, always ask your healthcare professional. [address] any warranty or liability for your use of this information.

## 2021-03-23 NOTE — PROGRESS NOTES
Indication:  A Fib       After appropriate consent and IV sedation by anesthesia, single DC shock of 200 Joules of biphasic current converted the patient to sinus rhythm successfully without any complications. Conclusion:  Successful cardioversion of A Fib to Normal Sinus Rhythm. EKG requested.

## 2021-03-23 NOTE — ANESTHESIA POSTPROCEDURE EVALUATION
Procedure(s):  EP CARDIOVERSION.     MAC    Anesthesia Post Evaluation      Multimodal analgesia: multimodal analgesia used between 6 hours prior to anesthesia start to PACU discharge  Patient location during evaluation: bedside  Patient participation: complete - patient participated  Level of consciousness: awake  Pain score: 0  Pain management: adequate  Airway patency: patent  Anesthetic complications: no  Cardiovascular status: hypotensive and stable  Respiratory status: acceptable  Hydration status: hypovolemic  Post anesthesia nausea and vomiting:  none  Final Post Anesthesia Temperature Assessment:  Normothermia (36.0-37.5 degrees C)      INITIAL Post-op Vital signs:   Vitals Value Taken Time   /83 03/23/21 1542   Temp 36 °C (96.8 °F) 03/23/21 1404   Pulse 111 03/23/21 1542   Resp 22 03/23/21 1542   SpO2 96 % 03/23/21 1542

## 2021-03-23 NOTE — INTERVAL H&P NOTE
Update History & Physical 
 
The Patient's History and Physical of March 8,  
cardioversion was reviewed with the patient and I examined the patient. There was no change. The surgical site was confirmed by the patient and me. Plan:  The risk, benefits, expected outcome, and alternative to the recommended procedure have been discussed with the patient. Patient understands and wants to proceed with the procedure.  
 
Electronically signed by Latricia Meyers MD on 3/23/2021 at 9:50 AM

## 2021-03-23 NOTE — PROGRESS NOTES
Orthostatic changes are severely positive despite a liter of IV fluids and cardioversion. Discussed with son and plan to send the patient to the emergency room. He will need admission to manage his orthostasis as he has been falling frequently at home.

## 2021-03-23 NOTE — PROGRESS NOTES
2563:    Cath holding summary     Patient escorted to cath holding from waiting area ambulatory, alert and oriented x 4, voicing no complaints. Changed into gown and placed on monitor. NPO since MN. Lab results, med rec and H&P reviewed on chart. PIV x 1 inserted without difficulty. Patient's Son, Rudy Lucas, voiced concerns about pt. Decline in the past week; lethargic and unable to ambulate independently. Dr. Anya Maier called and notified. 1200: Blood pressure assessed laying, sitting on the side of bed, and standing in both arms bilaterally. Orthostatic changes noted and pt. Symptomatic. See flowsheet for correlating blood pressures. 1209:    Code green called per Dr. Anya Maier. Son notified. 1230: Trasported pt. To ED on monitor with YOSVANY Nelson. Bedside verbal report given to Irina Viramontes RN.

## 2021-03-23 NOTE — Clinical Note
Status[de-identified] INPATIENT [101]   Type of Bed: Telemetry [19]   Inpatient Hospitalization Certified Necessary for the Following Reasons: 3. Patient receiving treatment that can only be provided in an inpatient setting (further clarification in H&P documentation)   Admitting Diagnosis: Fever [[id number]]   Admitting Diagnosis: Orthostatic hypotension [458. 0. ICD-9-CM]   Admitting Physician: Ghazala Golden [[id number]]   Attending Physician: Ghazala Golden [[id number]]   Estimated Length of Stay: 2 Midnights   Discharge Plan[de-identified] Home with Office Follow-up

## 2021-03-23 NOTE — PROGRESS NOTES
Kinetic Dosing- Initial Progress Note    Pharmacy Consult ordered by Dr. Jordan Combs     Indication: sepsis    Patient clinical status and labs ordered/reviewed. Pt Weight Weight: 93.9 kg (207 lb)   Serum Creatinine Lab Results   Component Value Date/Time    Creatinine 1.05 03/23/2021 07:54 AM    Creatinine, POC 1.1 05/22/2019 04:09 PM       Creatinine Clearance Estimated Creatinine Clearance: 62.3 mL/min (based on SCr of 1.05 mg/dL). BUN Lab Results   Component Value Date/Time    BUN 24 (H) 03/23/2021 07:54 AM       WBC Lab Results   Component Value Date/Time    WBC 5.5 03/23/2021 07:54 AM      Temperature Temp: (!) 100.6 °F (38.1 °C)     HR Pulse (Heart Rate): 96     BP BP: (!) 96/46           Kinetic Dosing Parameters:   Vd = 54L     K = .049 hr-1              t ½ = 14h    Drug Levels:   Vancomycin    No results for input(s): VANCP, VANCT, VANCR, VANRA in the last 72 hours. Gentamicin   No results for input(s): GENP, GENT in the last 72 hours. No lab exists for component:  GENR   Tobramycin   No results for input(s): TOBP, TOBT, TOBR in the last 72 hours. Amikacin   No results for input(s): Darcus Wilkerson in the last 72 hours.     No lab exists for component:  KATHLEEN Roper DAMIKR     Dose for naïve patient was initiated at: Vancomycin 1750mg IV x1 followed by 1250mg q18h    Continue to monitor    Sign: NANDO Duff [facility]  Date: 3/23/2021  Time: 1:53 PM

## 2021-03-24 ENCOUNTER — APPOINTMENT (OUTPATIENT)
Dept: GENERAL RADIOLOGY | Age: 86
DRG: 292 | End: 2021-03-24
Attending: INTERNAL MEDICINE
Payer: MEDICARE

## 2021-03-24 PROBLEM — I50.33 ACUTE ON CHRONIC DIASTOLIC CONGESTIVE HEART FAILURE (HCC): Status: ACTIVE | Noted: 2017-07-19

## 2021-03-24 LAB
25(OH)D3 SERPL-MCNC: 24.8 NG/ML (ref 30–100)
ANION GAP SERPL CALC-SCNC: 7 MMOL/L (ref 3–18)
ATRIAL RATE: 109 BPM
ATRIAL RATE: 117 BPM
BASOPHILS # BLD: 0 K/UL (ref 0–0.06)
BASOPHILS NFR BLD: 0 % (ref 0–3)
BUN SERPL-MCNC: 25 MG/DL (ref 7–18)
BUN/CREAT SERPL: 29 (ref 12–20)
CALCIUM SERPL-MCNC: 7.7 MG/DL (ref 8.5–10.1)
CALCULATED R AXIS, ECG10: -59 DEGREES
CALCULATED R AXIS, ECG10: -59 DEGREES
CALCULATED T AXIS, ECG11: 17 DEGREES
CALCULATED T AXIS, ECG11: 41 DEGREES
CHLORIDE SERPL-SCNC: 105 MMOL/L (ref 100–111)
CO2 SERPL-SCNC: 22 MMOL/L (ref 21–32)
COVID-19 RAPID TEST, COVR: NOT DETECTED
CREAT SERPL-MCNC: 0.87 MG/DL (ref 0.6–1.3)
DIAGNOSIS, 93000: NORMAL
DIAGNOSIS, 93000: NORMAL
DIFFERENTIAL METHOD BLD: ABNORMAL
EOSINOPHIL # BLD: 0.4 K/UL (ref 0–0.4)
EOSINOPHIL NFR BLD: 6 % (ref 0–5)
ERYTHROCYTE [DISTWIDTH] IN BLOOD BY AUTOMATED COUNT: 13.2 % (ref 11.6–14.5)
FOLATE SERPL-MCNC: >20 NG/ML (ref 3.1–17.5)
GLUCOSE SERPL-MCNC: 82 MG/DL (ref 74–99)
HCT VFR BLD AUTO: 28.6 % (ref 36–48)
HGB BLD-MCNC: 9.6 G/DL (ref 13–16)
LYMPHOCYTES # BLD: 0.5 K/UL (ref 0.8–3.5)
LYMPHOCYTES NFR BLD: 7 % (ref 20–51)
MAGNESIUM SERPL-MCNC: 2 MG/DL (ref 1.6–2.6)
MCH RBC QN AUTO: 30.9 PG (ref 24–34)
MCHC RBC AUTO-ENTMCNC: 33.6 G/DL (ref 31–37)
MCV RBC AUTO: 92 FL (ref 74–97)
MONOCYTES # BLD: 0.3 K/UL (ref 0–1)
MONOCYTES NFR BLD: 5 % (ref 2–9)
NEUTS BAND NFR BLD MANUAL: 17 % (ref 0–5)
NEUTS SEG # BLD: 5.3 K/UL (ref 1.8–8)
NEUTS SEG NFR BLD: 65 % (ref 42–75)
P-R INTERVAL, ECG05: 256 MS
PHOSPHATE SERPL-MCNC: 2.7 MG/DL (ref 2.5–4.9)
PLATELET # BLD AUTO: 146 K/UL (ref 135–420)
PLATELET COMMENTS,PCOM: ABNORMAL
PMV BLD AUTO: 9.8 FL (ref 9.2–11.8)
POTASSIUM SERPL-SCNC: 3.8 MMOL/L (ref 3.5–5.5)
Q-T INTERVAL, ECG07: 366 MS
Q-T INTERVAL, ECG07: 366 MS
QRS DURATION, ECG06: 136 MS
QRS DURATION, ECG06: 142 MS
QTC CALCULATION (BEZET), ECG08: 490 MS
QTC CALCULATION (BEZET), ECG08: 492 MS
RBC # BLD AUTO: 3.11 M/UL (ref 4.7–5.5)
RBC MORPH BLD: ABNORMAL
SARS-COV-2, COV2: NORMAL
SODIUM SERPL-SCNC: 134 MMOL/L (ref 136–145)
SOURCE, COVRS: NORMAL
VENTRICULAR RATE, ECG03: 108 BPM
VENTRICULAR RATE, ECG03: 109 BPM
VIT B12 SERPL-MCNC: 700 PG/ML (ref 211–911)
WBC # BLD AUTO: 6.5 K/UL (ref 4.6–13.2)

## 2021-03-24 PROCEDURE — 74011000250 HC RX REV CODE- 250: Performed by: INTERNAL MEDICINE

## 2021-03-24 PROCEDURE — 74011250636 HC RX REV CODE- 250/636: Performed by: INTERNAL MEDICINE

## 2021-03-24 PROCEDURE — 2709999900 HC NON-CHARGEABLE SUPPLY

## 2021-03-24 PROCEDURE — 99233 SBSQ HOSP IP/OBS HIGH 50: CPT | Performed by: INTERNAL MEDICINE

## 2021-03-24 PROCEDURE — 74011250636 HC RX REV CODE- 250/636: Performed by: HOSPITALIST

## 2021-03-24 PROCEDURE — 87635 SARS-COV-2 COVID-19 AMP PRB: CPT

## 2021-03-24 PROCEDURE — 83735 ASSAY OF MAGNESIUM: CPT

## 2021-03-24 PROCEDURE — 85025 COMPLETE CBC W/AUTO DIFF WBC: CPT

## 2021-03-24 PROCEDURE — 74011250637 HC RX REV CODE- 250/637: Performed by: INTERNAL MEDICINE

## 2021-03-24 PROCEDURE — 92526 ORAL FUNCTION THERAPY: CPT

## 2021-03-24 PROCEDURE — 80048 BASIC METABOLIC PNL TOTAL CA: CPT

## 2021-03-24 PROCEDURE — 77030040830 HC CATH URETH FOL MDII -A

## 2021-03-24 PROCEDURE — 74011250637 HC RX REV CODE- 250/637: Performed by: HOSPITALIST

## 2021-03-24 PROCEDURE — 97530 THERAPEUTIC ACTIVITIES: CPT

## 2021-03-24 PROCEDURE — 65660000000 HC RM CCU STEPDOWN

## 2021-03-24 PROCEDURE — 82306 VITAMIN D 25 HYDROXY: CPT

## 2021-03-24 PROCEDURE — 92611 MOTION FLUOROSCOPY/SWALLOW: CPT

## 2021-03-24 PROCEDURE — 77010033678 HC OXYGEN DAILY

## 2021-03-24 PROCEDURE — 84100 ASSAY OF PHOSPHORUS: CPT

## 2021-03-24 PROCEDURE — 77030040831 HC BAG URINE DRNG MDII -A

## 2021-03-24 PROCEDURE — 92610 EVALUATE SWALLOWING FUNCTION: CPT

## 2021-03-24 PROCEDURE — 74230 X-RAY XM SWLNG FUNCJ C+: CPT

## 2021-03-24 PROCEDURE — 93005 ELECTROCARDIOGRAM TRACING: CPT

## 2021-03-24 PROCEDURE — 82607 VITAMIN B-12: CPT

## 2021-03-24 PROCEDURE — 97162 PT EVAL MOD COMPLEX 30 MIN: CPT

## 2021-03-24 PROCEDURE — 74011000250 HC RX REV CODE- 250: Performed by: HOSPITALIST

## 2021-03-24 PROCEDURE — 74011000258 HC RX REV CODE- 258: Performed by: HOSPITALIST

## 2021-03-24 RX ORDER — METOPROLOL TARTRATE 25 MG/1
25 TABLET, FILM COATED ORAL 2 TIMES DAILY
Status: DISCONTINUED | OUTPATIENT
Start: 2021-03-24 | End: 2021-03-29

## 2021-03-24 RX ORDER — MIDODRINE HYDROCHLORIDE 5 MG/1
5 TABLET ORAL
Status: DISCONTINUED | OUTPATIENT
Start: 2021-03-24 | End: 2021-03-25

## 2021-03-24 RX ORDER — VANCOMYCIN HYDROCHLORIDE
1250
Status: DISCONTINUED | OUTPATIENT
Start: 2021-03-24 | End: 2021-03-24

## 2021-03-24 RX ORDER — FACIAL-BODY WIPES
10 EACH TOPICAL
Status: COMPLETED | OUTPATIENT
Start: 2021-03-24 | End: 2021-03-24

## 2021-03-24 RX ORDER — FLUDROCORTISONE ACETATE 0.1 MG/1
0.1 TABLET ORAL DAILY
Status: DISCONTINUED | OUTPATIENT
Start: 2021-03-24 | End: 2021-03-24

## 2021-03-24 RX ADMIN — Medication 10 ML: at 18:51

## 2021-03-24 RX ADMIN — PROPAFENONE HYDROCHLORIDE 150 MG: 150 TABLET, FILM COATED ORAL at 18:49

## 2021-03-24 RX ADMIN — ACETAMINOPHEN 650 MG: 325 TABLET ORAL at 13:57

## 2021-03-24 RX ADMIN — LEVOTHYROXINE SODIUM 25 MCG: 25 TABLET ORAL at 09:11

## 2021-03-24 RX ADMIN — PIPERACILLIN AND TAZOBACTAM 3.38 G: 3; .375 INJECTION, POWDER, FOR SOLUTION INTRAVENOUS at 09:11

## 2021-03-24 RX ADMIN — POLYETHYLENE GLYCOL 3350 17 G: 17 POWDER, FOR SOLUTION ORAL at 20:26

## 2021-03-24 RX ADMIN — BARIUM SULFATE 30 ML: 400 SUSPENSION ORAL at 10:30

## 2021-03-24 RX ADMIN — TIMOLOL MALEATE 1 DROP: 2.5 SOLUTION/ DROPS OPHTHALMIC at 10:36

## 2021-03-24 RX ADMIN — BARIUM SULFATE 45 ML: 400 SUSPENSION ORAL at 10:30

## 2021-03-24 RX ADMIN — BARIUM SULFATE 30 G: 960 POWDER, FOR SUSPENSION ORAL at 10:30

## 2021-03-24 RX ADMIN — Medication 1000 UNITS: at 09:11

## 2021-03-24 RX ADMIN — APIXABAN 5 MG: 5 TABLET, FILM COATED ORAL at 09:57

## 2021-03-24 RX ADMIN — APIXABAN 5 MG: 5 TABLET, FILM COATED ORAL at 01:22

## 2021-03-24 RX ADMIN — BARIUM SULFATE 45 ML: 400 PASTE ORAL at 10:30

## 2021-03-24 RX ADMIN — SODIUM CHLORIDE 75 ML/HR: 900 INJECTION, SOLUTION INTRAVENOUS at 01:17

## 2021-03-24 RX ADMIN — PIPERACILLIN AND TAZOBACTAM 3.38 G: 3; .375 INJECTION, POWDER, FOR SOLUTION INTRAVENOUS at 03:17

## 2021-03-24 RX ADMIN — PROPAFENONE HYDROCHLORIDE 150 MG: 150 TABLET, FILM COATED ORAL at 01:17

## 2021-03-24 RX ADMIN — MIDODRINE HYDROCHLORIDE 5 MG: 5 TABLET ORAL at 13:57

## 2021-03-24 RX ADMIN — METOPROLOL TARTRATE 25 MG: 25 TABLET, FILM COATED ORAL at 18:48

## 2021-03-24 RX ADMIN — BARIUM SULFATE 700 MG: 700 TABLET ORAL at 10:30

## 2021-03-24 RX ADMIN — VANCOMYCIN HYDROCHLORIDE 1250 MG: 10 INJECTION, POWDER, LYOPHILIZED, FOR SOLUTION INTRAVENOUS at 09:56

## 2021-03-24 RX ADMIN — METOPROLOL TARTRATE 25 MG: 25 TABLET, FILM COATED ORAL at 10:35

## 2021-03-24 RX ADMIN — BISACODYL 10 MG: 10 SUPPOSITORY RECTAL at 09:57

## 2021-03-24 RX ADMIN — MIDODRINE HYDROCHLORIDE 5 MG: 5 TABLET ORAL at 18:49

## 2021-03-24 RX ADMIN — Medication 10 ML: at 03:18

## 2021-03-24 RX ADMIN — APIXABAN 5 MG: 5 TABLET, FILM COATED ORAL at 22:41

## 2021-03-24 RX ADMIN — WATER 1 G: 1 INJECTION INTRAMUSCULAR; INTRAVENOUS; SUBCUTANEOUS at 13:58

## 2021-03-24 NOTE — ED NOTES
Received patient report from [address] [place] [address],[address], [facility]. Patient is AxOx4. Patient complained of not being ambulatory and needing help with ADLs.

## 2021-03-24 NOTE — ED NOTES
Pt in bed resting quietly showing no signs of distress. Patient stated that he needs to urinate. Will assist pt with standing up and using the urinal. Pt has no other request or complaints at this time.

## 2021-03-24 NOTE — ED NOTES
Pt was not able to urinate in urinal properly and urinated on himself. Changed pts linen and applied condom catheter. Once pt urinates again, will obtain urine sample. Offered pt food and something to drink. Pt denied food but did accept some water. Pt now in bed resting comfortably.

## 2021-03-24 NOTE — PROGRESS NOTES
Problem: Mobility Impaired (Adult and Pediatric)  Goal: *Acute Goals and Plan of Care (Insert Text)  Description: Physical Therapy Goals  Initiated 3/24/2021 and to be accomplished within 7 day(s)  1. Patient will move from supine to sit and sit to supine  in bed with minimal assistance/contact guard assist.    2.  Patient will transfer from bed to chair and chair to bed with minimal assistance/contact guard assist using the least restrictive device. 3.  Patient will perform sit to stand with minimal assistance/contact guard assist.  4.  Patient will ambulate with minimal assistance/contact guard assist for 25 feet with the least restrictive device. PLOF: Poor historian. Reports unable to walk past 2-3 weeks. Outcome: Progressing Towards Goal   PHYSICAL THERAPY EVALUATION    Patient: Toan Galvez (37 y.o. male)  Date: 3/24/2021  Primary Diagnosis: Fever [R50.9]  Orthostatic hypotension [I95.1]  Precautions: Fall  ASSESSMENT :  Oriented to self and place. Disoriented to month (reports April 23). Follows commands. Max A for supine to sit. Seated EOB with fair balance; min/mod A for midline seated posture. Poor cervical posture; cues to extend with no demonstration. Once seated EOB, verbalizes need to use the bathroom. Max A for sit to stand from elevated bed height. Attempted two times with inability to clear buttocks to achieve standing to transfer to bedside commode. Poor safety awareness. Max A for for sit to supine. Mod A for rolling to place on bed pan. Mod A for rolling for bed pan removal and linen change. Max A for scooting up in bed utilizing TAPS system. Seated in bed with HOB elevated and lunch tray in front of patient. Educated on need for RN assistance with mobility; verbalized understanding. Call bell in reach. Assessed BP with mobility d/t admission report of orthostatic hypotension. Denies symptoms during mobility.    BP supine prior to mobility 117/82  BP seated /74    Poor historian. Reports living at home with wife. Reports unable to walk past 2-3 weeks; unable to provided reason for decline. However, denies need for assistance with getting to/from bathroom at home. Unclear most recent prior level of function. Patient will benefit from skilled intervention to address the above impairments. Patient's rehabilitation potential is considered to be Fair  Factors which may influence rehabilitation potential include:   []         None noted  []         Mental ability/status  [x]         Medical condition  []         Home/family situation and support systems  []         Safety awareness  []         Pain tolerance/management  []         Other:      PLAN :  Recommendations and Planned Interventions:   [x]           Bed Mobility Training             [x]    Neuromuscular Re-Education  [x]           Transfer Training                   []    Orthotic/Prosthetic Training  [x]           Gait Training                          []    Modalities  [x]           Therapeutic Exercises           []    Edema Management/Control  [x]           Therapeutic Activities            [x]    Family Training/Education  [x]           Patient Education  []           Other (comment):    Frequency/Duration: Patient will be followed by physical therapy 3-5 times a week to address goals. Discharge Recommendations: Skilled Nursing Facility  Further Equipment Recommendations for Discharge: rolling walker     SUBJECTIVE:   Patient stated Megan Birch got up and went and waited until I had the energy to come back.     OBJECTIVE DATA SUMMARY:     Past Medical History:   Diagnosis Date    Unspecified essential hypertension 7/19/2017     Past Surgical History:   Procedure Laterality Date    HX BACK SURGERY  1971     Barriers to Learning/Limitations: yes;  altered mental status (i.e.Sedation, Confusion)  Compensate with: Visual Cues, Verbal Cues, Tactile Cues and Kinesthetic Cues    Home Situation:  Home Situation  Home Environment: Private residence  # Steps to Enter: 3  Rails to Enter: Yes  One/Two Story Residence: One story  Living Alone: No  Support Systems: Spouse/Significant Other/Partner  Patient Expects to be Discharged to[de-identified] Private residence  Current DME Used/Available at Home: liseth Jung, [address] [facility] [place], straight    Critical Behavior:  Neurologic State: Alert  Orientation Level: Oriented to person;Oriented to place; Disoriented to time  Cognition: Follows commands    Strength:    Manual Muscle Testing (LE)         R     L    Hip Flexion:   3/5  3/5  Knee EXT:   3/5  3/5  Knee FLEX:   3/5  3/5  Ankle DF:   3/5  3/5  _________________________________________________   Range Of Motion:  BLE AROM decreased/PROM decreased functional  Functional Mobility:  Bed Mobility:  Rolling: Moderate assistance  Supine to Sit: Maximum assistance  Sit to Supine: Maximum assistance  Scooting: Maximum assistance  Transfers: Unable to achieve  Sit to Stand: Maximum assistance  Balance:   Sitting: Impaired  Sitting - Static: Fair (occasional)  Sitting - Dynamic: Fair (occasional)  Neuro Re-education:  Seated balance 5 minutes  Therapeutic Exercises:   Rolling x4  Sit to stand attempt x2  Pain:  Pain level pre-treatment: 0/10   Pain level post-treatment: 0/10     Activity Tolerance:   Fair    After treatment:   []         Patient left in no apparent distress sitting up in chair  [x]         Patient left in no apparent distress in bed  [x]         Call bell left within reach  [x]         Nursing notified  []         Caregiver present  []         Bed alarm activated  []         SCDs applied    COMMUNICATION/EDUCATION:   [x]         Role of physical therapy and plan of care in the acute care setting. [x]         Fall prevention education was provided and the patient/caregiver indicated understanding. [x]         Patient/family have participated as able in goal setting and plan of care.   []         Patient/family agree to work toward stated goals and plan of care.  []         Patient understands intent and goals of therapy, but is neutral about his/her participation. []         Patient is unable to participate in goal setting/plan of care: ongoing with therapy staff.     Thank you for this referral.  Alvaro Hollis, PT   Time Calculation: 32 mins    Eval Complexity: History: MEDIUM  Complexity : 1-2 comorbidities / personal factors will impact the outcome/ POC Exam:MEDIUM Complexity : 3 Standardized tests and measures addressing body structure, function, activity limitation and / or participation in recreation  Presentation: MEDIUM Complexity : Evolving with changing characteristics  Clinical Decision Making:Medium Complexity    Clinical judgement; ROM, MMT, functional mobility Overall Complexity:MEDIUM

## 2021-03-24 NOTE — PROGRESS NOTES
Reason for Admission:  Fever [R50.9]  Orthostatic hypotension [I95.1]                 RUR Score:    15%            Plan for utilizing home health:    TBD, pt came in for cardioversion in cath lab and had post procedural orthostatic hypotension. Being admitted for workup. May need SNF or HH. Wife wants to talk with daughter about choices and let us know                      Likelihood of Readmission:   LOW                         Transition of Care Plan:              Initial assessment completed with spouse/SO. Cognitive status of patient: not assessed, already transferred to floor. Face sheet information confirmed:  yes. The patient designates wifeMonique Favor to participate in his discharge plan and to receive any needed information. This patient lives in a single family home with spouse. Patient is able to navigate steps as needed; has only 2 stairs to enter house, home is single level. Prior to hospitalization, patient was considered to be independent with ADLs/IADLS : yes . Patient has a current ACP document on file: no      Healthcare Decision Maker:     Click here to complete [employer] including selection of the Healthcare Decision Maker Relationship (ie \"Primary\")    The spouse will be available to transport patient home upon discharge. The patient already has Rollator, Shower chair, and toilet riser available in the home. Patient is not currently active with home health. Patient has not stayed in a skilled nursing facility or rehab. This patient is on dialysis :no    Currently, the discharge plan is TBD. The patient states that he can obtain his medications from the pharmacy, and take his medications as directed. Patient's current insurance is Medicare A and [facility]       Care Management Interventions  PCP Verified by CM:  Yes  Mode of Transport at Discharge: Self  Transition of Care Consult (CM Consult): Discharge Planning  Current Support Network: Lives with Spouse  Confirm Follow Up Transport: Family  Name of the Patient Representative Who was Provided with a Choice of Provider and Agrees with the Discharge Plan: wife [facility] with daughter and let us know  Discharge Location  Discharge Placement: Unable to determine at this time        Hernan Morales RN - Outcomes Manager  [phone]

## 2021-03-24 NOTE — H&P
History & Physical    Patient: Cathy Garcia MRN: [id number]  CSN: [id number]    YOB: 1935  Age: 81 y.o. Sex: male      DOA: 3/23/2021    Chief Complaint   Patient presents with    Hypotension          HPI:     Cathy Garcia is a 81 y.o. male who presented to cardiac cath lab this am where he underwent single DC shock of 200 Joules biphasic current, which converted the patient to sinus rhythm successfully without complication. Per chart review, patient's son Jaqueline Uribe voiced concerns about patient's decline in the past week; noted lethargic and unable to ambulate independently. Patient noted with symptomatic orthostatic hypotension after the procedure, patient became hypotensive and diaphoretic when standing, bp 66/41. Code green called, transported to ED. In the ED, noted with fever 100.6, mildly tachycardic 111. No leukocytosis nor bandemia. Head CT and CXR negative. UA pending. Blood cx have been sent. nsg states they will send urine this evening. In the ED, patient received apixiban, vanco, zosyn, rythmol, and NS 1 l bolus. Patient denies chest pain, shortness of breath, cough, Covid exposure, change to his taste or smell. He also denies abdominal pain, dysuria. He states he has been feeling generally weak at home, not getting around as well over the past several days as he usually does. He states his son has already been updated, no need to call him at this time. Nursing notes cough with p.o. intake.       Past Medical History:   Diagnosis Date    Unspecified essential hypertension 7/19/2017       Past Surgical History:   Procedure Laterality Date    HX BACK SURGERY  1971       Family History   Problem Relation Age of Onset    Cancer Sister     Heart Attack Neg Hx     Stroke Neg Hx        Social History     Socioeconomic History    Marital status:      Spouse name: Not on file    Number of children: Not on file    Years of education: Not on file    Highest education level: Not on file   Tobacco Use    Smoking status: Former Smoker     Quit date: 1982     Years since quittin.7    Smokeless tobacco: Never Used   Substance and Sexual Activity    Alcohol use: No    Drug use: No       Prior to Admission medications    Medication Sig Start Date End Date Taking? Authorizing Provider   propafenone (RYTHMOL) 150 mg tablet Take 1 Tab by mouth three (3) times daily. 21   Ravindra Mark MD   timolol (TIMOPTIC) 0.25 % ophthalmic solution Administer 1 Drop to both eyes two (2) times a day. Provider, Historical   travoprost (TRAVATAN Z) 0.004 % ophthalmic solution Administer 1 Drop to both eyes every evening. Provider, Historical   apixaban (ELIQUIS) 5 mg tablet Take 1 Tab by mouth two (2) times a day. 21   Terrie Sheppard NP   MILK THISTLE PO Take 250 mcg by mouth. Provider, Historical   docusate sodium (STOOL SOFTENER PO) Take  by mouth. Provider, Historical   levothyroxine (SYNTHROID) 25 mcg tablet Take 25 mcg by mouth Daily (before breakfast). Provider, Historical   alfuzosin SR (UROXATRAL) 10 mg SR tablet Take  by mouth daily. Provider, Historical   biotin 1,000 mcg chew Take  by mouth. Provider, Historical   zinc 50 mg tab tablet Take  by mouth daily. Provider, Historical   ASCORBATE CALCIUM (RUDY-C PO) Take  by mouth. Provider, Historical   cholecalciferol (VITAMIN D3) 1,000 unit cap Take  by mouth daily. Provider, Historical   co-enzyme Q-10 (CO Q-10) 100 mg capsule Take 100 mg by mouth daily. Provider, Historical       Allergies   Allergen Reactions    Seasonale [Levonorgestrel-Ethinyl Estrad] Sneezing     Seasonal allergy not with SEASONALE [LEVONORGESTREL-ETHINYL ESTRAD]       Review of Systems  GENERAL: No fevers or chills. HEENT: No change in vision, no earache, sore throat or sinus congestion. NECK: No pain or stiffness. CARDIOVASCULAR: No chest pain or pressure. No palpitations.    PULMONARY: No shortness of breath, cough or wheeze. GASTROINTESTINAL: No abdominal pain, nausea, vomiting or diarrhea, melena or       bright red blood per rectum. GENITOURINARY: No urinary frequency, urgency, hesitancy or dysuria. MUSCULOSKELETAL: No joint or muscle pain, no back pain, no recent trauma. DERMATOLOGIC: No rash, no itching, no lesions. ENDOCRINE: No polyuria, polydipsia, no heat or cold intolerance. No recent change in    weight. HEMATOLOGICAL: No anemia or easy bruising or bleeding. NEUROLOGIC: No headache, seizures, numbness, tingling or weakness. PSYCHIATRIC: No depression, anxiety, mood disorder, no loss of interest in normal       activity or change in sleep pattern. Physical Exam:     Physical Exam:  Visit Vitals  /83   Pulse (!) 111   Temp (!) 100.6 °F (38.1 °C)   Resp 22   Ht 6' 0.01\" (1.829 m)   Wt 93.9 kg (207 lb)   SpO2 96%   BMI 28.07 kg/m²      O2 Device: Room air    Temp (24hrs), Av.3 °F (37.4 °C), Min:96.8 °F (36 °C), Max:100.6 °F (38.1 °C)       No intake/output data recorded. No intake/output data recorded. General:  Alert, cooperative, no distress, appears stated age. Head:  Normocephalic, without obvious abnormality, atraumatic. Eyes:  Conjunctivae/corneas clear. PERRL, EOMs intact. Nose:  Wearing mask. Throat:  Wearing mask. Neck: Supple, symmetrical, trachea midline, no adenopathy, thyroid: no enlargement/tenderness/nodules, no carotid bruit and no JVD. Back:   ROM normal. No CVA tenderness. Lungs:   Clear to auscultation bilateral anterior and infraaxillary fields   Chest wall:  No tenderness or deformity. Heart:  Regular rate and rhythm, S1, S2 normal, no murmur. Abdomen: Soft, non-tender. Bowel sounds normal. No masses,  No organomegaly. Extremities: Extremities normal, atraumatic, no cyanosis or edema. Pulses: 2+ and symmetric all extremities. Skin: Skin color, texture, turgor normal. No rashes or lesions   Neurologic: CNII-XII intact.  No focal motor or sensory deficit.        Labs Reviewed:    Recent Results (from the past 24 hour(s))   CBC W/O DIFF    Collection Time: 03/23/21  7:54 AM   Result Value Ref Range    WBC 5.5 4.6 - 13.2 K/uL    RBC 3.43 (L) 4.70 - 5.50 M/uL    HGB 10.6 (L) 13.0 - 16.0 g/dL    HCT 30.8 (L) 36.0 - 48.0 %    MCV 89.8 74.0 - 97.0 FL    MCH 30.9 24.0 - 34.0 PG    MCHC 34.4 31.0 - 37.0 g/dL    RDW 12.6 11.6 - 14.5 %    PLATELET 771 478 - 644 K/uL    MPV 9.7 9.2 - 23.0 FL   METABOLIC PANEL, BASIC    Collection Time: 03/23/21  7:54 AM   Result Value Ref Range    Sodium 128 (L) 136 - 145 mmol/L    Potassium 4.0 3.5 - 5.5 mmol/L    Chloride 96 (L) 100 - 111 mmol/L    CO2 26 21 - 32 mmol/L    Anion gap 6 3.0 - 18 mmol/L    Glucose 106 (H) 74 - 99 mg/dL    BUN 24 (H) 7.0 - 18 MG/DL    Creatinine 1.05 0.6 - 1.3 MG/DL    BUN/Creatinine ratio 23 (H) 12 - 20      GFR est AA >60 >60 ml/min/1.73m2    GFR est non-AA >60 >60 ml/min/1.73m2    Calcium 8.5 8.5 - 10.1 MG/DL   PROTHROMBIN TIME + INR    Collection Time: 03/23/21  7:54 AM   Result Value Ref Range    Prothrombin time 20.2 (H) 11.5 - 15.2 sec    INR 1.8 (H) 0.8 - 1.2     COVID-19 RAPID TEST    Collection Time: 03/23/21  7:54 AM   Result Value Ref Range    Specimen source Nasopharyngeal      COVID-19 rapid test Not detected NOTD     EKG, 12 LEAD, INITIAL    Collection Time: 03/23/21 10:04 AM   Result Value Ref Range    Ventricular Rate 79 BPM    Atrial Rate 79 BPM    P-R Interval 266 ms    QRS Duration 158 ms    Q-T Interval 426 ms    QTC Calculation (Bezet) 488 ms    Calculated P Axis 58 degrees    Calculated R Axis -59 degrees    Calculated T Axis 28 degrees    Diagnosis       Sinus rhythm with sinus arrhythmia with 1st degree AV block  Right bundle branch block  Left anterior fascicular block  Bifascicular block  Septal infarct , age undetermined  Abnormal ECG  When compared with ECG of 12-JUL-2010 08:11,  [place] interval has increased  (RBBB and left anterior fascicular block) is now present  Septal infarct is now present  Minimal criteria for Inferior infarct are no longer present  Confirmed by Wilber Elise MD, --- ([id number]) on 3/23/2021 5:07:26 PM     CBC WITH AUTOMATED DIFF    Collection Time: 03/23/21  3:20 PM   Result Value Ref Range    WBC 5.6 4.6 - 13.2 K/uL    RBC 3.26 (L) 4.70 - 5.50 M/uL    HGB 10.1 (L) 13.0 - 16.0 g/dL    HCT 29.1 (L) 36.0 - 48.0 %    MCV 89.3 74.0 - 97.0 FL    MCH 31.0 24.0 - 34.0 PG    MCHC 34.7 31.0 - 37.0 g/dL    RDW 12.8 11.6 - 14.5 %    PLATELET 102 840 - 552 K/uL    MPV 9.7 9.2 - 11.8 FL    NEUTROPHILS 80 (H) 40 - 73 %    LYMPHOCYTES 7 (L) 21 - 52 %    MONOCYTES 8 3 - 10 %    EOSINOPHILS 5 0 - 5 %    BASOPHILS 0 0 - 2 %    ABS. NEUTROPHILS 4.5 1.8 - 8.0 K/UL    ABS. LYMPHOCYTES 0.4 (L) 0.9 - 3.6 K/UL    ABS. MONOCYTES 0.4 0.05 - 1.2 K/UL    ABS. EOSINOPHILS 0.3 0.0 - 0.4 K/UL    ABS. BASOPHILS 0.0 0.0 - 0.1 K/UL    DF AUTOMATED     METABOLIC PANEL, COMPREHENSIVE    Collection Time: 03/23/21  3:20 PM   Result Value Ref Range    Sodium 130 (L) 136 - 145 mmol/L    Potassium 4.4 3.5 - 5.5 mmol/L    Chloride 100 100 - 111 mmol/L    CO2 24 21 - 32 mmol/L    Anion gap 6 3.0 - 18 mmol/L    Glucose 116 (H) 74 - 99 mg/dL    BUN 27 (H) 7.0 - 18 MG/DL    Creatinine 0.99 0.6 - 1.3 MG/DL    BUN/Creatinine ratio 27 (H) 12 - 20      GFR est AA >60 >60 ml/min/1.73m2    GFR est non-AA >60 >60 ml/min/1.73m2    Calcium 8.2 (L) 8.5 - 10.1 MG/DL    Bilirubin, total 0.7 0.2 - 1.0 MG/DL    ALT (SGPT) 41 16 - 61 U/L    AST (SGOT) 36 10 - 38 U/L    Alk.  phosphatase 98 45 - 117 U/L    Protein, total 6.4 6.4 - 8.2 g/dL    Albumin 3.1 (L) 3.4 - 5.0 g/dL    Globulin 3.3 2.0 - 4.0 g/dL    A-G Ratio 0.9 0.8 - 1.7     PROTHROMBIN TIME + INR    Collection Time: 03/23/21  3:20 PM   Result Value Ref Range    Prothrombin time 18.3 (H) 11.5 - 15.2 sec    INR 1.6 (H) 0.8 - 1.2     CARDIAC PANEL,(CK, CKMB & TROPONIN)    Collection Time: 03/23/21  3:20 PM   Result Value Ref Range    CK - MB 1.6 <3.6 ng/ml CK-MB Index 1.0 0.0 - 4.0 %     39 - 308 U/L    Troponin-I, QT 0.03 0.0 - 0.045 NG/ML   POC LACTIC ACID    Collection Time: 03/23/21  3:30 PM   Result Value Ref Range    Lactic Acid (POC) 0.84 0.40 - 2.00 mmol/L   EKG, 12 LEAD, INITIAL    Collection Time: 03/23/21  4:13 PM   Result Value Ref Range    Ventricular Rate 109 BPM    Atrial Rate 109 BPM    P-R Interval 256 ms    QRS Duration 142 ms    Q-T Interval 366 ms    QTC Calculation (Bezet) 492 ms    Calculated R Axis -59 degrees    Calculated T Axis 41 degrees    Diagnosis       Sinus tachycardia with 1st degree AV block  Right bundle branch block  Left anterior fascicular block  Bifascicular block  Septal infarct (cited on or before 12-JUL-2010)  Abnormal ECG  When compared with ECG of 23-MAR-2021 10:04,  Questionable change in initial forces of Septal leads         Procedures/imaging: see electronic medical records for all procedures/Xrays and details which were not copied into this note but were reviewed prior to creation of Plan        Assessment/Plan     1. SIRS (Fever, tachycardia) poa - no clear infectious source at this time. CXR negative. Please follow UA, urine cx, blood cx. Continue abx at this time. 2. Orthostatic hypotension despite fluids - continue fluid resuscitation. 3. Atrial flutter s/p cardioversion per Dr. Alex Peralta. Continue propafenone and eliquis. 4. Falls at home. Fall precautions. Pt / ot.   5. Hyponatremia, chronic and at baseline. 6. Anemia normocytic normochromic  7. Echo 2/8/21 preserved EF, mild concentric hypertrophy, mild TR  8. Former tobacco  9. Hypothyroidism on Synthroid. 10.  Suspected dysphagia. Aspiration precautions. Consult SLP. 11. DVT prophylaxis with Eliquis  12. Full code. Admit to telemetry. Time spent 70 minutes.         Connor Cervantes MD  March 23, 2021

## 2021-03-24 NOTE — PROGRESS NOTES
Problem: Dysphagia (Adult)  Goal: *Acute Goals and Plan of Care (Insert Text)  Description: Patient will:  1. Tolerate PO trials with 0 s/s overt distress in 4/5 trials  2. Participate in training and education related to continued aspiration risk, diet recs and compensatory strategies   3. Perform oral-motor/laryngeal exercises to increase oropharyngeal swallow function with min cues  4. Complete an objective swallow study (i.e., MBSS) to assess swallow integrity, r/o aspiration, and determine of safest LRD, min A    Recommend:   Regular diet with thin liquids  Meds as tolerated   Aspiration precautions  HOB >45 degrees during all intake and for at least 30 min after po   Small bites/sips, Slow rate of intake   MBS to objectively assess oropharyngeal swallow integrity and rule out aspiration     Outcome: Progressing Towards Goal    SPEECH LANGUAGE PATHOLOGY BEDSIDE SWALLOW EVALUATION/TREATMENT    Patient: Modesta Mcneil (68 y.o. male)  Date: 3/24/2021  Primary Diagnosis: Fever [R50.9]  Orthostatic hypotension [I95.1]  Precautions: Aspiration      PLOF: As per H&P    ASSESSMENT :  Based on the objective data described below, the patient presents with suspected mild pharyngeal dysphagia. Pt alert and oriented x3, following commands and reporting \"I'm so weak I just can't do anything\". Pt reporting \"sometimes it gets in my larynx\" regarding drinking liquids. Oral mech exam unremarkable. Pt demo intermittent throat clear and wheezing prior to and during po trials. No improvement/change noted across consistency trials (thin, NTL or HTL). Pt with poor endurance and decreased laryngeal elevation to palpation. Demo positive rotary chew and thorough oral clearance. Recommend continue current diet with strict aspiration precautions and MBS to further assess oropharyngeal swallow integrity and rule out aspiration. Discussed with pt and RN.     TREATMENT :  Skilled therapy initiated; Educated pt on aspiration precautions and importance of compensatory swallow techniques to decrease aspiration risk (decrease rate of intake & sip/bite size, upright @HOB for all po intake and ~30 minutes after po); verbalized comprehension. Will follow for further dysphagia management. Patient will benefit from skilled intervention to address the above impairments. Patient's rehabilitation potential is considered to be Good  Factors which may influence rehabilitation potential include:   []            None noted  []            Mental ability/status  [x]            Medical condition  []            Home/family situation and support systems  []            Safety awareness  []            Pain tolerance/management  []            Other:      PLAN :  Recommendations and Planned Interventions:  As above   Frequency/Duration: Patient will be followed by speech-language pathology 1-2 times per day/4-7 days per week to address goals. Discharge Recommendations: To Be Determined     SUBJECTIVE:   Patient stated Addy Castro you call my wife and tell her I need to go somewhere for people that can't do anything    OBJECTIVE:     Past Medical History:   Diagnosis Date    Unspecified essential hypertension 7/19/2017     Past Surgical History:   Procedure Laterality Date    HX BACK SURGERY  1971     Prior Level of Function/Home Situation: Unknown   Diet prior to admission: Regular/thin liquids   Current Diet:  Regular/thin liquids    Cognitive and Communication Status:  Neurologic State: Alert  Orientation Level: Oriented to person, Oriented to place, Oriented to situation  Cognition: Follows commands  Oral Assessment:  Oral Assessment  Labial: No impairment  Dentition: Natural  Oral Hygiene: Good  Lingual: No impairment  Velum: No impairment  Mandible: No impairment  P.O. Trials:  Patient Position: 45 at [facility]  Vocal quality prior to P.O.: Low volume  Consistency Presented: Thin liquid; Solid; Nectar thick liquid;Honey thick liquid  How Presented: Self-fed/presented;Cup/sip;Straw;Successive swallows  Bolus Acceptance: No impairment  Bolus Formation/Control: No impairment  Propulsion: No impairment  Oral Residue: None  Initiation of Swallow: Delayed (# of seconds)  Laryngeal Elevation: Decreased;Weak  Aspiration Signs/Symptoms: Clear throat(Wheezing )  Pharyngeal Phase Characteristics: Suspected pharyngeal residue;Poor endurance;Easily fatigued   Effective Modifications: Small sips and bites  Cues for Modifications: Minimal  Oral Phase Severity: No impairment  Pharyngeal Phase Severity : Mild    PAIN:  Start of Eval: 0  End of Eval: 0     After treatment:   []            Patient left in no apparent distress sitting up in chair  [x]            Patient left in no apparent distress in bed  [x]            Call bell left within reach  [x]            Nursing notified  []            Family present  []            Caregiver present  []            Bed alarm activated    COMMUNICATION/EDUCATION:   [x]            Aspiration precautions; swallow safety; compensatory techniques. []            Patient/family have participated as able in goal setting and plan of care. [x]            Patient/family agree to work toward stated goals and plan of care. []            Patient understands intent and goals of therapy; neutral about participation. []            Patient unable to participate in goal setting/plan of care; educ ongoing with interdisciplinary staff  []         Posted safety precautions in patient's room.     Thank you for this referral,  BIRD RodriguezS., [id number] [facility]  Speech-Language Pathologist

## 2021-03-24 NOTE — PROGRESS NOTES
[facility]      CARDIOLOGY PROGRESS NOTE  RECS:  1. Atrial flutter, paroxysmal: Status post cardioversion 3/23/2021. Rhythm not very clear this morning but will hopefully become clear as the heart rate slows down with beta-blockers. Continue anticoagulation. Will plan to discontinue propafenone if he has gone back into atrial flutter. 2. 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 Watch on telemetry for any advanced AV blocks. 3. Congestive heart failure, chronic diastolic with acute decompensation this morning: Will not use Florinef as it will make it worse by retaining more fluid. As BP improved, discontinue IV fluids. Avoiding diuretics for now. 4. Orthostatic hypotension with history of falls in the last 1 week: Add midodrine. Thigh-high compression stockings. Check orthostatic changes 3 times a day while in hospital.  5. Fever: Possible sepsis. Work-up and treatment in progress per medicine. We will follow with you.       EKG Results     Procedure 720 Value Units Date/Time    EKG, 12 LEAD, INITIAL [[id number]] Collected: 03/24/21 0946    Order Status: Completed Updated: 03/24/21 0948     Ventricular Rate 108 BPM      Atrial Rate 117 BPM      QRS Duration 136 ms      Q-T Interval 366 ms      QTC Calculation (Bezet) 490 ms      Calculated R Axis -59 degrees      Calculated T Axis 17 degrees      Diagnosis --     Wide QRS rhythm  Left axis deviation  Right bundle branch block  Abnormal ECG  When compared with ECG of 23-MAR-2021 16:13,  Wide QRS rhythm has replaced Sinus rhythm      EKG, 12 LEAD, INITIAL [[id number]] Collected: 03/23/21 1613    Order Status: Completed Updated: 03/24/21 0630     Ventricular Rate 109 BPM      Atrial Rate 109 BPM      P-R Interval 256 ms      QRS Duration 142 ms      Q-T Interval 366 ms      QTC Calculation (Bezet) 492 ms      Calculated R Axis -59 degrees      Calculated T Axis 41 degrees      Diagnosis --     Sinus tachycardia with 1st degree AV block  Right bundle branch block  Left anterior fascicular block  Bifascicular block  Septal infarct (cited on or before 12-JUL-2010)  Abnormal ECG  When compared with ECG of 23-MAR-2021 10:04,  Questionable change in initial forces of Septal leads          XR Results (most recent):  Results from Hospital Encounter encounter on 03/23/21   XR CHEST PORT    Narrative INDICATION: lightheaded    TECHNIQUE: Portable chest radiograph    COMPARISON: CT chest 28 September 2018    FINDINGS: The lungs are adequately inflated. No focal consolidation, effusion or  pneumothorax. Heart size within normal limits. Multiple calcified pleural plaques. No acute osseous abnormality. Impression No acute cardiopulmonary findings. 02/08/21   ECHO ADULT COMPLETE 02/09/2021 2/9/2021    Narrative · LV: Estimated LVEF is 55 - 60%. Normal cavity size and systolic function   (ejection fraction normal). Mild concentric hypertrophy. Wall motion:   normal. Age-appropriate left ventricular diastolic function. · LA: Left Atrium volume index is 30.57 mL/m2. · RV: Normal right ventricular size and function. · TV: Mild tricuspid valve regurgitation is present. · PA: Pulmonary arterial systolic pressure is 34 mmHg. · MV: Mild mitral annular calcification. Signed by: Abimael Soto MD       02/08/21   NUCLEAR CARDIAC STRESS TEST 02/08/2021 2/8/2021    Narrative · Baseline ECG: Atrial fibrillation, atrial flutter, Nonspecific T wave   changes. · Negative stress test.  · Gated SPECT: Left ventricular function post-stress was normal.   Calculated ejection fraction is 76%. There is no evidence of transient   ischemic dilation (TID). The TID ratio is 1.5.   · Myocardial perfusion imaging supports a low risk stress test.  · Left ventricular perfusion is normal.        Signed by: Abimael Soto MD            ASSESSMENT:  Hospital Problems  Date Reviewed: 3/23/2021          Codes Class Noted POA Atrial flutter ([facility].) ICD-10-CM: I48.92  ICD-9-CM: 427.32  3/23/2021 Yes        * (Principal) Orthostatic hypotension ICD-10-CM: I95.1  ICD-9-CM: 458.0  3/23/2021 Unknown        Fever ICD-10-CM: R50.9  ICD-9-CM: 780.60  3/23/2021 Unknown        RBBB (right bundle branch block with left anterior fascicular block) ICD-10-CM: I45.2  ICD-9-CM: 426.52  9/13/2019 Yes        Acute on chronic diastolic congestive heart failure ([facility] ICD-10-CM: I50.33  ICD-9-CM: 428.33, 428.0  7/19/2017 No    Overview Addendum 8/31/2017 12:09 PM by Laurie Solomon MD     8/17 no edema today; 7/17 adv to stop all herbal OTC products for now                     SUBJECTIVE:  No chest pain  Complains of shortness of breath intermittently  Feels weak    OBJECTIVE:    VS:   Visit Vitals  /69   Pulse (!) 101   Temp 98.2 °F (36.8 °C)   Resp 23   Ht 6' 0.01\" (1.829 m)   Wt 93.9 kg (207 lb)   SpO2 96%   BMI 28.07 kg/m²       No intake or output data in the 24 hours ending 03/24/21 1002  TELE: Sinus tachycardia versus slow atrial flutter with 2-1 block    General: alert and in mild distress  HENT: Normocephalic, atraumatic. Normal external eye.   Eyelid edematous  Neck :  no bruit, no JVD  Cardiac:  regular rate and rhythm, tachycardia  Chest/Lungs:chest clear, no wheezing, rales, normal symmetric air entry-intermittent wheezing per nurse prompting swallow evaluation  Abdomen: Soft, nontender, no masses  Extremities: 1+ bilateral edema, peripheral pulses present      Labs: Results:       Chemistry Recent Labs     03/24/21  0701 03/23/21  1520 03/23/21  0754   GLU 82 116* 106*   * 130* 128*   K 3.8 4.4 4.0    100 96*   CO2 22 24 26   BUN 25* 27* 24*   CREA 0.87 0.99 1.05   CA 7.7* 8.2* 8.5   MG 2.0  --   --    PHOS 2.7  --   --    AGAP 7 6 6   BUCR 29* 27* 23*   AP  --  98  --    TP  --  6.4  --    ALB  --  3.1*  --    GLOB  --  3.3  --    AGRAT  --  0.9  --       CBC w/Diff Recent Labs     03/24/21  0701 03/23/21  1522 03/23/21  0754   WBC 6.5 5.6 5.5   RBC 3.11* 3.26* 3.43*   HGB 9.6* 10.1* 10.6*   HCT 28.6* 29.1* 30.8*    149 169   GRANS 65 80*  --    LYMPH 7* 7*  --    EOS 6* 5  --       Cardiac Enzymes Recent Labs     03/23/21  1520      CKND1 1.0      Coagulation Recent Labs     03/23/21  1520 03/23/21  0754   PTP 18.3* 20.2*   INR 1.6* 1.8*       Lipid Panel No results found for: CHOL, CHOLPOCT, CHOLX, CHLST, CHOLV, 808640, HDL, HDLP, LDL, LDLC, DLDLP, 321959, VLDLC, VLDL, TGLX, TRIGL, TRIGP, TGLPOCT, CHHD, CHHDX   BNP No results for input(s): BNPP in the last 72 hours. Liver Enzymes Recent Labs     03/23/21  1520   TP 6.4   ALB 3.1*   AP 98      Digoxin    Thyroid Studies No results found for: T4, T3U, TSH, TSHEXT           Yashira Fernandes MD     Contact numbers:   5 PM to 9 AM: Answering service at [phone]   9 AM to 5 PM: Pager [phone]; if no response, please call through answering service or office.   Office number is [phone] or [phone]

## 2021-03-24 NOTE — ROUTINE PROCESS
Received from ER to room 203. Awake and alert. Oriented to person, place and situation. Reoriented to time. HOB elevated. Telemetry applied. Denies cp or SOB at this time. Call bell at side. Will cont to monitor for any changes in status. 1300 called ER to see of UA and culture had been sent and if pt had been febrile. Pt stated he had wet himself down there and it was documented that pt had been inct . Nurse stated that he had not been febrile since admission. Will cont to monitor fpr continued elevated temps. 1330 pt attempted to use urinal and only a few drops came out. sts he needs to stand. Attempted to stand with assist of two and pt unable to stand enough to void. Agreeable to a condom cath. 1600 pt has still not voided. Taking in very little po. Given ginger ale and water to drink. 1830  pt sts he feels like he wants to try to turn to side to void. Assisted to turn to rt side. Condom cath on.  
 
1920 pt has still not voided. Bladder snac done and shows greater than 800. DR Sirisha Foley called and notified. Order received to place a saba. 1930 Bedside and Verbal shift change report given to Yennifer Brooks (oncoming nurse) by Dennis Dwyer RN (offgoing nurse). Report given with LOLLY, Cristopher and MAR.  
 
2000 #16 saba inserted w/o difficulty. 1000 cc of sharon urine obtained. UA and culture sent to lab. Pt tolerated well.

## 2021-03-24 NOTE — ROUTINE PROCESS
TRANSFER - OUT REPORT: 
 
Verbal report given to Sonia Nascimento on Carlos Dowling  being transferred to  for routine progression of care Report consisted of patients Situation, Background, Assessment and  
Recommendations(SBAR). Information from the following report(s) SBAR and ED Summary was reviewed with the receiving nurse. Lines:  
Peripheral IV 03/23/21 Right Antecubital (Active) Site Assessment Clean, dry, & intact 03/23/21 1184 Phlebitis Assessment 0 03/23/21 0824 Dressing Status Clean, dry, & intact 03/23/21 8000 Dressing Type Transparent 03/23/21 0824 Hub Color/Line Status Blue;Flushed;Patent;Capped 03/23/21 8993 Alcohol Cap Used Yes 03/23/21 1029 Opportunity for questions and clarification was provided. Patient transported with: 
 [employer]

## 2021-03-24 NOTE — PROGRESS NOTES
Hospitalist Progress Note             Date of Service:  3/24/2021  NAME:  Alvin Alonso  :  1935  MRN:  [id number]    Assessment/Plan      1. SIRS (Fever, tachycardia) poa - UTI  -CXR negative. -UA positive for UTI  - blood cx.- ngtd  - Continue abx at this time. - urine culture does not appear sent to me, will re-order stat  - please dc vanco/zosyn, as ceftriaxone is appropriate for now    2. Orthostatic hypotension despite fluids - continue fluid resuscitation, treat uti  3. Atrial flutter s/p cardioversion per Dr. Albert Lynch. Continue propafenone and eliquis. 4. Falls at home. Fall precautions. Pt / ot.   5. Hyponatremia, chronic and at baseline. 6. Anemia normocytic normochromic  7. Echo 21 preserved EF, mild concentric hypertrophy, mild TR  8. Former tobacco  9. Hypothyroidism on Synthroid. 10.  Suspected dysphagia. Aspiration precautions. Consult SLP. 11. DVT prophylaxis with Eliquis  12. Full code. Admit to telemetry. Hospital Problems  Date Reviewed: 3/23/2021          Codes Class Noted POA    Atrial flutter ([facility] [address] ICD-10-CM: I48.92  ICD-9-CM: 427.32  3/23/2021 Yes        * (Principal) Orthostatic hypotension ICD-10-CM: I95.1  ICD-9-CM: 458.0  3/23/2021 Unknown        Fever ICD-10-CM: R50.9  ICD-9-CM: 780.60  3/23/2021 Unknown        RBBB (right bundle branch block with left anterior fascicular block) ICD-10-CM: I45.2  ICD-9-CM: 426.52  2019 Yes        Acute on chronic diastolic congestive heart failure ([facility] [address] ICD-10-CM: I50.33  ICD-9-CM: 428.33, 428.0  2017 No    Overview Addendum 2017 12:09 PM by Abimael Soto MD      no edema today;  adv to stop all herbal OTC products for now                     Review of Systems:   A comprehensive review of systems was negative except for that written in the HPI.    Pt still with generalized weakness      Vital Signs:    Last 24hrs VS reviewed since prior progress note. Most recent are:  Visit Vitals  /68   Pulse 98   Temp 98.2 °F (36.8 °C)   Resp 18   Ht 6' 0.01\" (1.829 m)   Wt 93.9 kg (207 lb)   SpO2 100%   BMI 28.07 kg/m²       No intake or output data in the 24 hours ending 03/24/21 1053     Physical Examination:             General:          Alert, cooperative, no distress, appears stated age. Generalized weakness    HEENT:           Atraumatic, anicteric sclerae, pink conjunctivae                          No oral ulcers, mucosa moist, throat clear, dentition fair  Neck:               Supple, symmetrical  Lungs:             Clear to auscultation bilaterally. No Wheezing or Rhonchi. No rales. Chest wall:      No tenderness  No Accessory muscle use. Heart:              Regular  rhythm,  No  murmur   No edema  Abdomen:        Soft, non-tender. Not distended. Bowel sounds normal  Extremities:     No cyanosis. No clubbing,                            Skin turgor normal, Capillary refill normal  Skin:                Not pale. Not Jaundiced  No rashes   Psych:             Not anxious or agitated.   Neurologic:      Alert, moves all extremities, answers questions appropriately and responds to commands        Data Review:    Review and/or order of clinical lab test  Review and/or order of tests in the radiology section of CPT  Review and/or order of tests in the medicine section of CPT      Labs:     Recent Labs     03/24/21  0701 03/23/21  1520   WBC 6.5 5.6   HGB 9.6* 10.1*   HCT 28.6* 29.1*    149     Recent Labs     03/24/21  0701 03/23/21  1520 03/23/21  0754   * 130* 128*   K 3.8 4.4 4.0    100 96*   CO2 22 24 26   BUN 25* 27* 24*   CREA 0.87 0.99 1.05   GLU 82 116* 106*   CA 7.7* 8.2* 8.5   MG 2.0  --   --    PHOS 2.7  --   --      Recent Labs     03/23/21  1520   ALT 41   AP 98   TBILI 0.7   TP 6.4   ALB 3.1*   GLOB 3.3     Recent Labs     03/23/21  1520 03/23/21  0754   INR 1.6* 1.8*   PTP 18.3* 20.2* No results for input(s): FE, TIBC, PSAT, FERR in the last 72 hours. Lab Results   Component Value Date/Time    Folate >20.0 (H) 03/24/2021 07:01 AM      No results for input(s): PH, PCO2, PO2 in the last 72 hours.   Recent Labs     03/23/21  1520      TROIQ 0.03     No results found for: CHOL, CHOLX, CHLST, CHOLV, HDL, HDLP, LDL, LDLC, DLDLP, TGLX, TRIGL, TRIGP, CHHD, CHHDX  No results found for: [facility]  Lab Results   Component Value Date/Time    Color YELLOW 07/22/2020 01:42 AM    Appearance CLOUDY 07/22/2020 01:42 AM    Specific gravity 1.014 07/22/2020 01:42 AM    pH (UA) 5.0 07/22/2020 01:42 AM    Protein TRACE (A) 07/22/2020 01:42 AM    Glucose Negative 07/22/2020 01:42 AM    Ketone Negative 07/22/2020 01:42 AM    Bilirubin Negative 07/22/2020 01:42 AM    Urobilinogen 1.0 07/22/2020 01:42 AM    Nitrites Positive (A) 07/22/2020 01:42 AM    Leukocyte Esterase LARGE (A) 07/22/2020 01:42 AM    Epithelial cells Negative 07/22/2020 01:42 AM    Bacteria 4+ (A) 07/22/2020 01:42 AM    WBC TOO NUMEROUS TO COUNT 07/22/2020 01:42 AM    RBC 0 to 3 07/22/2020 01:42 AM         Medications Reviewed:     Current Facility-Administered Medications   Medication Dose Route Frequency    vancomycin (VANCOCIN) 1250 mg in  ml infusion  1,250 mg IntraVENous Q16H    midodrine (PROAMATINE) tablet 5 mg  5 mg Oral TID WITH MEALS    metoprolol tartrate (LOPRESSOR) tablet 25 mg  25 mg Oral BID    piperacillin-tazobactam (ZOSYN) 3.375 g in 0.9% sodium chloride (MBP/ADV) 100 mL MBP  3.375 g IntraVENous Q6H    propafenone (RYTHMOL) tablet 150 mg  150 mg Oral TID    apixaban (ELIQUIS) tablet 5 mg  5 mg Oral BID    cholecalciferol (VITAMIN D3) (1000 Units /25 mcg) tablet 1,000 Units  1,000 Units Oral DAILY    levothyroxine (SYNTHROID) tablet 25 mcg  25 mcg Oral ACB    timolol (TIMOPTIC) 0.25% ophthalmic solution  1 Drop Both Eyes BID    latanoprost (XALATAN) 0.005 % ophthalmic solution 1 Drop  1 Drop Both Eyes QHS    sodium chloride (NS) flush 5-40 mL  5-40 mL IntraVENous Q8H    sodium chloride (NS) flush 5-40 mL  5-40 mL IntraVENous PRN    acetaminophen (TYLENOL) tablet 650 mg  650 mg Oral Q6H PRN    Or    acetaminophen (TYLENOL) suppository 650 mg  650 mg Rectal Q6H PRN    polyethylene glycol (MIRALAX) packet 17 g  17 g Oral DAILY PRN    ondansetron (ZOFRAN) injection 4 mg  4 mg IntraVENous Q8H PRN     Current Outpatient Medications   Medication Sig    propafenone (RYTHMOL) 150 mg tablet Take 1 Tab by mouth three (3) times daily.  timolol (TIMOPTIC) 0.25 % ophthalmic solution Administer 1 Drop to both eyes two (2) times a day.  travoprost (TRAVATAN Z) 0.004 % ophthalmic solution Administer 1 Drop to both eyes every evening.  apixaban (ELIQUIS) 5 mg tablet Take 1 Tab by mouth two (2) times a day.  MILK THISTLE PO Take 250 mcg by mouth.  docusate sodium (STOOL SOFTENER PO) Take  by mouth.  levothyroxine (SYNTHROID) 25 mcg tablet Take 25 mcg by mouth Daily (before breakfast).  alfuzosin SR (UROXATRAL) 10 mg SR tablet Take  by mouth daily.  biotin 1,000 mcg chew Take  by mouth.  zinc 50 mg tab tablet Take  by mouth daily.  ASCORBATE CALCIUM (RUDY-C PO) Take  by mouth.  cholecalciferol (VITAMIN D3) 1,000 unit cap Take  by mouth daily.  co-enzyme Q-10 (CO Q-10) 100 mg capsule Take 100 mg by mouth daily.      ______________________________________________________________________  EXPECTED LENGTH OF STAY: - - -  ACTUAL LENGTH OF STAY:          1                 Garima Barreto MD

## 2021-03-24 NOTE — PROGRESS NOTES
Problem: Dysphagia (Adult)  Goal: *Acute Goals and Plan of Care (Insert Text)  Description: Patient will:  1. Tolerate PO trials with 0 s/s overt distress in 4/5 trials  2. Participate in training and education related to continued aspiration risk, diet recs and compensatory strategies   3. Perform oral-motor/laryngeal exercises to increase oropharyngeal swallow function with min cues  4. Complete an objective swallow study (i.e., MBSS) to assess swallow integrity, r/o aspiration, and determine of safest LRD, min A-met 3/24/21    Recommend:   Regular diet with honey thick liquids   Meds with honey thick liquids   Aspiration precautions  HOB >45 degrees during all intake and for at least 30 min after po   Small bites/sips, Slow rate of intake   Double swallow per bite/sip   Oral care three times daily   3/24/2021 1336 by Tasia Eden, SLP  Outcome: [address] STUDY [facility] TREATMENT    Patient: Bernabe Vega (81 y.o. male)  Date: 3/24/2021  Primary Diagnosis: Fever [R50.9]  Orthostatic hypotension [I95.1]  Precautions: Aspiration, Fall    ASSESSMENT :  Based on the objective data described below, the patient presents with mild oral and moderate pharyngeal dysphagia characterized by silent laryngeal penetration during and after the swallow with thin and nectar thick liquids. Pt able to tolerate honey thick liquids, pudding, regular solids and 13 mm Ba pill with honey thick liquid wash with positive airway protection noted across multiple trials. Deficits include decreased base of tongue strength with premature spillage and ~8 seconds of pooling prior to swallow initiation, decreased laryngeal elevation/adduction/sensation and incomplete epiglottic inversion. Chin tuck, effortful swallow and double swallow were ineffective at improving airway protection with thin and nectar thick liquids.   Double swallow improved pharyngeal clearance across all other consistencies. Recommend regular diet with honey thick liquids with meds honey thick liquids and use of the above mentioned compensatory strategies/aspiration precautions. TREATMENT :  Treatment provided post diagnostic testing including oropharyngeal anatomy/physiology, MBS results, diet recommendations and compensatory strategies/positioning. Pt able to verbalize understanding. Will continue to follow. Patient will benefit from skilled intervention to address the above impairments. Patient's rehabilitation potential is considered to be Good  Factors which may influence rehabilitation potential include:   []              None noted  []              Mental ability/status  [x]              Medical condition  []              Home/family situation and support systems  []              Safety awareness  []              Pain tolerance/management  []              Other:      PLAN :  Recommendations and Planned Interventions:  As above   Frequency/Duration: Patient will be followed by speech-language pathology 1-2 times per day/4-7 days per week to address goals. Discharge Recommendations:  To Be Determined     SUBJECTIVE:   Patient stated Thank you    OBJECTIVE:     Past Medical History:   Diagnosis Date    Unspecified essential hypertension 7/19/2017     Past Surgical History:   Procedure Laterality Date    HX BACK SURGERY  1971     Prior Level of Function/Home Situation:  Home Situation  Home Environment: Private residence  # Steps to Enter: 3  Rails to Enter: Yes  One/Two Story Residence: One story  Living Alone: No  Support Systems: Spouse/Significant Other/Partner  Patient Expects to be Discharged to[de-identified] Private residence  Current DME Used/Available at Home: liseth Rodríguez Brice Beady, straight  Diet prior to admission: Regular/thin liquids   Current Diet:  Regular/thin liquids; recommend change to regular/honey thick liquids    Radiologist:    Film Views: Fluoro;Lateral  Patient Position: 90 in chair    Trial 1: Trial 2:   Consistency Presented: Thin liquid; Nectar thick liquid Consistency Presented: Honey thick liquid;Pudding; Solid(13 mm Ba pill with HTL wash)   How Presented: Self-fed/presented;Cup/sip How Presented: Self-fed/presented;Cup/sip;Spoon; Successive swallows         Bolus Acceptance: No impairment Bolus Acceptance: No impairment   Bolus Formation/Control: Impaired: Premature spillage;Delayed Bolus Formation/Control: Impaired: Delayed;Poor;Premature spillage   Propulsion: Delayed (# of seconds) Propulsion: Delayed (# of seconds)     Oral Residue: None   Initiation of Swallow: Triggered at vallecula Initiation of Swallow: Triggered at valleculae   Timing: Pooling 6-10 sec;Vallecular Timing: Pooling 6-10 sec;Vallecular   Penetration: After swallow;During swallow; To laryngeal vestibule;From initial swallow;From residual Penetration: None   Aspiration/Timing: No evidence of aspiration Aspiration/Timing: No evidence of aspiration   Pharyngeal Clearance: Pyriform residue ;Vallecular residue;10-50% Pharyngeal Clearance: Vallecular residue;Pyriform residue ; Less than 10%   Attempted Modifications: Double swallow; Chin tuck;Effortful swallow;Small sips and bites Attempted Modifications: Double swallow;Effortful swallow   Effective Modifications: None Effective Modifications: (Double swallow)   Cues for Modifications: Minimal Cues for Modifications: Minimal             Decreased Tongue Base Retraction?: Yes  Laryngeal Elevation: Inadequate epiglottic inversion; Incomplete laryngeal closure  Aspiration/Penetration Score: 3 (Penetration/Visible residue-Contrast remains above the folds/cords, but is not cleared)  Pharyngeal Symmetry: Not assessed  Pharyngeal-Esophageal Segment: No impairment  Pharyngeal Dysfunction: Decreased strength;Decreased elevation/closure;Decreased tongue base retraction;Decreased pharyngeal wall constriction  Oral Phase Severity: Mild  Pharyngeal Phase Severity: Moderate    8-point Penetration-Aspiration Scale: Score 3    PAIN:  Pt reports 0/10 pain or discomfort prior to MBS. Pt reports 0/10 pain or discomfort post MBS. COMMUNICATION/EDUCATION:   [x]  Patient educated regarding MBS results and diet recommendations. []  Patient/family have participated as able in goal setting and plan of care. [x]  Patient/family agree to work toward stated goals and plan of care. []  Patient understands intent and goals of therapy, but is neutral about his/her participation. []  Patient is unable to participate in goal setting and plan of care.     Thank you for this referral,  Dalia Gleason M.S., [id number] [facility]  Speech-Language Pathologist

## 2021-03-25 ENCOUNTER — APPOINTMENT (OUTPATIENT)
Dept: GENERAL RADIOLOGY | Age: 86
DRG: 292 | End: 2021-03-25
Attending: NURSE PRACTITIONER
Payer: MEDICARE

## 2021-03-25 LAB
ANION GAP SERPL CALC-SCNC: 8 MMOL/L (ref 3–18)
APPEARANCE UR: CLEAR
ATRIAL RATE: 69 BPM
BACTERIA URNS QL MICRO: ABNORMAL /HPF
BASOPHILS # BLD: 0 K/UL (ref 0–0.1)
BASOPHILS NFR BLD: 0 % (ref 0–2)
BILIRUB UR QL: ABNORMAL
BNP SERPL-MCNC: 7476 PG/ML (ref 0–1800)
BUN SERPL-MCNC: 25 MG/DL (ref 7–18)
BUN/CREAT SERPL: 27 (ref 12–20)
CALCIUM SERPL-MCNC: 8.1 MG/DL (ref 8.5–10.1)
CALCULATED P AXIS, ECG09: 54 DEGREES
CALCULATED R AXIS, ECG10: -62 DEGREES
CALCULATED T AXIS, ECG11: 6 DEGREES
CHLORIDE SERPL-SCNC: 102 MMOL/L (ref 100–111)
CO2 SERPL-SCNC: 23 MMOL/L (ref 21–32)
COLOR UR: YELLOW
CREAT SERPL-MCNC: 0.94 MG/DL (ref 0.6–1.3)
DIAGNOSIS, 93000: NORMAL
DIFFERENTIAL METHOD BLD: ABNORMAL
EOSINOPHIL # BLD: 0.3 K/UL (ref 0–0.4)
EOSINOPHIL NFR BLD: 4 % (ref 0–5)
EPITH CASTS URNS QL MICRO: ABNORMAL /LPF (ref 0–5)
ERYTHROCYTE [DISTWIDTH] IN BLOOD BY AUTOMATED COUNT: 13.3 % (ref 11.6–14.5)
GLUCOSE SERPL-MCNC: 118 MG/DL (ref 74–99)
GLUCOSE UR STRIP.AUTO-MCNC: NEGATIVE MG/DL
GRAN CASTS URNS QL MICRO: ABNORMAL /LPF
HCT VFR BLD AUTO: 28.2 % (ref 36–48)
HGB BLD-MCNC: 9.8 G/DL (ref 13–16)
HGB UR QL STRIP: ABNORMAL
KETONES UR QL STRIP.AUTO: NEGATIVE MG/DL
LEUKOCYTE ESTERASE UR QL STRIP.AUTO: NEGATIVE
LYMPHOCYTES # BLD: 0.8 K/UL (ref 0.9–3.6)
LYMPHOCYTES NFR BLD: 10 % (ref 21–52)
MAGNESIUM SERPL-MCNC: 2 MG/DL (ref 1.6–2.6)
MCH RBC QN AUTO: 31.5 PG (ref 24–34)
MCHC RBC AUTO-ENTMCNC: 34.8 G/DL (ref 31–37)
MCV RBC AUTO: 90.7 FL (ref 74–97)
MONOCYTES # BLD: 0.7 K/UL (ref 0.05–1.2)
MONOCYTES NFR BLD: 9 % (ref 3–10)
NEUTS SEG # BLD: 6 K/UL (ref 1.8–8)
NEUTS SEG NFR BLD: 77 % (ref 40–73)
NITRITE UR QL STRIP.AUTO: NEGATIVE
P-R INTERVAL, ECG05: 290 MS
PH UR STRIP: 6 [PH] (ref 5–8)
PHOSPHATE SERPL-MCNC: 2.4 MG/DL (ref 2.5–4.9)
PLATELET # BLD AUTO: 142 K/UL (ref 135–420)
PMV BLD AUTO: 9.7 FL (ref 9.2–11.8)
POTASSIUM SERPL-SCNC: 4 MMOL/L (ref 3.5–5.5)
PROT UR STRIP-MCNC: 100 MG/DL
Q-T INTERVAL, ECG07: 438 MS
QRS DURATION, ECG06: 158 MS
QTC CALCULATION (BEZET), ECG08: 469 MS
RBC # BLD AUTO: 3.11 M/UL (ref 4.7–5.5)
RBC #/AREA URNS HPF: ABNORMAL /HPF (ref 0–5)
SODIUM SERPL-SCNC: 133 MMOL/L (ref 136–145)
SP GR UR REFRACTOMETRY: 1.03 (ref 1–1.03)
UROBILINOGEN UR QL STRIP.AUTO: 0.2 EU/DL (ref 0.2–1)
VENTRICULAR RATE, ECG03: 69 BPM
WBC # BLD AUTO: 7.8 K/UL (ref 4.6–13.2)
WBC URNS QL MICRO: ABNORMAL /HPF (ref 0–4)

## 2021-03-25 PROCEDURE — 97535 SELF CARE MNGMENT TRAINING: CPT

## 2021-03-25 PROCEDURE — 74011250637 HC RX REV CODE- 250/637: Performed by: HOSPITALIST

## 2021-03-25 PROCEDURE — 80048 BASIC METABOLIC PNL TOTAL CA: CPT

## 2021-03-25 PROCEDURE — 74011000250 HC RX REV CODE- 250: Performed by: INTERNAL MEDICINE

## 2021-03-25 PROCEDURE — 36415 COLL VENOUS BLD VENIPUNCTURE: CPT

## 2021-03-25 PROCEDURE — 83735 ASSAY OF MAGNESIUM: CPT

## 2021-03-25 PROCEDURE — 97110 THERAPEUTIC EXERCISES: CPT

## 2021-03-25 PROCEDURE — 99232 SBSQ HOSP IP/OBS MODERATE 35: CPT | Performed by: INTERNAL MEDICINE

## 2021-03-25 PROCEDURE — 97166 OT EVAL MOD COMPLEX 45 MIN: CPT

## 2021-03-25 PROCEDURE — 92526 ORAL FUNCTION THERAPY: CPT

## 2021-03-25 PROCEDURE — 74011250637 HC RX REV CODE- 250/637: Performed by: NURSE PRACTITIONER

## 2021-03-25 PROCEDURE — P9047 ALBUMIN (HUMAN), 25%, 50ML: HCPCS | Performed by: NURSE PRACTITIONER

## 2021-03-25 PROCEDURE — 65660000000 HC RM CCU STEPDOWN

## 2021-03-25 PROCEDURE — 87086 URINE CULTURE/COLONY COUNT: CPT

## 2021-03-25 PROCEDURE — 84100 ASSAY OF PHOSPHORUS: CPT

## 2021-03-25 PROCEDURE — 81001 URINALYSIS AUTO W/SCOPE: CPT

## 2021-03-25 PROCEDURE — 93005 ELECTROCARDIOGRAM TRACING: CPT

## 2021-03-25 PROCEDURE — 83880 ASSAY OF NATRIURETIC PEPTIDE: CPT

## 2021-03-25 PROCEDURE — 71045 X-RAY EXAM CHEST 1 VIEW: CPT

## 2021-03-25 PROCEDURE — 74011250636 HC RX REV CODE- 250/636: Performed by: NURSE PRACTITIONER

## 2021-03-25 PROCEDURE — 77030012890

## 2021-03-25 PROCEDURE — 74011250636 HC RX REV CODE- 250/636: Performed by: INTERNAL MEDICINE

## 2021-03-25 PROCEDURE — 74011250637 HC RX REV CODE- 250/637: Performed by: INTERNAL MEDICINE

## 2021-03-25 PROCEDURE — 97116 GAIT TRAINING THERAPY: CPT

## 2021-03-25 PROCEDURE — 2709999900 HC NON-CHARGEABLE SUPPLY

## 2021-03-25 PROCEDURE — 85025 COMPLETE CBC W/AUTO DIFF WBC: CPT

## 2021-03-25 PROCEDURE — 97530 THERAPEUTIC ACTIVITIES: CPT

## 2021-03-25 RX ORDER — ALBUMIN HUMAN 250 G/1000ML
25 SOLUTION INTRAVENOUS ONCE
Status: COMPLETED | OUTPATIENT
Start: 2021-03-25 | End: 2021-03-25

## 2021-03-25 RX ORDER — MIDODRINE HYDROCHLORIDE 5 MG/1
10 TABLET ORAL
Status: DISCONTINUED | OUTPATIENT
Start: 2021-03-25 | End: 2021-03-29

## 2021-03-25 RX ORDER — ALBUMIN HUMAN 250 G/1000ML
25 SOLUTION INTRAVENOUS ONCE
Status: DISCONTINUED | OUTPATIENT
Start: 2021-03-25 | End: 2021-03-25

## 2021-03-25 RX ADMIN — PROPAFENONE HYDROCHLORIDE 150 MG: 150 TABLET, FILM COATED ORAL at 21:11

## 2021-03-25 RX ADMIN — MIDODRINE HYDROCHLORIDE 10 MG: 5 TABLET ORAL at 12:11

## 2021-03-25 RX ADMIN — ALBUMIN (HUMAN) 25 G: 0.25 INJECTION, SOLUTION INTRAVENOUS at 12:11

## 2021-03-25 RX ADMIN — APIXABAN 5 MG: 5 TABLET, FILM COATED ORAL at 09:27

## 2021-03-25 RX ADMIN — Medication 10 ML: at 00:46

## 2021-03-25 RX ADMIN — Medication 10 ML: at 05:27

## 2021-03-25 RX ADMIN — APIXABAN 5 MG: 5 TABLET, FILM COATED ORAL at 21:11

## 2021-03-25 RX ADMIN — Medication 10 ML: at 21:12

## 2021-03-25 RX ADMIN — PROPAFENONE HYDROCHLORIDE 150 MG: 150 TABLET, FILM COATED ORAL at 00:46

## 2021-03-25 RX ADMIN — PROPAFENONE HYDROCHLORIDE 150 MG: 150 TABLET, FILM COATED ORAL at 09:28

## 2021-03-25 RX ADMIN — MIDODRINE HYDROCHLORIDE 10 MG: 5 TABLET ORAL at 17:32

## 2021-03-25 RX ADMIN — METOPROLOL TARTRATE 25 MG: 25 TABLET, FILM COATED ORAL at 09:28

## 2021-03-25 RX ADMIN — PROPAFENONE HYDROCHLORIDE 150 MG: 150 TABLET, FILM COATED ORAL at 17:32

## 2021-03-25 RX ADMIN — Medication 10 ML: at 12:51

## 2021-03-25 RX ADMIN — MIDODRINE HYDROCHLORIDE 5 MG: 5 TABLET ORAL at 09:27

## 2021-03-25 RX ADMIN — Medication 1000 UNITS: at 09:28

## 2021-03-25 RX ADMIN — WATER 1 G: 1 INJECTION INTRAMUSCULAR; INTRAVENOUS; SUBCUTANEOUS at 12:11

## 2021-03-25 RX ADMIN — LEVOTHYROXINE SODIUM 25 MCG: 25 TABLET ORAL at 09:28

## 2021-03-25 NOTE — PROGRESS NOTES
Problem: Dysphagia (Adult)  Goal: *Acute Goals and Plan of Care (Insert Text)  Description: Patient will:  1. Tolerate PO trials with 0 s/s overt distress in 4/5 trials  2. Participate in training and education related to continued aspiration risk, diet recs and compensatory strategies   3. Perform oral-motor/laryngeal exercises to increase oropharyngeal swallow function with min cues  4. Complete an objective swallow study (i.e., MBSS) to assess swallow integrity, r/o aspiration, and determine of safest LRD, min A-met 3/24/21    Recommend:   Regular diet with honey thick liquids   Meds as tolerated   Aspiration precautions  HOB >45 degrees during all intake and for at least 30 min after po   Small bites/sips, Slow rate of intake   Double swallow per bite/sip   Oral care three times daily   Outcome: Progressing Towards Goal     SPEECH LANGUAGE PATHOLOGY DYSPHAGIA TREATMENT    Patient: Tosin Mora (19 y.o. male)  Date: 3/25/2021  Diagnosis: Fever [R50.9]  Orthostatic hypotension [I95.1] Orthostatic hypotension  Precautions: Fall, Aspiration  PLOF: Regular diet with thin liquids     ASSESSMENT:  Pt seen for follow up dysphagia treatment. Pt alert and oriented x3 and following commands. Pt able to recall completion of MBS previous date but unable to recall results. Reeducated with regard to results of MBS, aspiration risk/precautions, diet recs, oral care and positioning. Pt verbalizing understanding; however, demo poor carryover/decreased insight into deficits. Pt tolerating HTL and regular solids with no overt s/sx aspiration. Declining pharyngeal strengthening exercises despite mod encouragement. Will continue to follow for further dysphagia management as indicated. D/w pt and RN.   Progression toward goals:  []         Improving appropriately and progressing toward goals  [x]         Improving slowly and progressing toward goals  []         Not making progress toward goals and plan of care will be adjusted PLAN:  Recommendations and Planned Interventions:  Patient continues to benefit from skilled intervention to address the above impairments. Continue treatment per established plan of care. Discharge Recommendations: To Be Determined     SUBJECTIVE:   Patient stated I need something to drink\"    OBJECTIVE:   Cognitive and Communication Status:  Neurologic State: Alert  Orientation Level: Oriented to person, Oriented to place, Oriented to situation  Cognition: Follows commands  Perception: Appears intact  Perseveration: No perseveration noted  Safety/Judgement: Fall prevention  Dysphagia Treatment:  Oral Assessment:  Oral Assessment  Labial: No impairment  Dentition: Natural  Oral Hygiene: Good  Lingual: No impairment  Velum: No impairment  Mandible: No impairment  P.O. Trials:   Patient Position: [facility] at [facility]   Vocal quality prior to P.O.: Low volume   Consistency Presented: Honey thick liquid, Solid   How Presented: Self-fed/presented, Cup/sip, Successive swallows   Bolus Acceptance: No impairment   Bolus Formation/Control: No impairment   Propulsion: No impairment   Oral Residue: None   Initiation of Swallow: No impairment   Laryngeal Elevation: Decreased   Aspiration Signs/Symptoms: None   Pharyngeal Phase Characteristics: Poor endurance   Effective Modifications: Small sips and bites   Cues for Modifications:  Moderate   Oral Phase Severity: No impairment   Pharyngeal Phase Severity : Mild     PAIN:  Start of Tx: 0  End of Tx: 0     After treatment:   []              Patient left in no apparent distress sitting up in chair  [x]              Patient left in no apparent distress in bed  [x]              Call bell left within reach  [x]              Nursing notified  []              Family present  []              Caregiver present  []              Bed alarm activated      COMMUNICATION/EDUCATION:   [x] Aspiration precautions; swallow safety; compensatory techniques  [x]        Patient/family able to participate in training and education   []  Patient unable to participate in training and education, education ongoing with staff   [] Patient understands goals and intent of therapy; neutral about participation     Alejandra Asher M.S., [address] [facility]  Speech-Language Pathologist

## 2021-03-25 NOTE — PROGRESS NOTES
Received report on pt.from off going RN. Resting quietly in bed on rounds. Denies c/o pain or SOB at this time. No acute distress noted. Call bell at side. HOB elevated. Side rails up x 3. Will cont to monitor for any changes in status. 1400 pts appetite very poor. Upset because liquids are thickened. Explained to pt the importance of the thickened liquids. Son  was updated on pts condition. 1600 continues to ask for ice cream. wanting to speak with someone about getting his food he wants. Dietary consult placed. 1700 sitting up in bed to eat. Denies distress. Bedside and Verbal shift change report given to Lona White (oncoming nurse) by Katih Chavez RN (offgoing nurse). Report given with Cristopher AMBROCIO and MAR.

## 2021-03-25 NOTE — PROGRESS NOTES
Progress Note      Patient: Pancho Caba               Sex: male          DOA: 3/23/2021       YOB: 1935      Age:  81 y.o.        LOS:  LOS: 2 days             CHIEF COMPLAINT:  Orthostatic hypotension in the setting of chronic diastolic CHF and recent Cardioversion    Subjective:     Patient is sitting up  He feels weak  No chest pain or SOB    Objective:      Visit Vitals  /61   Pulse 71   Temp 98.1 °F (36.7 °C)   Resp 16   Ht 6' 0.01\" (1.829 m)   Wt 99.4 kg (219 lb 3.2 oz)   SpO2 100%   BMI 29.72 kg/m²       Physical Exam:  Gen:  Patient is in no distress. No complaint  HEENT and Neck:  PERRL, No cervical tenderness or masses  Lungs:  Clear bilaterally. No rales, no wheeze. Normal effort. Heart:  Regular Rate and Rhythm. No murmur or gallop. No JVD. No edema. Abdomen:  Soft, non tender, no masses, no Hepatosplenomegally  Extremities:  No digital clubbing, no cyanosis  Neuro:  Alert and oriented, Normal affect, Cranial nerves intact, No sensory deficits        Lab/Data Reviewed:  BMP:   Lab Results   Component Value Date/Time     (L) 03/25/2021 01:50 AM    K 4.0 03/25/2021 01:50 AM     03/25/2021 01:50 AM    CO2 23 03/25/2021 01:50 AM    AGAP 8 03/25/2021 01:50 AM     (H) 03/25/2021 01:50 AM    BUN 25 (H) 03/25/2021 01:50 AM    CREA 0.94 03/25/2021 01:50 AM    GFRAA >60 03/25/2021 01:50 AM    GFRNA >60 03/25/2021 01:50 AM     CBC:   Lab Results   Component Value Date/Time    WBC 7.8 03/25/2021 01:50 AM    HGB 9.8 (L) 03/25/2021 01:50 AM    HCT 28.2 (L) 03/25/2021 01:50 AM     03/25/2021 01:50 AM           Assessment/Plan     1. SIRS (Fever, tachycardia) poa - UTI  -CXR negative. -UA positive for UTI   Patient remains on antibiotics   Following urine culture which is negative thus far    2. Orthostatic hypotension despite fluids - continue fluid resuscitation, treat uti   Midodrine increased. Beta blocker held, following BP with increased Midodrine    3. Atrial flutter s/p cardioversion per Dr. Holloway Collar. Continue propafenone and eliquis. 4. Falls at home. Fall precautions. Pt / ot.   5. Hyponatremia, chronic and at baseline. 6. Anemia normocytic normochromic  7. Echo 2/8/21 preserved EF, mild concentric hypertrophy, mild TR  8. Former tobacco  9.  Hypothyroidism on Synthroid. 10.  Suspected dysphagia.  Aspiration precautions.  Consult SLP.   11.  DVT prophylaxis with Eliquis  12.  Full code.  Admit to telemetry.

## 2021-03-25 NOTE — CONSULTS
Comprehensive Nutrition Assessment    Type and Reason for Visit: Initial, Consult    Nutrition Recommendations/Plan:   - Add supplements: Magic Cup, TID  - Monitor and encourage po intake as tolerated. Nutrition Assessment:  Admitted with orthostatic hypotension. Regular diet with honey thickened liquids per SLP s/p MBS 3/24. Pt with poor po intake reporting decreased appetite and pt unable to provide food preferences. Asking for ice cream and discussed inability to have regular ice cream- agreeable & excited for Magic Cup. Malnutrition Assessment:  Malnutrition Status: At risk for malnutrition (specify)(decreased appetite since admission)      Nutrition History and Allergies: PMHx- HTN. Pt reports good po intake PTA but per chart son voicing concerns about pts decline over the past few weeks with lethargy and inability to ambulate. Pt reports UBW of 205#. Allergy to seasonale. Estimated Daily Nutrient Needs:  Energy (kcal): 0001-3676; Weight Used for Energy Requirements: Current  Protein (g): 79-99; Weight Used for Protein Requirements: Current(0.8-1)  Fluid (ml/day): 3430-6849; Method Used for Fluid Requirements: 1 ml/kcal    Nutrition Related Findings:  BM 3/25. Miralax prn given yesterday. Vitamin D3. Wounds:    None       Current Nutrition Therapies:  DIET CARDIAC Regular; 3 Honey/3 Moderately Thick  DIET NUTRITIONAL SUPPLEMENTS Breakfast, Lunch, Dinner, All Meals; Magic Cups    Anthropometric Measures:  · Height:  6' 0.01\" (182.9 cm)  · Current Body Wt:  99.4 kg (219 lb 2.2 oz)   · Admission Body Wt:  219 lb 2.2 oz    · Usual Body Wt:  93 kg (205 lb)     · Ideal Body Wt:  178 lbs:  123.1 %   · BMI Category: Overweight (BMI 25.0-29. 9)       Nutrition Diagnosis:   · Inadequate oral intake related to (decreased appetite) as evidenced by intake 0-25%    Nutrition Interventions:   Food and/or Nutrient Delivery: Continue current diet, Start oral nutrition supplement, Vitamin supplement  Nutrition Education and Counseling: Education not indicated  Coordination of Nutrition Care: Continue to monitor while inpatient, Speech therapy(pt discussed with RN & CNA)    Goals:  PO nutrition intake will meet >75% of patient estimated nutritional needs within the next 7 days. Nutrition Monitoring and Evaluation:   Behavioral-Environmental Outcomes: None identified  Food/Nutrient Intake Outcomes: Diet advancement/tolerance, Food and nutrient intake, Supplement intake, Vitamin/mineral intake  Physical Signs/Symptoms Outcomes: Biochemical data, Chewing or swallowing, Meal time behavior, Nutrition focused physical findings    Discharge Planning:     Too soon to determine     Electronically signed by Bety Welch RD on 3/25/2021 at 4:09 PM    Contact: [phone]

## 2021-03-25 NOTE — PROGRESS NOTES
conducted an initial consultation and Spiritual Assessment for Marylee Edwards, who is a 81 y.o.,male. Patient's Primary Language is: [place]. According to the patient's EMR Denominational Affiliation is: [facility]. 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 Explored issues of dony, belief, spirituality and Taoism/ritual needs while hospitalized. Listened empathically. Provided chaplaincy education. Provided information about Spiritual Care Services. Offered prayer and assurance of continued prayers on patient's behalf. Chart reviewed. The following outcomes where achieved:  Patient shared limited information about both their medical narrative and spiritual journey/beliefs.  confirmed Patient's Denominational Affiliation. Patient expressed gratitude for 's visit. Assessment:  Patient does not have any Taoism/cultural needs that will affect patient's preferences in health care. There are no spiritual or Taoism issues which require intervention at this time. Plan:  Chaplains will continue to follow and will provide pastoral care on an as needed/requested basis.  recommends bedside caregivers page  on duty if patient shows signs of acute spiritual or emotional distress.     [facility]   [phone]

## 2021-03-25 NOTE — PROGRESS NOTES
Problem: Self Care Deficits Care Plan (Adult)  Goal: *Acute Goals and Plan of Care (Insert Text)  Description: Occupational Therapy Goals  Initiated 3/25/2021 within 7 day(s). 1.  Patient will perform grooming/functional activity standing for 3-5 minutes if BP permits with modified independence, F+ balance. 2.  Patient will perform upper body dressing with modified independence. 3.  Patient will perform lower body dressing with moderate assistance using AE prn.  4.  Patient will perform toilet transfers with minimal assistance/contact guard assist.  5.  Patient will perform all aspects of toileting with moderate assistance . 6. Patient will participate in upper extremity therapeutic exercise/activities with modified independence for 8 minutes. 7.  Patient will utilize energy conservation techniques during functional activities with verbal cues. Prior Level of Function: Patient needed assist with LB self-care from wife for past month. Prior til month ago, patient was independent with tasks. Patient uses rollator for functional mobility PTA. Outcome: Progressing Towards Goal       OCCUPATIONAL THERAPY EVALUATION    Patient: Juan Bonilla (30 y.o. male)  Date: 3/25/2021  Primary Diagnosis: Fever [R50.9]  Orthostatic hypotension [I95.1]  Precautions:  Fall, Aspiration    ASSESSMENT :  Patient cleared to participate in OT evaluation by RN. Upon entering the room, the patient was supine in bed, alert, and agreeable to participate in OT evaluation. Patient was seen with PT to maximize patient safety, participation, and functional mobility in preparation for self-care tasks. Orthostatics assessed this session and patient's BP supine 110/68, sitting EOB 97/62, unable to read standing despite 2 attempts and once seated 92/57. Patient O2 remained above 98% this session on RA and HR WNL. Patient required moderate motivation/encouragement this session 2/2 unpleasant mood.  Based on the objective data described below, the patient presents with decreased strength, decreased independence, decreased  safety awareness, decreased functional balance, and decreased functional mobility, which impedes pt performance in basic self-care/ADL tasks. Patient would benefit from skilled OT to restore PLOF and maximize function. Patient will benefit from skilled intervention to address the above impairments. Patient's rehabilitation potential is considered to be Fair  Factors which may influence rehabilitation potential include:     []             None noted  [x]             Mental ability/status  [x]             Medical condition  [x]             Home/family situation and support systems  [x]             Safety awareness  []             Pain tolerance/management  []             Other:      PLAN :  Recommendations and Planned Interventions:   [x]               Self Care Training                  [x]      Therapeutic Activities  [x]               Functional Mobility Training   []      Cognitive Retraining  [x]               Therapeutic Exercises           [x]      Endurance Activities  [x]               Balance Training                    [x]      Neuromuscular Re-Education  []               Visual/Perceptual Training     [x]      Home Safety Training  [x]               Patient Education                   [x]      Family Training/Education  []               Other (comment):    Frequency/Duration: Patient will be followed by occupational therapy 1-2 times per day/4-7 days per week to address goals. Discharge Recommendations: Zia Keita  Further Equipment Recommendations for Discharge: rolling walker     SUBJECTIVE:   Patient stated im not doing well today.  I dont think I did good    OBJECTIVE DATA SUMMARY:     Past Medical History:   Diagnosis Date    Unspecified essential hypertension 7/19/2017     Past Surgical History:   Procedure Laterality Date    HX BACK SURGERY  1971    AR CARDIOVERSION ELECTIVE ARRHYTHMIA EXTERNAL N/A 3/23/2021    EP CARDIOVERSION performed by Ravindra Mark MD at [address] [facility]     Barriers to Learning/Limitations: None  Compensate with: visual, verbal, tactile, kinesthetic cues/model    Home Situation:   Home Situation  Home Environment: Private residence  # Steps to Enter: 3  Rails to Enter: Yes  One/Two Story Residence: One story  Living Alone: No  Support Systems: Spouse/Significant Other/Partner  Patient Expects to be Discharged to[de-identified] Private residence  Current DME Used/Available at Home: Walker, rolling, Lift chair  Tub or Shower Type: Tub/Shower combination  [x]  Right hand dominant   []  Left hand dominant    Cognitive/Behavioral Status:  Neurologic State: Alert  Orientation Level: Oriented to person;Oriented to place;Oriented to situation  Cognition: Follows commands  Safety/Judgement: Fall prevention    Skin: Intact  Edema: None noted    Vision/Perceptual:                           Acuity: Within Defined Limits         Coordination: BUE     Fine Motor Skills-Upper: Left Intact; Right Intact    Gross Motor Skills-Upper: Left Intact; Right Intact    Balance:  Sitting: Intact  Sitting - Static: Good (unsupported)  Sitting - Dynamic: Good (unsupported)  Standing: Impaired; With support  Standing - Static: Fair  Standing - Dynamic : Fair;Occasional    Strength: BUE    Strength: Generally decreased, functional                Tone & Sensation: BUE    Tone: Normal  Sensation: Intact                      Range of Motion: BUE    AROM: Within functional limits                         Functional Mobility and Transfers for ADLs:  Bed Mobility:     Supine to Sit: Moderate assistance;Bed Modified(vcs for sequencing/easy technique)     Scooting:  Moderate assistance(towards EOB; R hip)  Transfers:  Sit to Stand: Minimum assistance;Assist x2(assist for holding RW as pt pulled to stand initially)  Stand to Sit: Minimum assistance;Assist x2(vcs for reaching back prior to sitting)                Toilet Transfer : Minimum assistance;Assist x2(BSC)                ADL Assessment:   Feeding: Setup;Stand-by assistance    Oral Facial Hygiene/Grooming: Setup;Stand-by assistance    Bathing: Maximum assistance    Upper Body Dressing: Stand-by assistance    Lower Body Dressing: Maximum assistance    Toileting: Maximum assistance                ADL Intervention:  Feeding  Feeding Assistance: Set-up; Stand-by assistance  Drink to Mouth: Stand-by assistance    Grooming  Grooming Assistance: Set-up; Stand-by assistance  Washing Hands: Stand-by assistance(using saniwipe)  Brushing Teeth: Set-up; Stand-by assistance(mirror on tray table for visual feedback)              Upper Body Dressing Assistance  Dressing Assistance: Stand-by assistance  Shirt simulation with hospital gown: Stand-by assistance         Toileting  Toileting Assistance: Maximum assistance  Bowel Hygiene: Maximum assistance    Cognitive Retraining  Safety/Judgement: Fall prevention    Pain:  Pain level pre-treatment: 0/10   Pain level post-treatment: 0/10   Pain Intervention(s): Medication (see MAR); Response to intervention: Nurse notified, See doc flow    Activity Tolerance:   Fair    Please refer to the flowsheet for vital signs taken during this treatment. After treatment:   [x] Patient left in no apparent distress sitting up in chair  [] Patient left in no apparent distress in bed  [x] Call bell left within reach  [x] Nursing notified  [x] Cardiologist present  [x]  alarm activated    COMMUNICATION/EDUCATION:   [x] Role of Occupational Therapy in the acute care setting  [x] Home safety education was provided and the patient/caregiver indicated understanding. [x] Patient/family have participated as able in goal setting and plan of care. [x] Patient/family agree to work toward stated goals and plan of care. [] Patient understands intent and goals of therapy, but is neutral about his/her participation.   [] Patient is unable to participate in goal setting and plan of care. Thank you for this referral.  Paula Holbrook, OTR/L  Time Calculation: 43 mins    Eval Complexity: History: MEDIUM Complexity : Expanded review of history including physical, cognitive and psychosocial  history ; Examination: HIGH Complexity : 5 or more performance deficits relating to physical, cognitive , or psychosocial skils that result in activity limitations and / or participation restrictions; Decision Making:MEDIUM Complexity : Patient may present with comorbidities that affect occupational performnce.  Miniml to moderate modification of tasks or assistance (eg, physical or verbal ) with assesment(s) is necessary to enable patient to complete evaluation

## 2021-03-25 NOTE — PROGRESS NOTES
Problem: Mobility Impaired (Adult and Pediatric)  Goal: *Acute Goals and Plan of Care (Insert Text)  Description: Physical Therapy Goals  Initiated 3/24/2021 and to be accomplished within 7 day(s)  1. Patient will move from supine to sit and sit to supine  in bed with minimal assistance/contact guard assist.    2.  Patient will transfer from bed to chair and chair to bed with minimal assistance/contact guard assist using the least restrictive device. 3.  Patient will perform sit to stand with minimal assistance/contact guard assist.  4.  Patient will ambulate with minimal assistance/contact guard assist for 25 feet with the least restrictive device. PLOF: Poor historian. Reports unable to walk past 2-3 weeks. Outcome: Progressing Towards Goal   PHYSICAL THERAPY TREATMENT    Patient: Darshana Lawson (80 y.o. male)  Date: 3/25/2021  Diagnosis: Fever [R50.9]  Orthostatic hypotension [I95.1] Orthostatic hypotension       Precautions: Fall, Aspiration    ASSESSMENT:  Patient is cleared by nursing for PT, and patient consents to therapy. Performed co-treatment with occupational therapy to increase participation, safety, and functional mobility. Pt required moderate assistance for supine to sit with cues for sequencing. Sit to stands moderate assistance/minimal assistance x2 with cues to push up vs pulling to stand. Practiced 3 stands. Gait total 6 feet including 2 feet lateral, 2 feet to BSC and 2 feet to chair with rolling walker minimal assistance x2. Pt has weakness noted with decreased step length and decreased foot clearance. Cues for upright posture during standing. Performed therex for B LE ROM/strengthening to increase safety with functional mobility including bed mobility, transfers, and gait. Pt ended therapy sitting in recliner with alarm donned and all needs met.      Vitals:   -supine /68, HR 70, SpO2 98% room air  -sitting BP 97/62, HR 73, SpO2 96% room air; lightheaded  -standing would not take initially  -after standing 92/57, HR 91, SpO2 99% room air    Progression toward goals:    [x]      Improving appropriately and progressing toward goals  []      Improving slowly and progressing toward goals  []      Not making progress toward goals and plan of care will be adjusted     PLAN:  Patient continues to benefit from skilled intervention to address the above impairments. Continue treatment per established plan of care. Discharge Recommendations:  Rehab  Further Equipment Recommendations for Discharge:  bedside commode, shower chair, and rolling walker     SUBJECTIVE:   Patient stated Ute Smart.     OBJECTIVE DATA SUMMARY:   Critical Behavior:  Neurologic State: Alert  Orientation Level: Oriented to person, Oriented to place, Oriented to situation  Cognition: Follows commands  Safety/Judgement: Fall prevention  Functional Mobility Training:  Bed Mobility:     Supine to Sit: Moderate assistance;Bed Modified(vcs for sequencing/easy technique)     Scooting: Moderate assistance(towards EOB; R hip)         Transfers:  Sit to Stand: Minimum assistance;Assist x2(assist for holding RW as pt pulled to stand initially)  Stand to Sit: Minimum assistance;Assist x2(vcs for reaching back prior to sitting)                             Balance:  Sitting: Intact  Sitting - Static: Good (unsupported)  Sitting - Dynamic: Good (unsupported)  Standing: Impaired; With support  Standing - Static: Fair  Standing - Dynamic : Fair;Occasional     Ambulation/Gait Training:  Distance (ft): 6 Feet (ft)  Assistive Device: Walker, rolling  Ambulation - Level of Assistance: Minimal assistance;Assist x2        Gait Abnormalities: Decreased step clearance        Base of Support: Center of gravity altered     Speed/Manda: Slow  Step Length: Right shortened;Left shortened          Therapeutic Exercises:   Reviewed and performed ankle pumps to increase blood flow and circulation.       Constant cues during exercises      EXERCISE   Sets Reps   Active Active Assist   Passive Self ROM   Comments   Ankle Pumps 1 10  [x] [] [] []    Quad Sets and Glut Sets 1 10  [x] [] [] [] Hold for 5 secs   Hamstring Sets   [] [] [] []    Short Arc Quads   [] [] [] []    Heel Slides   [] [] [] []    Straight Leg Raises   [] [] [] []    Hip Abd/adduction   [] [] [] []    Long Arc Quads   [] [] [] []    Seated Marching   [] [] [] []    Standing Marching   [] [] [] []       [] [] [] []        Pain:  No pain noted before, during, or at end of session. Activity Tolerance:   fair  Please refer to the flowsheet for vital signs taken during this treatment. After treatment:   [x] Patient left in no apparent distress sitting up in chair  [] Patient left in no apparent distress in bed  [x] Call bell left within reach  [x] Nursing notified Belinda  [x] Personal items in reach  [] Caregiver present  [x] Bed/chair alarm activated  [] SCDs applied      COMMUNICATION/EDUCATION:   [x]         Role of Physical Therapy in the acute care setting. [x]         Fall prevention education was provided and the patient/caregiver indicated understanding. [x]         Patient/family have participated as able in working toward goals and plan of care. [x]         Patient/family agree to work toward stated goals and plan of care. []         Patient understands intent and goals of therapy, but is neutral about his/her participation. []         Patient is unable to participate in stated goals/plan of care: ongoing with therapy staff. [x]         Out of bed at least 3-5 times a day with nursing assistance.    []         Other:        Nereida Zuniga, PT, DPT   Time Calculation: 45 mins

## 2021-03-25 NOTE — PROGRESS NOTES
Discharge/Transition Planning    Spoke with patient about SNF rehab based on therapy evals. Pt stated he doesn't think it will do any good or make a difference as he has ongoing issues with arthritis and pain and cant do the exercises. He states he will just go home and hire a helper. Encouraged pt to think about and discuss the SNF rehab option with family.  Encouraged pt to work hard with therapy as being able to ambulate at least a few steps would be beneficial if going home to avoid fall and injury      Hernando Gracia RN BSN  Outcomes Manager    Pager # [phone]

## 2021-03-25 NOTE — PROGRESS NOTES
[facility]      CARDIOLOGY PROGRESS NOTE  RECS:      1. Atrial flutter, paroxysmal: Status post cardioversion 3/23/2021. Maintaining NSR Continue with propafenone and  anticoagulation. Will plan to discontinue propafenone if he has gone back into atrial flutter. 2. 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 Watch on telemetry for any advanced AV blocks-stable   3. Congestive heart failure, chronic diastolic-mildly  decompensated now. ivf d/c. Monitor for s/s acute decompensation. 4. Orthostatic hypotension with history of falls for 1 week- remains with significant orthostatics changes and symptomatic-hold metoprolol. increased midodrine to 10 mg po TID. Added albumin bolus. Continue to Check orthostatic  3 times a day. Added compression stockings. 5. Fever: Possible sepsis. Work-up and treatment in progress per medicine. 6. Dysphagia- being follow by ST on honey thick liquids and aspiration precautions   7.  UTI/urinary retention- requiring indwelling catheter- tx per medical team           EKG Results     Procedure 720 Value Units Date/Time    EKG, 12 LEAD, SUBSEQUENT [[id number]] Collected: 03/25/21 1034    Order Status: Completed Updated: 03/25/21 1039     Ventricular Rate 69 BPM      Atrial Rate 69 BPM      P-R Interval 290 ms      QRS Duration 158 ms      Q-T Interval 438 ms      QTC Calculation (Bezet) 469 ms      Calculated P Axis 54 degrees      Calculated R Axis -62 degrees      Calculated T Axis 6 degrees      Diagnosis --     Sinus rhythm with 1st degree AV block  Right bundle branch block  Left anterior fascicular block  Bifascicular block  Abnormal ECG  When compared with ECG of 24-MAR-2021 09:46,  Previous ECG has undetermined rhythm, needs review  Left anterior fascicular block is now present      EKG, 12 LEAD, INITIAL [[id number]] Collected: 03/24/21 0946    Order Status: Completed Updated: 03/24/21 1328     Ventricular Rate 108 BPM Atrial Rate 117 BPM      QRS Duration 136 ms      Q-T Interval 366 ms      QTC Calculation (Bezet) 490 ms      Calculated R Axis -59 degrees      Calculated T Axis 17 degrees      Diagnosis --     Suspect sinus tach with RBBB  Left axis deviation  Abnormal ECG  When compared with ECG of 23-MAR-2021 16:13,  No significant change was found  Confirmed by Chaparro Major M.D., [facility], [place] ([id number]) on 3/24/2021 1:28:24 PM      EKG, 12 LEAD, INITIAL [[id number]] Collected: 03/23/21 1613    Order Status: Completed Updated: 03/24/21 1244     Ventricular Rate 109 BPM      Atrial Rate 109 BPM      P-R Interval 256 ms      QRS Duration 142 ms      Q-T Interval 366 ms      QTC Calculation (Bezet) 492 ms      Calculated R Axis -59 degrees      Calculated T Axis 41 degrees      Diagnosis --     Sinus tachycardia with 1st degree AV block  Right bundle branch block  Left anterior fascicular block  Bifascicular block  Septal infarct (cited on or before 12-JUL-2010)  Abnormal ECG  When compared with ECG of 23-MAR-2021 10:04,  Questionable change in initial forces of Septal leads  Confirmed by Chaparro Major M.D., [facility], [place], [facility] ([id number]) on 3/24/2021 12:43:58 PM          XR Results (most recent):      02/08/21   ECHO ADULT COMPLETE 02/09/2021 2/9/2021    Narrative · LV: Estimated LVEF is 55 - 60%. Normal cavity size and systolic function   (ejection fraction normal). Mild concentric hypertrophy. Wall motion:   normal. Age-appropriate left ventricular diastolic function. · LA: Left Atrium volume index is 30.57 mL/m2. · RV: Normal right ventricular size and function. · TV: Mild tricuspid valve regurgitation is present. · PA: Pulmonary arterial systolic pressure is 34 mmHg. · MV: Mild mitral annular calcification. Signed by: Seth Cifuentes MD       02/08/21   NUCLEAR CARDIAC STRESS TEST 02/08/2021 2/8/2021    Narrative · Baseline ECG: Atrial fibrillation, atrial flutter, Nonspecific T wave   changes.   · Negative stress test.  · Gated SPECT: Left ventricular function post-stress was normal.   Calculated ejection fraction is 76%. There is no evidence of transient   ischemic dilation (TID). The TID ratio is 1.5. · Myocardial perfusion imaging supports a low risk stress test.  · Left ventricular perfusion is normal.        Signed by: Trino Alexander MD            ASSESSMENT:  Hospital Problems  Date Reviewed: 3/23/2021          Codes Class Noted POA    Atrial flutter ([facility] ICD-10-CM: Y53.09  ICD-9-CM: 427.32  3/23/2021 Yes        * (Principal) Orthostatic hypotension ICD-10-CM: I95.1  ICD-9-CM: 458.0  3/23/2021 Unknown        Fever ICD-10-CM: R50.9  ICD-9-CM: 780.60  3/23/2021 Unknown        RBBB (right bundle branch block with left anterior fascicular block) ICD-10-CM: I45.2  ICD-9-CM: 426.52  9/13/2019 Yes        Acute on chronic diastolic congestive heart failure ([facility] ICD-10-CM: I50.33  ICD-9-CM: 428.33, 428.0  7/19/2017 No    Overview Addendum 8/31/2017 12:09 PM by Trino Alexander MD     8/17 no edema today; 7/17 adv to stop all herbal OTC products for now                     SUBJECTIVE:  No chest pain  SOB is present worse with exertion   Feels weak but some what better     OBJECTIVE:    VS:   Visit Vitals  /61   Pulse 71   Temp 98.1 °F (36.7 °C)   Resp 16   Ht 6' 0.01\" (1.829 m)   Wt 99.4 kg (219 lb 3.2 oz)   SpO2 100%   BMI 29.72 kg/m²         Intake/Output Summary (Last 24 hours) at 3/25/2021 1103  Last data filed at 3/25/2021 0347  Gross per 24 hour   Intake 300 ml   Output 1200 ml   Net -900 ml     TELE: NSR     General: alert and in mild distress  HENT: Normocephalic, atraumatic. Normal external eye. Eyelid edematous  Neck :  no bruit, no JVD  Cardiac:  regular rate and rhythm, tachycardia  Chest/Lungs:chest clear, no wheezing, rales.    Abdomen: Soft, nontender, no masses  Extremities: no bilateral edema, peripheral pulses present      Labs: Results:       Chemistry Recent Labs     03/25/21  0150 03/24/21  0701 03/23/21  1520   GLU 118* 82 116*   * 134* 130*   K 4.0 3.8 4.4    105 100   CO2 23 22 24   BUN 25* 25* 27*   CREA 0.94 0.87 0.99   CA 8.1* 7.7* 8.2*   MG 2.0 2.0  --    PHOS 2.4* 2.7  --    AGAP 8 7 6   BUCR 27* 29* 27*   AP  --   --  98   TP  --   --  6.4   ALB  --   --  3.1*   GLOB  --   --  3.3   AGRAT  --   --  0.9      CBC w/Diff Recent Labs     03/25/21  0150 03/24/21  0701 03/23/21  1520   WBC 7.8 6.5 5.6   RBC 3.11* 3.11* 3.26*   HGB 9.8* 9.6* 10.1*   HCT 28.2* 28.6* 29.1*    146 149   GRANS 77* 65 80*   LYMPH 10* 7* 7*   EOS 4 6* 5      Cardiac Enzymes Recent Labs     03/23/21  1520      CKND1 1.0      Coagulation Recent Labs     03/23/21  1520 03/23/21  0754   PTP 18.3* 20.2*   INR 1.6* 1.8*       Lipid Panel No results found for: CHOL, CHOLPOCT, CHOLX, CHLST, CHOLV, 295770, HDL, HDLP, LDL, LDLC, DLDLP, 743357, VLDLC, VLDL, TGLX, TRIGL, TRIGP, TGLPOCT, CHHD, CHHDX   BNP No results for input(s): BNPP in the last 72 hours. Liver Enzymes Recent Labs     03/23/21  1520   TP 6.4   ALB 3.1*   AP 98      Digoxin    Thyroid Studies No results found for: T4, T3U, TSH, TSHEXT, TSHEXT           DOUGIE Thomas   Supervised    I have independently evaluated and examined the patient. All relevant labs and testing data's are reviewed. Care plan discussed and updated after review.     Claudia Ortiz MD

## 2021-03-26 ENCOUNTER — APPOINTMENT (OUTPATIENT)
Dept: NON INVASIVE DIAGNOSTICS | Age: 86
DRG: 292 | End: 2021-03-26
Attending: NURSE PRACTITIONER
Payer: MEDICARE

## 2021-03-26 LAB
ANION GAP SERPL CALC-SCNC: 9 MMOL/L (ref 3–18)
BACTERIA SPEC CULT: NORMAL
BUN SERPL-MCNC: 28 MG/DL (ref 7–18)
BUN/CREAT SERPL: 27 (ref 12–20)
CALCIUM SERPL-MCNC: 8.5 MG/DL (ref 8.5–10.1)
CHLORIDE SERPL-SCNC: 101 MMOL/L (ref 100–111)
CO2 SERPL-SCNC: 22 MMOL/L (ref 21–32)
CREAT SERPL-MCNC: 1.03 MG/DL (ref 0.6–1.3)
ERYTHROCYTE [DISTWIDTH] IN BLOOD BY AUTOMATED COUNT: 13.3 % (ref 11.6–14.5)
GLUCOSE SERPL-MCNC: 130 MG/DL (ref 74–99)
HCT VFR BLD AUTO: 27.7 % (ref 36–48)
HGB BLD-MCNC: 9.4 G/DL (ref 13–16)
MCH RBC QN AUTO: 30.8 PG (ref 24–34)
MCHC RBC AUTO-ENTMCNC: 33.9 G/DL (ref 31–37)
MCV RBC AUTO: 90.8 FL (ref 74–97)
PLATELET # BLD AUTO: 180 K/UL (ref 135–420)
PMV BLD AUTO: 10.1 FL (ref 9.2–11.8)
POTASSIUM SERPL-SCNC: 3.9 MMOL/L (ref 3.5–5.5)
RBC # BLD AUTO: 3.05 M/UL (ref 4.7–5.5)
SERVICE CMNT-IMP: NORMAL
SODIUM SERPL-SCNC: 132 MMOL/L (ref 136–145)
WBC # BLD AUTO: 7 K/UL (ref 4.6–13.2)

## 2021-03-26 PROCEDURE — 74011250636 HC RX REV CODE- 250/636: Performed by: HOSPITALIST

## 2021-03-26 PROCEDURE — 74011250636 HC RX REV CODE- 250/636: Performed by: INTERNAL MEDICINE

## 2021-03-26 PROCEDURE — 74011250636 HC RX REV CODE- 250/636: Performed by: NURSE PRACTITIONER

## 2021-03-26 PROCEDURE — 74011250637 HC RX REV CODE- 250/637: Performed by: HOSPITALIST

## 2021-03-26 PROCEDURE — 80048 BASIC METABOLIC PNL TOTAL CA: CPT

## 2021-03-26 PROCEDURE — 92526 ORAL FUNCTION THERAPY: CPT

## 2021-03-26 PROCEDURE — 74011000250 HC RX REV CODE- 250: Performed by: HOSPITALIST

## 2021-03-26 PROCEDURE — P9047 ALBUMIN (HUMAN), 25%, 50ML: HCPCS | Performed by: NURSE PRACTITIONER

## 2021-03-26 PROCEDURE — 74011250637 HC RX REV CODE- 250/637: Performed by: NURSE PRACTITIONER

## 2021-03-26 PROCEDURE — 36415 COLL VENOUS BLD VENIPUNCTURE: CPT

## 2021-03-26 PROCEDURE — 85027 COMPLETE CBC AUTOMATED: CPT

## 2021-03-26 PROCEDURE — 93321 DOPPLER ECHO F-UP/LMTD STD: CPT

## 2021-03-26 PROCEDURE — 2709999900 HC NON-CHARGEABLE SUPPLY

## 2021-03-26 PROCEDURE — 65660000000 HC RM CCU STEPDOWN

## 2021-03-26 PROCEDURE — 99232 SBSQ HOSP IP/OBS MODERATE 35: CPT | Performed by: INTERNAL MEDICINE

## 2021-03-26 PROCEDURE — 74011000250 HC RX REV CODE- 250: Performed by: INTERNAL MEDICINE

## 2021-03-26 RX ORDER — ALBUMIN HUMAN 250 G/1000ML
25 SOLUTION INTRAVENOUS ONCE
Status: COMPLETED | OUTPATIENT
Start: 2021-03-26 | End: 2021-03-26

## 2021-03-26 RX ORDER — ALBUMIN HUMAN 250 G/1000ML
25 SOLUTION INTRAVENOUS ONCE
Status: DISCONTINUED | OUTPATIENT
Start: 2021-03-26 | End: 2021-03-26

## 2021-03-26 RX ADMIN — APIXABAN 5 MG: 5 TABLET, FILM COATED ORAL at 09:00

## 2021-03-26 RX ADMIN — LEVOTHYROXINE SODIUM 25 MCG: 25 TABLET ORAL at 08:57

## 2021-03-26 RX ADMIN — PERFLUTREN 2 ML: 6.52 INJECTION, SUSPENSION INTRAVENOUS at 14:21

## 2021-03-26 RX ADMIN — Medication 10 ML: at 16:46

## 2021-03-26 RX ADMIN — TIMOLOL MALEATE 1 DROP: 2.5 SOLUTION/ DROPS OPHTHALMIC at 16:42

## 2021-03-26 RX ADMIN — Medication 10 ML: at 05:06

## 2021-03-26 RX ADMIN — MIDODRINE HYDROCHLORIDE 10 MG: 5 TABLET ORAL at 11:43

## 2021-03-26 RX ADMIN — MIDODRINE HYDROCHLORIDE 10 MG: 5 TABLET ORAL at 08:57

## 2021-03-26 RX ADMIN — Medication 1000 UNITS: at 09:00

## 2021-03-26 RX ADMIN — LATANOPROST 1 DROP: 50 SOLUTION OPHTHALMIC at 22:53

## 2021-03-26 RX ADMIN — PROPAFENONE HYDROCHLORIDE 150 MG: 150 TABLET, FILM COATED ORAL at 21:57

## 2021-03-26 RX ADMIN — MIDODRINE HYDROCHLORIDE 10 MG: 5 TABLET ORAL at 16:41

## 2021-03-26 RX ADMIN — APIXABAN 5 MG: 5 TABLET, FILM COATED ORAL at 21:57

## 2021-03-26 RX ADMIN — ACETAMINOPHEN 650 MG: 325 TABLET ORAL at 18:18

## 2021-03-26 RX ADMIN — TIMOLOL MALEATE 1 DROP: 2.5 SOLUTION/ DROPS OPHTHALMIC at 21:58

## 2021-03-26 RX ADMIN — PROPAFENONE HYDROCHLORIDE 150 MG: 150 TABLET, FILM COATED ORAL at 08:56

## 2021-03-26 RX ADMIN — WATER 1 G: 1 INJECTION INTRAMUSCULAR; INTRAVENOUS; SUBCUTANEOUS at 10:49

## 2021-03-26 RX ADMIN — Medication 10 ML: at 22:12

## 2021-03-26 RX ADMIN — ALBUMIN (HUMAN) 25 G: 0.25 INJECTION, SOLUTION INTRAVENOUS at 11:41

## 2021-03-26 RX ADMIN — PROPAFENONE HYDROCHLORIDE 150 MG: 150 TABLET, FILM COATED ORAL at 16:41

## 2021-03-26 NOTE — PROGRESS NOTES
[facility]      CARDIOLOGY PROGRESS NOTE  RECS:      1. Atrial flutter, paroxysmal: Status post cardioversion 3/23/2021. Maintaining NSR Continue with propafenone and  anticoagulation. 2. 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 Watch on telemetry for any advanced AV blocks-stable   3. Congestive heart failure, chronic diastolic-mildly  Decompensated. Still c/o SOB. No significant peripheral edema. NT pro BNP >7000 -limited echo shows EF 55%  4. Orthostatic hypotension with history of falls for 1 week- no orthostatic changes today-continue to hold metoprolol. Continue midodrine 10 mg po TID. albumin bolus x 1 today Continue to Check orthostatic  3 times a day. Continue compression stockings. 5. Fever: Possible sepsis. Work-up and treatment in progress per medicine. 6. Dysphagia- being follow by ST on honey thick liquids and aspiration precautions   7.  UTI/urinary retention- requiring indwelling catheter- tx per medical team           EKG Results     Procedure 720 Value Units Date/Time    EKG, 12 LEAD, SUBSEQUENT [[id number]] Collected: 03/25/21 1034    Order Status: Completed Updated: 03/25/21 1732     Ventricular Rate 69 BPM      Atrial Rate 69 BPM      P-R Interval 290 ms      QRS Duration 158 ms      Q-T Interval 438 ms      QTC Calculation (Bezet) 469 ms      Calculated P Axis 54 degrees      Calculated R Axis -62 degrees      Calculated T Axis 6 degrees      Diagnosis --     Sinus rhythm with 1st degree AV block  Right bundle branch block  Left anterior fascicular block  Bifascicular block  Abnormal ECG  When compared with ECG of 24-MAR-2021 09:46,  No significant change was found  Confirmed by Patric Singletary MD, Aris Mcneil ([id number]) on 3/25/2021 5:32:10 PM      EKG, 12 LEAD, INITIAL [[id number]] Collected: 03/24/21 0946    Order Status: Completed Updated: 03/24/21 1328     Ventricular Rate 108 BPM      Atrial Rate 117 BPM      QRS Duration 136 ms      Q-T Interval 366 ms      QTC Calculation (Bezet) 490 ms      Calculated R Axis -59 degrees      Calculated T Axis 17 degrees      Diagnosis --     Suspect sinus tach with RBBB  Left axis deviation  Abnormal ECG  When compared with ECG of 23-MAR-2021 16:13,  No significant change was found  Confirmed by Frannie Moncada M.D., [address] [place] [address] [id number]) on 3/24/2021 1:28:24 PM      EKG, 12 LEAD, INITIAL [[id number]] Collected: 03/23/21 1613    Order Status: Completed Updated: 03/24/21 1244     Ventricular Rate 109 BPM      Atrial Rate 109 BPM      P-R Interval 256 ms      QRS Duration 142 ms      Q-T Interval 366 ms      QTC Calculation (Bezet) 492 ms      Calculated R Axis -59 degrees      Calculated T Axis 41 degrees      Diagnosis --     Sinus tachycardia with 1st degree AV block  Right bundle branch block  Left anterior fascicular block  Bifascicular block  Septal infarct (cited on or before 12-JUL-2010)  Abnormal ECG  When compared with ECG of 23-MAR-2021 10:04,  Questionable change in initial forces of Septal leads  Confirmed by Frannie Moncada M.D., [address] [place] [address] [id number]) on 3/24/2021 12:43:58 PM          XR Results (most recent):      02/08/21   ECHO ADULT COMPLETE 02/09/2021 2/9/2021    Narrative · LV: Estimated LVEF is 55 - 60%. Normal cavity size and systolic function   (ejection fraction normal). Mild concentric hypertrophy. Wall motion:   normal. Age-appropriate left ventricular diastolic function. · LA: Left Atrium volume index is 30.57 mL/m2. · RV: Normal right ventricular size and function. · TV: Mild tricuspid valve regurgitation is present. · PA: Pulmonary arterial systolic pressure is 34 mmHg. · MV: Mild mitral annular calcification. Signed by: Thelma Alamo MD       02/08/21   NUCLEAR CARDIAC STRESS TEST 02/08/2021 2/8/2021    Narrative · Baseline ECG: Atrial fibrillation, atrial flutter, Nonspecific T wave   changes.   · Negative stress test.  · Gated SPECT: Left ventricular function post-stress was normal.   Calculated ejection fraction is 76%. There is no evidence of transient   ischemic dilation (TID). The TID ratio is 1.5. · Myocardial perfusion imaging supports a low risk stress test.  · Left ventricular perfusion is normal.        Signed by: Dione Wei MD            ASSESSMENT:  Hospital Problems  Date Reviewed: 3/23/2021          Codes Class Noted POA    Atrial flutter ([facility] ICD-10-CM: F94.96  ICD-9-CM: 427.32  3/23/2021 Yes        * (Principal) Orthostatic hypotension ICD-10-CM: I95.1  ICD-9-CM: 458.0  3/23/2021 Unknown        Fever ICD-10-CM: R50.9  ICD-9-CM: 780.60  3/23/2021 Unknown        RBBB (right bundle branch block with left anterior fascicular block) ICD-10-CM: I45.2  ICD-9-CM: 426.52  9/13/2019 Yes        Acute on chronic diastolic congestive heart failure ([facility] ICD-10-CM: I50.33  ICD-9-CM: 428.33, 428.0  7/19/2017 No    Overview Addendum 8/31/2017 12:09 PM by Dione Wei MD     8/17 no edema today; 7/17 adv to stop all herbal OTC products for now                     SUBJECTIVE:  No chest pain  SOB is present worse with exertion   Feels weak and no improvement of his symptoms     OBJECTIVE:    VS:   Visit Vitals  /68 (BP 1 Location: Left upper arm, BP Patient Position: At rest)   Pulse 78   Temp 97.6 °F (36.4 °C)   Resp 18   Ht 6' 0.01\" (1.829 m)   Wt 98.2 kg (216 lb 6.4 oz)   SpO2 98%   BMI 29.34 kg/m²         Intake/Output Summary (Last 24 hours) at 3/26/2021 1154  Last data filed at 3/26/2021 0258  Gross per 24 hour   Intake 1080 ml   Output 600 ml   Net 480 ml     TELE: NSR     General: alert and in mild distress  HENT: Normocephalic, atraumatic. Normal external eye. Eyelid edematous  Neck :  no bruit, no JVD  Cardiac:  regular rate and rhythm, tachycardia  Chest/Lungs:chest clear, no wheezing, rales.    Abdomen: Soft, nontender, no masses  Extremities: no bilateral edema, peripheral pulses present      Labs: Results:       Chemistry Recent Labs     03/26/21  0034 03/25/21  0150 03/24/21  0701 03/23/21  1520   * 118* 82 116*   * 133* 134* 130*   K 3.9 4.0 3.8 4.4    102 105 100   CO2 22 23 22 24   BUN 28* 25* 25* 27*   CREA 1.03 0.94 0.87 0.99   CA 8.5 8.1* 7.7* 8.2*   MG  --  2.0 2.0  --    PHOS  --  2.4* 2.7  --    AGAP 9 8 7 6   BUCR 27* 27* 29* 27*   AP  --   --   --  98   TP  --   --   --  6.4   ALB  --   --   --  3.1*   GLOB  --   --   --  3.3   AGRAT  --   --   --  0.9      CBC w/Diff Recent Labs     03/26/21  0034 03/25/21  0150 03/24/21  0701 03/23/21  1520   WBC 7.0 7.8 6.5 5.6   RBC 3.05* 3.11* 3.11* 3.26*   HGB 9.4* 9.8* 9.6* 10.1*   HCT 27.7* 28.2* 28.6* 29.1*    142 146 149   GRANS  --  77* 65 80*   LYMPH  --  10* 7* 7*   EOS  --  4 6* 5      Cardiac Enzymes Recent Labs     03/23/21  1520      CKND1 1.0      Coagulation Recent Labs     03/23/21  1520   PTP 18.3*   INR 1.6*       Lipid Panel No results found for: CHOL, CHOLPOCT, CHOLX, CHLST, CHOLV, 392237, HDL, HDLP, LDL, LDLC, DLDLP, 183102, VLDLC, VLDL, TGLX, TRIGL, TRIGP, TGLPOCT, CHHD, CHHDX   BNP No results for input(s): BNPP in the last 72 hours. Liver Enzymes Recent Labs     03/23/21  1520   TP 6.4   ALB 3.1*   AP 98      Digoxin    Thyroid Studies No results found for: T4, T3U, TSH, TSHEXT, TSHEXT           DOUGIE Thomas   Supervised    I have independently evaluated and examined the patient. All relevant labs and testing data's are reviewed. Care plan discussed and updated after review.     William Freeman MD

## 2021-03-26 NOTE — ROUTINE PROCESS
Bedside shift change report given to Oleksandr Madrigal (oncoming nurse) by Briana Sheriff, RN and Jeovany Montes RN (offgoing nurse). Report included the following information SBAR, Kardex, Intake/Output and Recent Results.

## 2021-03-26 NOTE — PROGRESS NOTES
Progress Note      Patient: Leopold Li               Sex: male          DOA: 3/23/2021       YOB: 1935      Age:  81 y.o.        LOS:  LOS: 3 days             CHIEF COMPLAINT:  Weakness with improved hypotension after cardioversion    Subjective:     Patient acknowledges that he is probably too weak to go home at this point  No chest pain  No SOB    Objective:      Visit Vitals  /68   Pulse 78   Temp 97.6 °F (36.4 °C)   Resp 18   Ht 6' (1.829 m)   Wt 98 kg (216 lb)   SpO2 98%   BMI 29.29 kg/m²       Physical Exam:  Gen:  No distress, no complaint  Lungs:  Clear bilaterally, no wheeze or rhonchi  Heart:  Regular rate and rhythm, no murmurs or gallops  Abdomen:  Soft, non-tender, normal bowel sounds        Lab/Data Reviewed:  BMP:   Lab Results   Component Value Date/Time     (L) 03/26/2021 12:34 AM    K 3.9 03/26/2021 12:34 AM     03/26/2021 12:34 AM    CO2 22 03/26/2021 12:34 AM    AGAP 9 03/26/2021 12:34 AM     (H) 03/26/2021 12:34 AM    BUN 28 (H) 03/26/2021 12:34 AM    CREA 1.03 03/26/2021 12:34 AM    GFRAA >60 03/26/2021 12:34 AM    GFRNA >60 03/26/2021 12:34 AM     CBC:   Lab Results   Component Value Date/Time    WBC 7.0 03/26/2021 12:34 AM    HGB 9.4 (L) 03/26/2021 12:34 AM    HCT 27.7 (L) 03/26/2021 12:34 AM     03/26/2021 12:34 AM           Assessment/Plan     Principal Problem:    Orthostatic hypotension (3/23/2021)    Active Problems:    Acute on chronic diastolic congestive heart failure (HCC) (7/19/2017)      Overview: 8/17 no edema today; 7/17 adv to stop all herbal OTC products for now      RBBB (right bundle branch block with left anterior fascicular block) (9/13/2019)      Atrial flutter ([address] (3/23/2021)      Fever (3/23/2021)        Plan:  BP appears improved  Monitor heart rhythm. Continue with PT. Patient is willing to go to SNF after DC at this point.

## 2021-03-26 NOTE — PROGRESS NOTES
Pt noted to have low-grade fever of 100.5. Tylenol given. Pt also had 4 episodes of diarrhea/incontinence. Dr. Sheryl Willams notified. Will send stool sample for C.diff. Pt otherwise asymptomatic and in no obvious distress at this time.

## 2021-03-26 NOTE — PROGRESS NOTES
Discharge/Transition Planning    Pt continues to refuse SNF rehab. Plan is Home and [facility]. High probability of readmit and fall as taking 2 staff to stand currently.        Albaro Andrea RN BSN  Outcomes Manager    Pager # [phone]

## 2021-03-26 NOTE — PROGRESS NOTES
1200 Took patient's blood pressure. Patient cannot tolerate to stand so I was not able to get his blood pressure while standing.   Lying down- 133/84  Sitting- 132/ 84

## 2021-03-26 NOTE — PROGRESS NOTES
Problem: Dysphagia (Adult)  Goal: *Acute Goals and Plan of Care (Insert Text)  Description: Patient will:  1. Tolerate PO trials with 0 s/s overt distress in 4/5 trials  2. Participate in training and education related to continued aspiration risk, diet recs and compensatory strategies   3. Perform oral-motor/laryngeal exercises to increase oropharyngeal swallow function with min cues  4. Complete an objective swallow study (i.e., MBSS) to assess swallow integrity, r/o aspiration, and determine of safest LRD, min A-met 3/24/21    Recommend:   Regular diet with honey thick liquids   Meds as tolerated   Aspiration precautions  HOB >45 degrees during all intake and for at least 30 min after po   Small bites/sips, Slow rate of intake   Double swallow per bite/sip   Oral care three times daily   Outcome: Progressing Towards Goal    SPEECH LANGUAGE PATHOLOGY DYSPHAGIA TREATMENT    Patient: Paul Heck (64 y.o. male)  Date: 3/26/2021  Diagnosis: Fever [R50.9]  Orthostatic hypotension [I95.1] Orthostatic hypotension       Precautions:  Fall, Aspiration  PLOF: Per H&P    ASSESSMENT:  Pt seen at bedside for dysphagia tx/ management. SLP facilitated effortful/ re-swallow with Honey thick liquids x10 reps with min cues; no overt s/sx aspiration appreciated. Additionally, airway protection ex of hard /k/ words facilitated with min modeling x25 reps. Pt educated re: diagnosis, diet recs, and purpose/benefit of ex. Pt agreeable. ST will continue to follow. Rec: Continue current diet.       Progression toward goals:  []         Improving appropriately and progressing toward goals  [x]         Improving slowly and progressing toward goals  []         Not making progress toward goals and plan of care will be adjusted     PLAN:  Recommendations and Planned Interventions:  Regular diet with honey thick liquids   Meds as tolerated   Aspiration precautions  HOB >45 degrees during all intake and for at least 30 min after po   Small bites/sips, Slow rate of intake   Double swallow per bite/sip   Oral care three times daily     Patient continues to benefit from skilled intervention to address the above impairments. Continue treatment per established plan of care. Discharge Recommendations:  Rehab     SUBJECTIVE:   Patient stated Climmie Henrry is this? .    OBJECTIVE:   Cognitive and Communication Status:  Neurologic State: Alert, Appropriate for age, Eyes open spontaneously  Orientation Level: Oriented to person, Oriented to place, Oriented to situation  Cognition: Follows commands  Perception: Appears intact  Perseveration: No perseveration noted  Safety/Judgement: Fall prevention  Dysphagia Treatment:  Oral Assessment:  Oral Assessment  Labial: No impairment  Dentition: Natural  Oral Hygiene: Good  Lingual: No impairment  Velum: No impairment  Mandible: No impairment  P.O. Trials:   Patient Position: 46 at [facility]   Vocal quality prior to P.O.: Low volume   Consistency Presented: Honey thick liquid, Solid   How Presented: Self-fed/presented, Cup/sip, Successive swallows       Bolus Acceptance: No impairment   Bolus Formation/Control: No impairment       Propulsion: No impairment   Oral Residue: None   Initiation of Swallow: No impairment   Laryngeal Elevation: Decreased   Aspiration Signs/Symptoms: None   Pharyngeal Phase Characteristics: Poor endurance   Effective Modifications: Small sips and bites   Cues for Modifications: Moderate         Oral Phase Severity: No impairment   Pharyngeal Phase Severity : Mild   Oral Motor Exercises:               Exercises:  Laryngeal Exercises:         PAIN:  Pain level pre-treatment: 0/10   Pain level post-treatment: 0/10   Pain Intervention(s): Medication (see MAR);  Rest, Ice, Repositioning  Response to intervention: Nurse notified, See doc flow    After treatment:   []              Patient left in no apparent distress sitting up in chair  [x]              Patient left in no apparent distress in bed  [x] Call bell left within reach  []              Nursing notified  []              Family present  []              Caregiver present  []              Bed alarm activated      COMMUNICATION/EDUCATION:   [x] Aspiration precautions; swallow safety; compensatory techniques  []        Patient unable to participate in education; education ongoing with staff  [x]  Posted safety precautions in patient's room.   [x] Oral-motor/laryngeal strengthening exercises      SERA Tripathi MA, CCC-SLP  Speech-Language Pathologist    Time Calculation: 10 mins

## 2021-03-27 LAB
ANION GAP SERPL CALC-SCNC: 9 MMOL/L (ref 3–18)
BUN SERPL-MCNC: 26 MG/DL (ref 7–18)
BUN/CREAT SERPL: 30 (ref 12–20)
CALCIUM SERPL-MCNC: 8.2 MG/DL (ref 8.5–10.1)
CHLORIDE SERPL-SCNC: 103 MMOL/L (ref 100–111)
CO2 SERPL-SCNC: 21 MMOL/L (ref 21–32)
CORTIS AM PEAK SERPL-MCNC: 14.8 UG/DL (ref 4.3–22.45)
CREAT SERPL-MCNC: 0.87 MG/DL (ref 0.6–1.3)
ERYTHROCYTE [DISTWIDTH] IN BLOOD BY AUTOMATED COUNT: 13.3 % (ref 11.6–14.5)
FERRITIN SERPL-MCNC: 761 NG/ML (ref 8–388)
FOLATE SERPL-MCNC: 10.4 NG/ML (ref 3.1–17.5)
GLUCOSE SERPL-MCNC: 86 MG/DL (ref 74–99)
HCT VFR BLD AUTO: 26.3 % (ref 36–48)
HGB BLD-MCNC: 9 G/DL (ref 13–16)
IRON SATN MFR SERPL: 14 % (ref 20–50)
IRON SERPL-MCNC: 28 UG/DL (ref 50–175)
MCH RBC QN AUTO: 30.8 PG (ref 24–34)
MCHC RBC AUTO-ENTMCNC: 34.2 G/DL (ref 31–37)
MCV RBC AUTO: 90.1 FL (ref 74–97)
PLATELET # BLD AUTO: 164 K/UL (ref 135–420)
PMV BLD AUTO: 10.4 FL (ref 9.2–11.8)
POTASSIUM SERPL-SCNC: 3.6 MMOL/L (ref 3.5–5.5)
RBC # BLD AUTO: 2.92 M/UL (ref 4.7–5.5)
SODIUM SERPL-SCNC: 133 MMOL/L (ref 136–145)
T4 FREE SERPL-MCNC: 1.1 NG/DL (ref 0.7–1.5)
TIBC SERPL-MCNC: 204 UG/DL (ref 250–450)
TSH SERPL DL<=0.05 MIU/L-ACNC: 1.51 UIU/ML (ref 0.36–3.74)
VIT B12 SERPL-MCNC: 1048 PG/ML (ref 211–911)
WBC # BLD AUTO: 7 K/UL (ref 4.6–13.2)

## 2021-03-27 PROCEDURE — 77030019905 HC CATH URETH INTMIT MDII -A

## 2021-03-27 PROCEDURE — 74011250636 HC RX REV CODE- 250/636: Performed by: INTERNAL MEDICINE

## 2021-03-27 PROCEDURE — 84439 ASSAY OF FREE THYROXINE: CPT

## 2021-03-27 PROCEDURE — 65660000000 HC RM CCU STEPDOWN

## 2021-03-27 PROCEDURE — 82728 ASSAY OF FERRITIN: CPT

## 2021-03-27 PROCEDURE — 74011250637 HC RX REV CODE- 250/637: Performed by: NURSE PRACTITIONER

## 2021-03-27 PROCEDURE — 36415 COLL VENOUS BLD VENIPUNCTURE: CPT

## 2021-03-27 PROCEDURE — 83540 ASSAY OF IRON: CPT

## 2021-03-27 PROCEDURE — 80048 BASIC METABOLIC PNL TOTAL CA: CPT

## 2021-03-27 PROCEDURE — 74011000250 HC RX REV CODE- 250: Performed by: INTERNAL MEDICINE

## 2021-03-27 PROCEDURE — 84443 ASSAY THYROID STIM HORMONE: CPT

## 2021-03-27 PROCEDURE — 85027 COMPLETE CBC AUTOMATED: CPT

## 2021-03-27 PROCEDURE — 82607 VITAMIN B-12: CPT

## 2021-03-27 PROCEDURE — 99232 SBSQ HOSP IP/OBS MODERATE 35: CPT | Performed by: INTERNAL MEDICINE

## 2021-03-27 PROCEDURE — 74011250637 HC RX REV CODE- 250/637: Performed by: HOSPITALIST

## 2021-03-27 PROCEDURE — 82533 TOTAL CORTISOL: CPT

## 2021-03-27 PROCEDURE — 74011250637 HC RX REV CODE- 250/637: Performed by: INTERNAL MEDICINE

## 2021-03-27 RX ORDER — CALCIUM CARB/MAGNESIUM CARB 311-232MG
5 TABLET ORAL
Status: DISCONTINUED | OUTPATIENT
Start: 2021-03-27 | End: 2021-03-31 | Stop reason: HOSPADM

## 2021-03-27 RX ORDER — MIRTAZAPINE 15 MG/1
15 TABLET, FILM COATED ORAL
Status: DISCONTINUED | OUTPATIENT
Start: 2021-03-27 | End: 2021-03-30

## 2021-03-27 RX ORDER — FAMOTIDINE 20 MG/1
20 TABLET, FILM COATED ORAL 2 TIMES DAILY
Status: DISCONTINUED | OUTPATIENT
Start: 2021-03-27 | End: 2021-03-31 | Stop reason: HOSPADM

## 2021-03-27 RX ADMIN — ACETAMINOPHEN 650 MG: 325 TABLET ORAL at 10:56

## 2021-03-27 RX ADMIN — LATANOPROST 1 DROP: 50 SOLUTION OPHTHALMIC at 22:40

## 2021-03-27 RX ADMIN — PROPAFENONE HYDROCHLORIDE 150 MG: 150 TABLET, FILM COATED ORAL at 08:56

## 2021-03-27 RX ADMIN — Medication 5 MG: at 22:35

## 2021-03-27 RX ADMIN — MIDODRINE HYDROCHLORIDE 10 MG: 5 TABLET ORAL at 11:02

## 2021-03-27 RX ADMIN — APIXABAN 5 MG: 5 TABLET, FILM COATED ORAL at 22:35

## 2021-03-27 RX ADMIN — MIDODRINE HYDROCHLORIDE 10 MG: 5 TABLET ORAL at 16:19

## 2021-03-27 RX ADMIN — MIRTAZAPINE 15 MG: 15 TABLET, FILM COATED ORAL at 22:35

## 2021-03-27 RX ADMIN — Medication 10 ML: at 06:32

## 2021-03-27 RX ADMIN — Medication 1000 UNITS: at 08:57

## 2021-03-27 RX ADMIN — TIMOLOL MALEATE 1 DROP: 2.5 SOLUTION/ DROPS OPHTHALMIC at 11:04

## 2021-03-27 RX ADMIN — TIMOLOL MALEATE 1 DROP: 2.5 SOLUTION/ DROPS OPHTHALMIC at 22:39

## 2021-03-27 RX ADMIN — FAMOTIDINE 20 MG: 20 TABLET ORAL at 18:00

## 2021-03-27 RX ADMIN — PROPAFENONE HYDROCHLORIDE 150 MG: 150 TABLET, FILM COATED ORAL at 22:35

## 2021-03-27 RX ADMIN — WATER 1 G: 1 INJECTION INTRAMUSCULAR; INTRAVENOUS; SUBCUTANEOUS at 10:52

## 2021-03-27 RX ADMIN — Medication 10 ML: at 15:13

## 2021-03-27 RX ADMIN — LEVOTHYROXINE SODIUM 25 MCG: 25 TABLET ORAL at 08:57

## 2021-03-27 RX ADMIN — APIXABAN 5 MG: 5 TABLET, FILM COATED ORAL at 10:57

## 2021-03-27 RX ADMIN — MIDODRINE HYDROCHLORIDE 10 MG: 5 TABLET ORAL at 08:57

## 2021-03-27 RX ADMIN — PROPAFENONE HYDROCHLORIDE 150 MG: 150 TABLET, FILM COATED ORAL at 16:18

## 2021-03-27 RX ADMIN — Medication 10 ML: at 22:35

## 2021-03-27 NOTE — PROGRESS NOTES
Bedside shift change report given to Juan Tom (oncoming nurse) by Mell Holland (offgoing nurse). Report included the following information SBAR, Kardex, ED Summary, OR Summary, Procedure Summary, Intake/Output, MAR and Accordion.

## 2021-03-27 NOTE — PROGRESS NOTES
At 1500 Monitor tech levar called and stated patient had a 3.2 pause at 1336. Assesed patient and no signs of distress noted and patient has not voiced any compliants, MD East on floor and was made aware.  Will monitor

## 2021-03-27 NOTE — PROGRESS NOTES
Hospitalist Progress Note    Patient: Sheng Bernabe Age: 81 y.o. : 1935 MR#: [id number] SSN: [id number]  Date/Time: 3/27/2021 2:53 PM    DOA: 3/23/2021  PCP: Guicho Gordillo MD    Subjective:     His daughter in-law asked if he had a stroke since he is slow in his speech and she has swallowing problem. Nursing has report flat affect in the last few days. Patient stated that he does not have a stroke,   He feels depressed being in the hospital and \"they don't let me drink water. \"  He requests if he can drink water  Noted that he has been on ceftriaxone, urine culture is negative   CXR with negative. Note he failed his MBS with thin liquid   Hgb/hct is lowered   Stable renal function     His SBP is better today, metoprolol has been held by previous team.   Midodrine has been increased yesterday   He remains on Eliquis         Interval Hospital Course:        ROS:  No current fever/chills, no headache, no dizziness, no facial pain, no sinus congestion,   No swallowing pain, No chest pain, no palpitation, no shortness of breath, no abd pain,  No diarrhea, no urinary complaint, no leg pain or swelling      Assessment/Plan:   1. Paroxysmal Atrial flutter s/p cardioversion per Dr. Destiny Saavedra   2. Mild decompensated diastolic CHF,         _Echo with preserved EF, mild concentric hypertrophy, mild TR   3. Orthostatic hypotension, despite fluid resuscitation   4. Hypothyroidism   5. Hyponatremia   6. Fall at home   7. SIRS on presentation, no clear infectious process         FEVER resolved   8. Normocytic anemia   9. Former smoker  8. Dysphagia of thin liquid, failed MBS with thin liquid   11. Acute urinary retention   12. Physical deconditioning   13. Depression    Will continue with his current Propafenone and Eliquis per cardiology. Lopressor on hold due to low BP. Cont Midodrine  Check TSH, FT4, cortisol level. Add pepcid. Melatonin.  Add mirtazapine QHS   Cont synthroid   Stop Ceftriaxone as there is no clear source of infection   ICS   pepcid  OOB  PT/OT. Will scan his brain with CT again if change in mentation     Full code  Spoke with family with clinical updates     Additional Notes:    Time spent >35 minutes    Case discussed with:  [x]Patient  [x]Family  [x]Nursing  [x]Case Management  DVT Prophylaxis:  []Lovenox  [x]Eliquis   []SCDs  []Coumadin   []On Heparin gtt    Signed By: Analy Delgado MD     2021 2:53 PM              Objective:   VS:   Visit Vitals  BP (!) 153/83 (BP 1 Location: Left upper arm, BP Patient Position: At rest) Comment: Reported to YOSVANY Prado    Pulse 66   Temp 98.9 °F (37.2 °C)   Resp 18   Ht 6' (1.829 m)   Wt 98 kg (216 lb)   SpO2 95%   BMI 29.29 kg/m²      Tmax/24hrs: Temp (24hrs), Av.2 °F (37.3 °C), Min:97.2 °F (36.2 °C), Max:101 °F (38.3 °C)      Intake/Output Summary (Last 24 hours) at 3/27/2021 1453  Last data filed at 3/27/2021 0630  Gross per 24 hour   Intake    Output 700 ml   Net -700 ml       Tele: sinus  General:  Cooperative, Not in acute distress, speaks in full sentence while in bed  HEENT: PERRL, EOMI, supple neck, no JVD, dry oral mucosa  Cardiovascular: S1S2 regular, no rub/gallop   Pulmonary:  air entry bilaterally, no wheezing, no crackle  GI:  Soft, non tender, non distended, +bs, no guarding   Extremities:  No pedal edema, +distal pulses appreciated   Neuro: AOx3, moving all extremities, no gross deficit.      Additional:       Current Facility-Administered Medications   Medication Dose Route Frequency    melatonin (rapid dissolve) tablet 5 mg  5 mg Oral QHS    mirtazapine (REMERON) tablet 15 mg  15 mg Oral QHS    famotidine (PEPCID) tablet 20 mg  20 mg Oral BID    midodrine (PROAMATINE) tablet 10 mg  10 mg Oral TID WITH MEALS    [Held by provider] metoprolol tartrate (LOPRESSOR) tablet 25 mg  25 mg Oral BID    cefTRIAXone (ROCEPHIN) 1 g in sterile water (preservative free) 10 mL IV syringe  1 g IntraVENous Q24H    propafenone (RYTHMOL) tablet 150 mg  150 mg Oral TID    apixaban (ELIQUIS) tablet 5 mg  5 mg Oral BID    cholecalciferol (VITAMIN D3) (1000 Units /25 mcg) tablet 1,000 Units  1,000 Units Oral DAILY    levothyroxine (SYNTHROID) tablet 25 mcg  25 mcg Oral ACB    timolol (TIMOPTIC) 0.25% ophthalmic solution  1 Drop Both Eyes BID    latanoprost (XALATAN) 0.005 % ophthalmic solution 1 Drop  1 Drop Both Eyes QHS    sodium chloride (NS) flush 5-40 mL  5-40 mL IntraVENous Q8H    sodium chloride (NS) flush 5-40 mL  5-40 mL IntraVENous PRN    acetaminophen (TYLENOL) tablet 650 mg  650 mg Oral Q6H PRN    Or    acetaminophen (TYLENOL) suppository 650 mg  650 mg Rectal Q6H PRN    polyethylene glycol (MIRALAX) packet 17 g  17 g Oral DAILY PRN    ondansetron (ZOFRAN) injection 4 mg  4 mg IntraVENous Q8H PRN            Lab/Data Review:  Labs: Results:       Chemistry Recent Labs     03/27/21 0237 03/26/21 0034 03/25/21  0150   GLU 86 130* 118*   * 132* 133*   K 3.6 3.9 4.0    101 102   CO2 21 22 23   BUN 26* 28* 25*   CREA 0.87 1.03 0.94   BUCR 30* 27* 27*   AGAP 9 9 8   CA 8.2* 8.5 8.1*   PHOS  --   --  2.4*     No results for input(s): TBIL, ALT, ALKP, TP, ALB, GLOB, AGRAT in the last 72 hours. No lab exists for component: SGOT   CBC w/Diff Recent Labs     03/27/21 0237 03/26/21 0034 03/25/21  0150   WBC 7.0 7.0 7.8   RBC 2.92* 3.05* 3.11*   HGB 9.0* 9.4* 9.8*   HCT 26.3* 27.7* 28.2*   MCV 90.1 90.8 90.7   MCH 30.8 30.8 31.5   MCHC 34.2 33.9 34.8   RDW 13.3 13.3 13.3    180 142   GRANS  --   --  77*   LYMPH  --   --  10*   EOS  --   --  4      Coagulation No results for input(s): PTP, INR, APTT, INREXT in the last 72 hours.     Iron/Ferritin No results found for: IRON, FE, TIBC, IBCT, PSAT, FERR    BNP    Cardiac Enzymes Lab Results   Component Value Date/Time     03/23/2021 03:20 PM    CK - MB 1.6 03/23/2021 03:20 PM    CK-MB Index 1.0 03/23/2021 03:20 PM    Troponin-I, QT 0.03 03/23/2021 03:20 PM Lactic Acid    Thyroid Studies          All Micro Results     Procedure Component Value Units Date/Time    CULTURE, BLOOD [[id number]] Collected: 03/23/21 1956    Order Status: Completed Specimen: Blood Updated: 03/27/21 0846     Special Requests: NO SPECIAL REQUESTS        Culture result: NO GROWTH 4 DAYS       CULTURE, BLOOD [[id number]] Collected: 03/23/21 1510    Order Status: Completed Specimen: Blood Updated: 03/27/21 0846     Special Requests: NO SPECIAL REQUESTS        Culture result: NO GROWTH 4 DAYS       C. DIFFICILE AG & TOXIN A/B [[id number]]     Order Status: Canceled Specimen: Stool     CULTURE, URINE [[id number]] Collected: 03/25/21 0730    Order Status: Completed Specimen: Urine from Clean catch Updated: 03/26/21 1042     Special Requests: NO SPECIAL REQUESTS        Culture result: No growth (<1,000 CFU/ML)       CULTURE, URINE [[id number]] Collected: 03/24/21 1100    Order Status: Canceled Specimen: Urine from Clean catch     COVID-19 RAPID TEST [[id number]] Collected: 03/24/21 0126    Order Status: Completed Specimen: Nasopharyngeal Updated: 03/24/21 0306     Specimen source Nasopharyngeal        COVID-19 rapid test Not detected        Comment: Rapid Abbott ID Now       Rapid NAAT:  The specimen is NEGATIVE for SARS-CoV-2, the novel coronavirus associated with COVID-19. Negative results should be treated as presumptive and, if inconsistent with clinical signs and symptoms or necessary for patient management, should be tested with an alternative molecular assay. Negative results do not preclude SARS-CoV-2 infection and should not be used as the sole basis for patient management decisions. This test has been authorized by the FDA under an Emergency Use Authorization (EUA) for use by authorized laboratories.    Fact sheet for Healthcare Providers: ConventionUpdate.co.nz  Fact sheet for Patients: ConventionUpdate.co.nz       Methodology: Isothermal Nucleic Acid Amplification                 Images:    CT (Most Recent). CT Results (most recent):  Results from Hospital Encounter encounter on 03/23/21   CT HEAD WO CONT    Narrative CT head without IV contrast    HISTORY: Patient fell. On blood thinner. No prior studies. All CT scans at this facility are performed using dose optimization technique as  appropriate to a performed exam, to include automated exposure control,  adjustment of the mA and/or kV according to patient size (including appropriate  matching for site specific examination) or use of iterative reconstruction  technique. No midline shift or extra-axial collection. No intracranial hemorrhage, mass  lesions or cortical infarct involving a major vessel. Presumed small vessel  ischemic disease deep white matter, mild. No skull fracture. Mild mucosal  disease in ethmoid air cells. Mastoid air cells are aerated. Impression No acute intracranial abnormalities. No hemorrhage. XRAY (Most Recent)      EKG Results for orders placed or performed in visit on 09/13/19   AMB POC EKG ROUTINE W/ 12 LEADS, INTER & REP     Status: None    Impression    See progress note. 2D ECHO 02/08/21   ECHO ADULT COMPLETE 02/09/2021 2/9/2021    Narrative · LV: Estimated LVEF is 55 - 60%. Normal cavity size and systolic function   (ejection fraction normal). Mild concentric hypertrophy. Wall motion:   normal. Age-appropriate left ventricular diastolic function. · LA: Left Atrium volume index is 30.57 mL/m2. · RV: Normal right ventricular size and function. · TV: Mild tricuspid valve regurgitation is present. · PA: Pulmonary arterial systolic pressure is 34 mmHg. · MV: Mild mitral annular calcification.         Signed by: Trino Alexander MD

## 2021-03-27 NOTE — PROGRESS NOTES
[facility], [address] [place] [address] [facility]  Fax: [phone]      CARDIOLOGY PROGRESS NOTE  RECS:  1. Atrial flutter, paroxysmal: Status post cardioversion 3/23/2021. Telemetry shows Atrial Flutter. HR. 60's. 3.2 second pause 3/27/21 @13:36. Off BB. Monitor. Consider discontinuing Propafenone. 2. 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 Watch on telemetry for any advanced AV blocks-stable   3. Congestive heart failure, chronic diastolic-mildly  Neg 476 cc. 4. Fever: Possible sepsis. Work-up and treatment in progress per medicine. 5. Dysphagia- being follow by ST on honey thick liquids and aspiration precautions   6.  UTI/urinary retention- requiring indwelling catheter- tx per medical team        ASSESSMENT:  Hospital Problems  Date Reviewed: 3/23/2021          Codes Class Noted POA    Atrial flutter ([facility] ICD-10-CM: I56.66  ICD-9-CM: 427.32  3/23/2021 Yes        * (Principal) Orthostatic hypotension ICD-10-CM: I95.1  ICD-9-CM: 458.0  3/23/2021 Unknown        Fever ICD-10-CM: R50.9  ICD-9-CM: 780.60  3/23/2021 Unknown        RBBB (right bundle branch block with left anterior fascicular block) ICD-10-CM: I45.2  ICD-9-CM: 426.52  9/13/2019 Yes        Acute on chronic diastolic congestive heart failure ([facility] ICD-10-CM: I50.33  ICD-9-CM: 428.33, 428.0  7/19/2017 No    Overview Addendum 8/31/2017 12:09 PM by Macario Ayala MD     8/17 no edema today; 7/17 adv to stop all herbal OTC products for now                   OBJECTIVE:    VS:   Visit Vitals  BP (!) 153/83 (BP 1 Location: Left upper arm, BP Patient Position: At rest)   Pulse 66   Temp 98.9 °F (37.2 °C)   Resp 18   Ht 6' (1.829 m)   Wt 98 kg (216 lb)   SpO2 95%   BMI 29.29 kg/m²         Intake/Output Summary (Last 24 hours) at 3/27/2021 1510  Last data filed at 3/27/2021 0630  Gross per 24 hour   Intake    Output 700 ml   Net -700 ml TELE:  Atrial Flutter HR 67.       Labs: Results:       Chemistry Recent Labs     03/27/21 0237 03/26/21  0034 03/25/21  0150   GLU 86 130* 118*   * 132* 133*   K 3.6 3.9 4.0    101 102   CO2 21 22 23   BUN 26* 28* 25*   CREA 0.87 1.03 0.94   CA 8.2* 8.5 8.1*   MG  --   --  2.0   PHOS  --   --  2.4*   AGAP 9 9 8   BUCR 30* 27* 27*      CBC w/Diff Recent Labs     03/27/21 0237 03/26/21  0034 03/25/21  0150   WBC 7.0 7.0 7.8   RBC 2.92* 3.05* 3.11*   HGB 9.0* 9.4* 9.8*   HCT 26.3* 27.7* 28.2*    180 142   GRANS  --   --  77*   LYMPH  --   --  10*   EOS  --   --  4      Cardiac Enzymes No results for input(s): CPK, CKND1, ARMEN in the last 72 hours. No lab exists for component: CKRMB, TROIP   Coagulation No results for input(s): PTP, INR, APTT, INREXT in the last 72 hours. Lipid Panel No results found for: CHOL, CHOLPOCT, CHOLX, CHLST, CHOLV, 461320, HDL, HDLP, LDL, LDLC, DLDLP, 277573, VLDLC, VLDL, TGLX, TRIGL, TRIGP, TGLPOCT, CHHD, CHHDX   BNP No results for input(s): BNPP in the last 72 hours. Liver Enzymes No results for input(s): TP, ALB, TBIL, AP in the last 72 hours.     No lab exists for component: SGOT, GPT, DBIL   Digoxin    Thyroid Studies No results found for: T4, T3U, TSH, TSHEXT           Rosa Johnson MD   Pager # [phone]

## 2021-03-27 NOTE — PROGRESS NOTES
Not voided, bladder scan done, >664 mls. Order was placed last PM for cdiff sample to be collected and sent to lab. Patient was medicated with Miralax on 03/24/21 because patient had stated that he did not have a bowel movement for a few days prior to admission; does not meet criteria for CDiff testing. Hospitalist informed who ordered staight catheterization as well as to discontinue CDiff order.

## 2021-03-28 ENCOUNTER — APPOINTMENT (OUTPATIENT)
Dept: CT IMAGING | Age: 86
DRG: 292 | End: 2021-03-28
Attending: INTERNAL MEDICINE
Payer: MEDICARE

## 2021-03-28 ENCOUNTER — APPOINTMENT (OUTPATIENT)
Dept: GENERAL RADIOLOGY | Age: 86
DRG: 292 | End: 2021-03-28
Attending: INTERNAL MEDICINE
Payer: MEDICARE

## 2021-03-28 LAB
ANION GAP SERPL CALC-SCNC: 12 MMOL/L (ref 3–18)
APPEARANCE UR: CLEAR
BACTERIA URNS QL MICRO: ABNORMAL /HPF
BILIRUB UR QL: NEGATIVE
BNP SERPL-MCNC: ABNORMAL PG/ML (ref 0–1800)
BUN SERPL-MCNC: 22 MG/DL (ref 7–18)
BUN/CREAT SERPL: 28 (ref 12–20)
CALCIUM SERPL-MCNC: 8.3 MG/DL (ref 8.5–10.1)
CHLORIDE SERPL-SCNC: 101 MMOL/L (ref 100–111)
CO2 SERPL-SCNC: 21 MMOL/L (ref 21–32)
COLOR UR: YELLOW
CREAT SERPL-MCNC: 0.78 MG/DL (ref 0.6–1.3)
EPITH CASTS URNS QL MICRO: ABNORMAL /LPF (ref 0–5)
ERYTHROCYTE [DISTWIDTH] IN BLOOD BY AUTOMATED COUNT: 13.2 % (ref 11.6–14.5)
GLUCOSE BLD STRIP.AUTO-MCNC: 125 MG/DL (ref 70–110)
GLUCOSE SERPL-MCNC: 48 MG/DL (ref 74–99)
GLUCOSE UR STRIP.AUTO-MCNC: NEGATIVE MG/DL
HCT VFR BLD AUTO: 28.7 % (ref 36–48)
HGB BLD-MCNC: 9.8 G/DL (ref 13–16)
HGB UR QL STRIP: ABNORMAL
KETONES UR QL STRIP.AUTO: ABNORMAL MG/DL
LEUKOCYTE ESTERASE UR QL STRIP.AUTO: ABNORMAL
MAGNESIUM SERPL-MCNC: 2 MG/DL (ref 1.6–2.6)
MCH RBC QN AUTO: 30.8 PG (ref 24–34)
MCHC RBC AUTO-ENTMCNC: 34.1 G/DL (ref 31–37)
MCV RBC AUTO: 90.3 FL (ref 74–97)
MUCOUS THREADS URNS QL MICRO: ABNORMAL /LPF
NITRITE UR QL STRIP.AUTO: NEGATIVE
PH UR STRIP: 5.5 [PH] (ref 5–8)
PHOSPHATE SERPL-MCNC: 2.6 MG/DL (ref 2.5–4.9)
PLATELET # BLD AUTO: 222 K/UL (ref 135–420)
PMV BLD AUTO: 10.3 FL (ref 9.2–11.8)
POTASSIUM SERPL-SCNC: 3.4 MMOL/L (ref 3.5–5.5)
PROT UR STRIP-MCNC: 30 MG/DL
RBC # BLD AUTO: 3.18 M/UL (ref 4.7–5.5)
RBC #/AREA URNS HPF: ABNORMAL /HPF (ref 0–5)
SODIUM SERPL-SCNC: 134 MMOL/L (ref 136–145)
SP GR UR REFRACTOMETRY: 1.02 (ref 1–1.03)
UROBILINOGEN UR QL STRIP.AUTO: 0.2 EU/DL (ref 0.2–1)
WBC # BLD AUTO: 8 K/UL (ref 4.6–13.2)
WBC URNS QL MICRO: ABNORMAL /HPF (ref 0–4)

## 2021-03-28 PROCEDURE — 65660000000 HC RM CCU STEPDOWN

## 2021-03-28 PROCEDURE — 81001 URINALYSIS AUTO W/SCOPE: CPT

## 2021-03-28 PROCEDURE — 71046 X-RAY EXAM CHEST 2 VIEWS: CPT

## 2021-03-28 PROCEDURE — 83880 ASSAY OF NATRIURETIC PEPTIDE: CPT

## 2021-03-28 PROCEDURE — 85027 COMPLETE CBC AUTOMATED: CPT

## 2021-03-28 PROCEDURE — 93005 ELECTROCARDIOGRAM TRACING: CPT

## 2021-03-28 PROCEDURE — 74011250637 HC RX REV CODE- 250/637: Performed by: HOSPITALIST

## 2021-03-28 PROCEDURE — P9047 ALBUMIN (HUMAN), 25%, 50ML: HCPCS | Performed by: INTERNAL MEDICINE

## 2021-03-28 PROCEDURE — 74011000250 HC RX REV CODE- 250: Performed by: INTERNAL MEDICINE

## 2021-03-28 PROCEDURE — 83735 ASSAY OF MAGNESIUM: CPT

## 2021-03-28 PROCEDURE — 74011250636 HC RX REV CODE- 250/636: Performed by: INTERNAL MEDICINE

## 2021-03-28 PROCEDURE — 99232 SBSQ HOSP IP/OBS MODERATE 35: CPT | Performed by: INTERNAL MEDICINE

## 2021-03-28 PROCEDURE — 74011250637 HC RX REV CODE- 250/637: Performed by: INTERNAL MEDICINE

## 2021-03-28 PROCEDURE — 2709999900 HC NON-CHARGEABLE SUPPLY

## 2021-03-28 PROCEDURE — 77030040830 HC CATH URETH FOL MDII -A

## 2021-03-28 PROCEDURE — 82962 GLUCOSE BLOOD TEST: CPT

## 2021-03-28 PROCEDURE — 74011250637 HC RX REV CODE- 250/637: Performed by: NURSE PRACTITIONER

## 2021-03-28 PROCEDURE — 36415 COLL VENOUS BLD VENIPUNCTURE: CPT

## 2021-03-28 PROCEDURE — 80048 BASIC METABOLIC PNL TOTAL CA: CPT

## 2021-03-28 PROCEDURE — 84100 ASSAY OF PHOSPHORUS: CPT

## 2021-03-28 PROCEDURE — 70450 CT HEAD/BRAIN W/O DYE: CPT

## 2021-03-28 PROCEDURE — 87086 URINE CULTURE/COLONY COUNT: CPT

## 2021-03-28 RX ORDER — LANOLIN ALCOHOL/MO/W.PET/CERES
1 CREAM (GRAM) TOPICAL
Status: DISCONTINUED | OUTPATIENT
Start: 2021-03-28 | End: 2021-03-28

## 2021-03-28 RX ORDER — LANOLIN ALCOHOL/MO/W.PET/CERES
1 CREAM (GRAM) TOPICAL 2 TIMES DAILY WITH MEALS
Status: DISCONTINUED | OUTPATIENT
Start: 2021-03-28 | End: 2021-03-31 | Stop reason: HOSPADM

## 2021-03-28 RX ORDER — ALBUMIN HUMAN 250 G/1000ML
25 SOLUTION INTRAVENOUS ONCE
Status: COMPLETED | OUTPATIENT
Start: 2021-03-28 | End: 2021-03-28

## 2021-03-28 RX ORDER — CHOLECALCIFEROL (VITAMIN D3) 125 MCG
5000 CAPSULE ORAL DAILY
Status: DISCONTINUED | OUTPATIENT
Start: 2021-03-29 | End: 2021-03-31 | Stop reason: HOSPADM

## 2021-03-28 RX ORDER — ALBUMIN HUMAN 250 G/1000ML
25 SOLUTION INTRAVENOUS ONCE
Status: COMPLETED | OUTPATIENT
Start: 2021-03-29 | End: 2021-03-29

## 2021-03-28 RX ORDER — TAMSULOSIN HYDROCHLORIDE 0.4 MG/1
0.4 CAPSULE ORAL
Status: DISCONTINUED | OUTPATIENT
Start: 2021-03-28 | End: 2021-03-31 | Stop reason: HOSPADM

## 2021-03-28 RX ORDER — UREA 10 %
2 LOTION (ML) TOPICAL 2 TIMES DAILY
Status: DISCONTINUED | OUTPATIENT
Start: 2021-03-28 | End: 2021-03-31 | Stop reason: HOSPADM

## 2021-03-28 RX ORDER — FUROSEMIDE 10 MG/ML
40 INJECTION INTRAMUSCULAR; INTRAVENOUS ONCE
Status: COMPLETED | OUTPATIENT
Start: 2021-03-28 | End: 2021-03-28

## 2021-03-28 RX ORDER — FUROSEMIDE 10 MG/ML
20 INJECTION INTRAMUSCULAR; INTRAVENOUS 2 TIMES DAILY
Status: DISCONTINUED | OUTPATIENT
Start: 2021-03-28 | End: 2021-03-30

## 2021-03-28 RX ADMIN — Medication 10 ML: at 22:00

## 2021-03-28 RX ADMIN — PROPAFENONE HYDROCHLORIDE 150 MG: 150 TABLET, FILM COATED ORAL at 08:15

## 2021-03-28 RX ADMIN — WATER 1 G: 1 INJECTION INTRAMUSCULAR; INTRAVENOUS; SUBCUTANEOUS at 13:11

## 2021-03-28 RX ADMIN — LEVOTHYROXINE SODIUM 25 MCG: 25 TABLET ORAL at 08:15

## 2021-03-28 RX ADMIN — Medication 5 MG: at 21:38

## 2021-03-28 RX ADMIN — MIDODRINE HYDROCHLORIDE 10 MG: 5 TABLET ORAL at 11:21

## 2021-03-28 RX ADMIN — LATANOPROST 1 DROP: 50 SOLUTION OPHTHALMIC at 21:40

## 2021-03-28 RX ADMIN — APIXABAN 5 MG: 5 TABLET, FILM COATED ORAL at 21:38

## 2021-03-28 RX ADMIN — ALBUMIN (HUMAN) 25 G: 0.25 INJECTION, SOLUTION INTRAVENOUS at 16:11

## 2021-03-28 RX ADMIN — ALBUMIN (HUMAN) 25 G: 0.25 INJECTION, SOLUTION INTRAVENOUS at 09:50

## 2021-03-28 RX ADMIN — TAMSULOSIN HYDROCHLORIDE 0.4 MG: 0.4 CAPSULE ORAL at 21:37

## 2021-03-28 RX ADMIN — FUROSEMIDE 40 MG: 10 INJECTION, SOLUTION INTRAMUSCULAR; INTRAVENOUS at 13:11

## 2021-03-28 RX ADMIN — PROPAFENONE HYDROCHLORIDE 150 MG: 150 TABLET, FILM COATED ORAL at 21:40

## 2021-03-28 RX ADMIN — Medication 10 ML: at 05:30

## 2021-03-28 RX ADMIN — Medication 2 TABLET: at 21:37

## 2021-03-28 RX ADMIN — PROPAFENONE HYDROCHLORIDE 150 MG: 150 TABLET, FILM COATED ORAL at 17:18

## 2021-03-28 RX ADMIN — Medication 10 ML: at 13:12

## 2021-03-28 RX ADMIN — APIXABAN 5 MG: 5 TABLET, FILM COATED ORAL at 09:51

## 2021-03-28 RX ADMIN — MIDODRINE HYDROCHLORIDE 10 MG: 5 TABLET ORAL at 16:11

## 2021-03-28 RX ADMIN — FAMOTIDINE 20 MG: 20 TABLET ORAL at 08:15

## 2021-03-28 RX ADMIN — FERROUS SULFATE TAB 325 MG (65 MG ELEMENTAL FE) 325 MG: 325 (65 FE) TAB at 21:38

## 2021-03-28 RX ADMIN — MIDODRINE HYDROCHLORIDE 10 MG: 5 TABLET ORAL at 08:15

## 2021-03-28 RX ADMIN — MIRTAZAPINE 15 MG: 15 TABLET, FILM COATED ORAL at 21:38

## 2021-03-28 RX ADMIN — Medication 1000 UNITS: at 08:15

## 2021-03-28 RX ADMIN — FUROSEMIDE 20 MG: 10 INJECTION, SOLUTION INTRAMUSCULAR; INTRAVENOUS at 21:38

## 2021-03-28 RX ADMIN — FAMOTIDINE 20 MG: 20 TABLET ORAL at 18:00

## 2021-03-28 RX ADMIN — TIMOLOL MALEATE 1 DROP: 2.5 SOLUTION/ DROPS OPHTHALMIC at 21:41

## 2021-03-28 RX ADMIN — TIMOLOL MALEATE 1 DROP: 2.5 SOLUTION/ DROPS OPHTHALMIC at 11:22

## 2021-03-28 RX ADMIN — FERROUS SULFATE TAB 325 MG (65 MG ELEMENTAL FE) 325 MG: 325 (65 FE) TAB at 09:50

## 2021-03-28 NOTE — PROGRESS NOTES
Lab called with a critical Glucose of 48. MD made aware, Patient off floor and instructed to get fs when patient returns to floor.  And let MD know

## 2021-03-28 NOTE — PROGRESS NOTES
Bedside shift change report given to [facility] [place] [facility] [place] RN (oncoming nurse) by [facility] [place] LPN (offgoing nurse). Report included the following information SBAR, Kardex, ED Summary, Intake/Output, MAR and Recent Results.

## 2021-03-28 NOTE — PROGRESS NOTES
[facility], [address] [place] [address] [facility]  Fax: [phone]      CARDIOLOGY PROGRESS NOTE  RECS:  1. Atrial flutter, paroxysmal: Status post cardioversion 3/23/2021. Telemetry shows Atrial Flutter. HR. 60's. 3.2 second pause 3/27/21 @13:36. Off BB. Monitor. Consider discontinuing Propafenone. Continue Apixaban. Check EKG. 2. 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 Watch on telemetry for any advanced AV blocks-stable   3. Acute on chronic diastolic heart failure.  Neg 670 cc. Probably inaccurate. Furosemide 40  IV X 1. Furosemide 20 mg IV q12. BMP,Mg daily. 4. Fever: Possible sepsis. Work-up and treatment in progress per medicine. 5. Dysphagia- being follow by ST on honey thick liquids and aspiration precautions   6. UTI/urinary retention- requiring indwelling catheter- tx per medical team        ASSESSMENT:  Hospital Problems  Date Reviewed: 3/23/2021          Codes Class Noted POA    Atrial flutter ([facility] ICD-10-CM: I48.92  ICD-9-CM: 427.32  3/23/2021 Yes        * (Principal) Orthostatic hypotension ICD-10-CM: I95.1  ICD-9-CM: 458.0  3/23/2021 Unknown        Fever ICD-10-CM: R50.9  ICD-9-CM: 780.60  3/23/2021 Unknown        RBBB (right bundle branch block with left anterior fascicular block) ICD-10-CM: I45.2  ICD-9-CM: 426.52  9/13/2019 Yes        Acute on chronic diastolic congestive heart failure ([facility] ICD-10-CM: I50.33  ICD-9-CM: 428.33, 428.0  7/19/2017 No    Overview Addendum 8/31/2017 12:09 PM by Tremaine Alicia MD     8/17 no edema today; 7/17 adv to stop all herbal OTC products for now                     SUBJECTIVE:  C/o dyspnea sitting up in bed.      OBJECTIVE:    VS:   Visit Vitals  BP (!) 144/73 (BP 1 Location: Left upper arm, BP Patient Position: At rest)   Pulse 61   Temp 97.9 °F (36.6 °C)   Resp 18   Ht 6' (1.829 m)   Wt 98.3 kg (216 lb 11.4 oz)   SpO2 98%   BMI 29.39 kg/m² No intake or output data in the 24 hours ending 03/28/21 1354  TELE: Atrial flutter HR 60    General: No acute distress  HENT: Normocephalic, atraumatic. Neck :  Supple  Cardiac:  Normal S1/S2  Chest/Lungs:Course at bases  Abdomen: Soft  Extremities:  No edema      Labs: Results:       Chemistry Recent Labs     03/28/21  0148 03/27/21  0237 03/26/21  0034   GLU 48* 86 130*   * 133* 132*   K 3.4* 3.6 3.9    103 101   CO2 21 21 22   BUN 22* 26* 28*   CREA 0.78 0.87 1.03   CA 8.3* 8.2* 8.5   MG 2.0  --   --    PHOS 2.6  --   --    AGAP 12 9 9   BUCR 28* 30* 27*      CBC w/Diff Recent Labs     03/28/21  0148 03/27/21 0237 03/26/21  0034   WBC 8.0 7.0 7.0   RBC 3.18* 2.92* 3.05*   HGB 9.8* 9.0* 9.4*   HCT 28.7* 26.3* 27.7*    164 180      Cardiac Enzymes No results for input(s): CPK, CKND1, ARMEN in the last 72 hours. No lab exists for component: CKRMB, TROIP   Coagulation No results for input(s): PTP, INR, APTT, INREXT in the last 72 hours. Lipid Panel No results found for: CHOL, CHOLPOCT, CHOLX, CHLST, CHOLV, 454308, HDL, HDLP, LDL, LDLC, DLDLP, 584717, VLDLC, VLDL, TGLX, TRIGL, TRIGP, TGLPOCT, CHHD, CHHDX   BNP No results for input(s): BNPP in the last 72 hours. Liver Enzymes No results for input(s): TP, ALB, TBIL, AP in the last 72 hours.     No lab exists for component: SGOT, GPT, DBIL   Digoxin    Thyroid Studies Lab Results   Component Value Date/Time    TSH 1.51 03/27/2021 02:37 AM              Chan Cook MD   Pager # [phone]

## 2021-03-28 NOTE — PROGRESS NOTES
Hospitalist Progress Note    Patient: Nola Saavedra Age: 81 y.o. : 1935 MR#: [id number] SSN: [id number]  Date/Time: 3/28/2021 11:54 AM    DOA: 3/23/2021  PCP: Jackson Rodriguez MD    Subjective:     +shortness of breath,  Noted elevated proBNP. CXR with vascular congestion   He has no fever. Has been on ceftriaxone for UTI but urine culture is not impressive. Has urinary retention today, bladder scan 900s. Normal TSH, FT4, normal random cortisol level     Had tolerate Mirtazapine yesterday. Still feel depressed   He wants to drink thin liquid rather than thickening    Note he failed his MBS with thin liquid   Hgb/hct is stable. He has low iron noted   Stable renal function     His SBP is better, metoprolol has been held by previous team.   Midodrine tolerated at high dosing  He remains on Eliquis     Interval Hospital Course:      ROS:  No current fever/chills, no headache, no dizziness, no facial pain, no sinus congestion,   No swallowing pain, No chest pain, no palpitation, + shortness of breath, no abd pain,  No diarrhea, no urinary complaint, no leg pain or swelling    Assessment/Plan:     1. Paroxysmal Atrial flutter s/p cardioversion per Dr. Barbara Clemens, resolved   2. Acute on chronic diastolic CHF, elevated proBNP        _Echo with preserved EF, mild concentric hypertrophy, mild TR   3. Orthostatic hypotension, despite fluid resuscitation   4. Hypothyroidism, on replacement   5. Hyponatremia   6. Fall at home   7. SIRS on presentation, no clear infectious process         FEVER resolved   8. Normocytic anemia   9. Former smoker  8. Dysphagia of thin liquid, failed MBS with thin liquid   11. Acute urinary retention, now with recurrent retention, saba placed back today   12. Physical deconditioning   13. Depression    Spoke with patient and explained his plan of care today. He agreed to participate. He does not think he had a stroke. Ok to reorder CT head   CXR noted.  proBNP noted, will add lasix 40mg, thereafter lasix 20mg BID. +albumin  Continue with his current Propafenone and Eliquis per cardiology. Lopressor on hold due to low BP. Cont Midodrine  Cont pepcid. Melatonin. continue mirtazapine QHS   Cont synthroid   Stop Ceftriaxone as there is no clear source of infection (add probiotic)  ICS   OOB  PT/OT. Add iron supplement     Full code  Spoke with family [phone] with clinical updates and plane of care. Family is concern that he is given up on his care. Additional Notes:    Time spent >35 minutes    Case discussed with:  [x]Patient  [x]Family  [x]Nursing  [x]Case Management  DVT Prophylaxis:  []Lovenox  [x]Eliquis   []SCDs  []Coumadin   []On Heparin gtt    Signed By: Analy Delgado MD     2021 11:54 AM              Objective:   VS:   Visit Vitals  BP (!) 152/74 (BP 1 Location: Left upper arm, BP Patient Position: At rest) Comment: Reported to YOSVANY Prado   Pulse 66   Temp 99.2 °F (37.3 °C)   Resp 18   Ht 6' (1.829 m)   Wt 98.3 kg (216 lb 11.4 oz)   SpO2 98%   BMI 29.39 kg/m²      Tmax/24hrs: Temp (24hrs), Av.6 °F (37 °C), Min:98.1 °F (36.7 °C), Max:99.2 °F (37.3 °C)    No intake or output data in the 24 hours ending 21 1154    Tele: sinus  General:  Cooperative, Not in acute distress, speaks in full sentence while in bed  HEENT: PERRL, EOMI, supple neck, no JVD, dry oral mucosa  Cardiovascular: S1S2 regular, no rub/gallop   Pulmonary:  air entry bilaterally, no wheezing, no crackle  GI:  Soft, non tender, non distended, +bs, no guarding   Extremities:  No pedal edema, +distal pulses appreciated   Neuro: AOx3, moving all extremities, no gross deficit.      Additional:       Current Facility-Administered Medications   Medication Dose Route Frequency    ferrous sulfate tablet 325 mg  1 Tab Oral DAILY WITH BREAKFAST    melatonin (rapid dissolve) tablet 5 mg  5 mg Oral QHS    mirtazapine (REMERON) tablet 15 mg  15 mg Oral QHS    famotidine (PEPCID) tablet 20 mg 20 mg Oral BID    midodrine (PROAMATINE) tablet 10 mg  10 mg Oral TID WITH MEALS    [Held by provider] metoprolol tartrate (LOPRESSOR) tablet 25 mg  25 mg Oral BID    cefTRIAXone (ROCEPHIN) 1 g in sterile water (preservative free) 10 mL IV syringe  1 g IntraVENous Q24H    propafenone (RYTHMOL) tablet 150 mg  150 mg Oral TID    apixaban (ELIQUIS) tablet 5 mg  5 mg Oral BID    cholecalciferol (VITAMIN D3) (1000 Units /25 mcg) tablet 1,000 Units  1,000 Units Oral DAILY    levothyroxine (SYNTHROID) tablet 25 mcg  25 mcg Oral ACB    timolol (TIMOPTIC) 0.25% ophthalmic solution  1 Drop Both Eyes BID    latanoprost (XALATAN) 0.005 % ophthalmic solution 1 Drop  1 Drop Both Eyes QHS    sodium chloride (NS) flush 5-40 mL  5-40 mL IntraVENous Q8H    sodium chloride (NS) flush 5-40 mL  5-40 mL IntraVENous PRN    acetaminophen (TYLENOL) tablet 650 mg  650 mg Oral Q6H PRN    Or    acetaminophen (TYLENOL) suppository 650 mg  650 mg Rectal Q6H PRN    polyethylene glycol (MIRALAX) packet 17 g  17 g Oral DAILY PRN    ondansetron (ZOFRAN) injection 4 mg  4 mg IntraVENous Q8H PRN            Lab/Data Review:  Labs: Results:       Chemistry Recent Labs     03/28/21 0148 03/27/21 0237 03/26/21 0034   GLU  --  86 130*   NA  --  133* 132*   K  --  3.6 3.9   CL  --  103 101   CO2  --  21 22   BUN  --  26* 28*   CREA  --  0.87 1.03   BUCR  --  30* 27*   AGAP  --  9 9   CA  --  8.2* 8.5   PHOS 2.6  --   --      No results for input(s): TBIL, ALT, ALKP, TP, ALB, GLOB, AGRAT in the last 72 hours. No lab exists for component: SGOT   CBC w/Diff Recent Labs     03/28/21 0148 03/27/21 0237 03/26/21 0034   WBC 8.0 7.0 7.0   RBC 3.18* 2.92* 3.05*   HGB 9.8* 9.0* 9.4*   HCT 28.7* 26.3* 27.7*   MCV 90.3 90.1 90.8   MCH 30.8 30.8 30.8   MCHC 34.1 34.2 33.9   RDW 13.2 13.3 13.3    164 180      Coagulation No results for input(s): PTP, INR, APTT, INREXT, INREXT in the last 72 hours.     Iron/Ferritin Lab Results   Component Value Date/Time    Iron 28 (L) 03/27/2021 02:37 AM    TIBC 204 (L) 03/27/2021 02:37 AM    Iron % saturation 14 (L) 03/27/2021 02:37 AM    Ferritin 761 (H) 03/27/2021 02:37 AM       BNP    Cardiac Enzymes Lab Results   Component Value Date/Time     03/23/2021 03:20 PM    CK - MB 1.6 03/23/2021 03:20 PM    CK-MB Index 1.0 03/23/2021 03:20 PM    Troponin-I, QT 0.03 03/23/2021 03:20 PM        Lactic Acid    Thyroid Studies          All Micro Results     Procedure Component Value Units Date/Time    CULTURE, BLOOD [[id number]] Collected: 03/23/21 1956    Order Status: Completed Specimen: Blood Updated: 03/28/21 0700     Special Requests: NO SPECIAL REQUESTS        Culture result: NO GROWTH 5 DAYS       CULTURE, BLOOD [[id number]] Collected: 03/23/21 1510    Order Status: Completed Specimen: Blood Updated: 03/28/21 0700     Special Requests: NO SPECIAL REQUESTS        Culture result: NO GROWTH 5 DAYS       C. DIFFICILE AG & TOXIN A/B [[id number]]     Order Status: Canceled Specimen: Stool     CULTURE, URINE [[id number]] Collected: 03/25/21 0730    Order Status: Completed Specimen: Urine from Clean catch Updated: 03/26/21 1042     Special Requests: NO SPECIAL REQUESTS        Culture result: No growth (<1,000 CFU/ML)       CULTURE, URINE [[id number]] Collected: 03/24/21 1100    Order Status: Canceled Specimen: Urine from Clean catch     COVID-19 RAPID TEST [[id number]] Collected: 03/24/21 0126    Order Status: Completed Specimen: Nasopharyngeal Updated: 03/24/21 0306     Specimen source Nasopharyngeal        COVID-19 rapid test Not detected        Comment: Rapid Abbott ID Now       Rapid NAAT:  The specimen is NEGATIVE for SARS-CoV-2, the novel coronavirus associated with COVID-19. Negative results should be treated as presumptive and, if inconsistent with clinical signs and symptoms or necessary for patient management, should be tested with an alternative molecular assay.   Negative results do not preclude SARS-CoV-2 infection and should not be used as the sole basis for patient management decisions. This test has been authorized by the FDA under an Emergency Use Authorization (EUA) for use by authorized laboratories. Fact sheet for Healthcare Providers: ConventionUpdate.co.nz  Fact sheet for Patients: ConventionUpdate.co.nz       Methodology: Isothermal Nucleic Acid Amplification                 Images:    CT (Most Recent). CT Results (most recent):  Results from Hospital Encounter encounter on 03/23/21   CT HEAD WO CONT    Narrative CT head without IV contrast    HISTORY: Patient fell. On blood thinner. No prior studies. All CT scans at this facility are performed using dose optimization technique as  appropriate to a performed exam, to include automated exposure control,  adjustment of the mA and/or kV according to patient size (including appropriate  matching for site specific examination) or use of iterative reconstruction  technique. No midline shift or extra-axial collection. No intracranial hemorrhage, mass  lesions or cortical infarct involving a major vessel. Presumed small vessel  ischemic disease deep white matter, mild. No skull fracture. Mild mucosal  disease in ethmoid air cells. Mastoid air cells are aerated. Impression No acute intracranial abnormalities. No hemorrhage. XRAY (Most Recent)      EKG Results for orders placed or performed in visit on 09/13/19   AMB POC EKG ROUTINE W/ 12 LEADS, INTER & REP     Status: None    Impression    See progress note. 2D ECHO 02/08/21   ECHO ADULT COMPLETE 02/09/2021 2/9/2021    Narrative · LV: Estimated LVEF is 55 - 60%. Normal cavity size and systolic function   (ejection fraction normal). Mild concentric hypertrophy. Wall motion:   normal. Age-appropriate left ventricular diastolic function. · LA: Left Atrium volume index is 30.57 mL/m2. · RV: Normal right ventricular size and function.   · TV: Mild tricuspid valve regurgitation is present. · PA: Pulmonary arterial systolic pressure is 34 mmHg. · MV: Mild mitral annular calcification.         Signed by: Hilary Bacon MD

## 2021-03-29 LAB
ANION GAP SERPL CALC-SCNC: 6 MMOL/L (ref 3–18)
ATRIAL RATE: 252 BPM
BACTERIA SPEC CULT: NORMAL
BUN SERPL-MCNC: 19 MG/DL (ref 7–18)
BUN/CREAT SERPL: 20 (ref 12–20)
CALCIUM SERPL-MCNC: 8.2 MG/DL (ref 8.5–10.1)
CALCULATED P AXIS, ECG09: 116 DEGREES
CALCULATED R AXIS, ECG10: -49 DEGREES
CALCULATED T AXIS, ECG11: -5 DEGREES
CHLORIDE SERPL-SCNC: 98 MMOL/L (ref 100–111)
CO2 SERPL-SCNC: 26 MMOL/L (ref 21–32)
CREAT SERPL-MCNC: 0.97 MG/DL (ref 0.6–1.3)
DIAGNOSIS, 93000: NORMAL
ECHO LV INTERNAL DIMENSION DIASTOLIC: 5.07 CM (ref 4.2–5.9)
ECHO LV INTERNAL DIMENSION SYSTOLIC: 3.1 CM
ECHO LV IVSD: 1.18 CM (ref 0.6–1)
ECHO LV MASS 2D: 233.4 G (ref 88–224)
ECHO LV MASS INDEX 2D: 106 G/M2 (ref 49–115)
ECHO LV POSTERIOR WALL DIASTOLIC: 1.18 CM (ref 0.6–1)
ECHO TV REGURGITANT MAX VELOCITY: 308.95 CM/S
ECHO TV REGURGITANT PEAK GRADIENT: 38.18 MMHG
GLUCOSE SERPL-MCNC: 103 MG/DL (ref 74–99)
POTASSIUM SERPL-SCNC: 3 MMOL/L (ref 3.5–5.5)
Q-T INTERVAL, ECG07: 422 MS
QRS DURATION, ECG06: 152 MS
QTC CALCULATION (BEZET), ECG08: 431 MS
SERVICE CMNT-IMP: NORMAL
SODIUM SERPL-SCNC: 130 MMOL/L (ref 136–145)
VENTRICULAR RATE, ECG03: 63 BPM

## 2021-03-29 PROCEDURE — 65660000000 HC RM CCU STEPDOWN

## 2021-03-29 PROCEDURE — 99232 SBSQ HOSP IP/OBS MODERATE 35: CPT | Performed by: INTERNAL MEDICINE

## 2021-03-29 PROCEDURE — P9047 ALBUMIN (HUMAN), 25%, 50ML: HCPCS | Performed by: INTERNAL MEDICINE

## 2021-03-29 PROCEDURE — 93308 TTE F-UP OR LMTD: CPT | Performed by: INTERNAL MEDICINE

## 2021-03-29 PROCEDURE — 2709999900 HC NON-CHARGEABLE SUPPLY

## 2021-03-29 PROCEDURE — 74011250637 HC RX REV CODE- 250/637: Performed by: HOSPITALIST

## 2021-03-29 PROCEDURE — 36415 COLL VENOUS BLD VENIPUNCTURE: CPT

## 2021-03-29 PROCEDURE — 92526 ORAL FUNCTION THERAPY: CPT

## 2021-03-29 PROCEDURE — 74011250637 HC RX REV CODE- 250/637: Performed by: NURSE PRACTITIONER

## 2021-03-29 PROCEDURE — 74011250637 HC RX REV CODE- 250/637: Performed by: INTERNAL MEDICINE

## 2021-03-29 PROCEDURE — 93325 DOPPLER ECHO COLOR FLOW MAPG: CPT | Performed by: INTERNAL MEDICINE

## 2021-03-29 PROCEDURE — 74011250636 HC RX REV CODE- 250/636: Performed by: INTERNAL MEDICINE

## 2021-03-29 PROCEDURE — 93321 DOPPLER ECHO F-UP/LMTD STD: CPT | Performed by: INTERNAL MEDICINE

## 2021-03-29 PROCEDURE — 97110 THERAPEUTIC EXERCISES: CPT

## 2021-03-29 PROCEDURE — 80048 BASIC METABOLIC PNL TOTAL CA: CPT

## 2021-03-29 RX ORDER — METOPROLOL TARTRATE 25 MG/1
12.5 TABLET, FILM COATED ORAL 2 TIMES DAILY
Status: DISCONTINUED | OUTPATIENT
Start: 2021-03-29 | End: 2021-03-31 | Stop reason: HOSPADM

## 2021-03-29 RX ORDER — ALBUMIN HUMAN 250 G/1000ML
25 SOLUTION INTRAVENOUS ONCE
Status: COMPLETED | OUTPATIENT
Start: 2021-03-29 | End: 2021-03-29

## 2021-03-29 RX ORDER — MIDODRINE HYDROCHLORIDE 5 MG/1
5 TABLET ORAL
Status: DISCONTINUED | OUTPATIENT
Start: 2021-03-29 | End: 2021-03-31

## 2021-03-29 RX ORDER — POTASSIUM CHLORIDE 750 MG/1
40 TABLET, FILM COATED, EXTENDED RELEASE ORAL DAILY
Status: DISCONTINUED | OUTPATIENT
Start: 2021-03-29 | End: 2021-03-30

## 2021-03-29 RX ADMIN — FUROSEMIDE 20 MG: 10 INJECTION, SOLUTION INTRAMUSCULAR; INTRAVENOUS at 09:56

## 2021-03-29 RX ADMIN — Medication 5 MG: at 21:26

## 2021-03-29 RX ADMIN — TIMOLOL MALEATE 1 DROP: 2.5 SOLUTION/ DROPS OPHTHALMIC at 08:07

## 2021-03-29 RX ADMIN — ALBUMIN (HUMAN) 25 G: 0.25 INJECTION, SOLUTION INTRAVENOUS at 16:19

## 2021-03-29 RX ADMIN — Medication 10 ML: at 05:42

## 2021-03-29 RX ADMIN — TIMOLOL MALEATE 1 DROP: 2.5 SOLUTION/ DROPS OPHTHALMIC at 21:31

## 2021-03-29 RX ADMIN — PROPAFENONE HYDROCHLORIDE 150 MG: 150 TABLET, FILM COATED ORAL at 08:05

## 2021-03-29 RX ADMIN — MIDODRINE HYDROCHLORIDE 10 MG: 5 TABLET ORAL at 08:00

## 2021-03-29 RX ADMIN — Medication 2 TABLET: at 18:02

## 2021-03-29 RX ADMIN — FERROUS SULFATE TAB 325 MG (65 MG ELEMENTAL FE) 325 MG: 325 (65 FE) TAB at 18:01

## 2021-03-29 RX ADMIN — MIDODRINE HYDROCHLORIDE 5 MG: 5 TABLET ORAL at 18:01

## 2021-03-29 RX ADMIN — POTASSIUM CHLORIDE 40 MEQ: 750 TABLET, FILM COATED, EXTENDED RELEASE ORAL at 09:33

## 2021-03-29 RX ADMIN — APIXABAN 5 MG: 5 TABLET, FILM COATED ORAL at 09:33

## 2021-03-29 RX ADMIN — APIXABAN 5 MG: 5 TABLET, FILM COATED ORAL at 21:26

## 2021-03-29 RX ADMIN — Medication 10 ML: at 07:58

## 2021-03-29 RX ADMIN — FAMOTIDINE 20 MG: 20 TABLET ORAL at 08:05

## 2021-03-29 RX ADMIN — Medication 10 ML: at 16:20

## 2021-03-29 RX ADMIN — MIDODRINE HYDROCHLORIDE 10 MG: 5 TABLET ORAL at 12:17

## 2021-03-29 RX ADMIN — Medication 10 ML: at 21:27

## 2021-03-29 RX ADMIN — MIRTAZAPINE 15 MG: 15 TABLET, FILM COATED ORAL at 21:26

## 2021-03-29 RX ADMIN — FUROSEMIDE 20 MG: 10 INJECTION, SOLUTION INTRAMUSCULAR; INTRAVENOUS at 18:02

## 2021-03-29 RX ADMIN — LEVOTHYROXINE SODIUM 25 MCG: 25 TABLET ORAL at 07:58

## 2021-03-29 RX ADMIN — FAMOTIDINE 20 MG: 20 TABLET ORAL at 18:01

## 2021-03-29 RX ADMIN — FERROUS SULFATE TAB 325 MG (65 MG ELEMENTAL FE) 325 MG: 325 (65 FE) TAB at 07:58

## 2021-03-29 RX ADMIN — METOPROLOL TARTRATE 12.5 MG: 25 TABLET, FILM COATED ORAL at 18:01

## 2021-03-29 RX ADMIN — TAMSULOSIN HYDROCHLORIDE 0.4 MG: 0.4 CAPSULE ORAL at 21:26

## 2021-03-29 RX ADMIN — Medication 5000 UNITS: at 09:00

## 2021-03-29 RX ADMIN — ALBUMIN (HUMAN) 25 G: 0.25 INJECTION, SOLUTION INTRAVENOUS at 07:58

## 2021-03-29 RX ADMIN — LATANOPROST 1 DROP: 50 SOLUTION OPHTHALMIC at 21:31

## 2021-03-29 RX ADMIN — Medication 2 TABLET: at 08:05

## 2021-03-29 NOTE — PROGRESS NOTES
1527- Bedside and Verbal shift change report given to YOSVANY Barber (oncoming nurse) by Nancy Mehta LPN. Report included the following information SBAR, Kardex, Intake/Output, MAR, Recent Results and Cardiac Rhythm A fib. Assumed care for pt, Alert and resting in bed with no complaints of pain. Bed in lowest position, call bell within reach. 1619- Albumin IV infusion started      1801- PM PO meds given. Pt tolerated and swallowed well. 1930- Bedside and Verbal shift change report given to Rocky Jones RN (oncoming nurse) by YOSVANY Barber (offgoing nurse). Report included the following information SBAR, Kardex, Intake/Output, MAR and Recent Results.

## 2021-03-29 NOTE — PROGRESS NOTES
[facility]      CARDIOLOGY PROGRESS NOTE  RECS:      1. Atrial flutter, paroxysmal: Status post cardioversion 3/23/2021.back on atrial flutter rate controlled. Discontinue propafenone. Continue low dose bb. 3.2 second pause 3/27/21 @13:36. No further pauses seen. 2. 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 Watch on telemetry for any advanced AV blocks-stable   3. Congestive heart failure, chronic diastolic- better good diuresis. Continue lasix 20 mg iv bid. NT pro BNP >7000 -limited echo shows EF 55%  4. Orthostatic hypotension with history of falls for 1 week- no orthostatic BP done today. discussed with nursing Continue to Check orthostatic  3 times a day. Continue compression stockings. Reduced midodrine. 5. Fever: Possible sepsis. Resolved   6. Dysphagia- being follow by ST on honey thick liquids and aspiration precautions   7. UTI/urinary retention- requiring indwelling catheter- tx per medical team      Patient diuresing well. Change to Lasix p.o. from tomorrow. Discontinue propafenone as patient remains in atrial fibrillation. Resume low-dose metoprolol. No significant pauses noted in the last 24 hours.       EKG Results     Procedure 720 Value Units Date/Time    EKG, 12 LEAD, SUBSEQUENT [[id number]] Collected: 03/28/21 1430    Order Status: Completed Updated: 03/29/21 1131     Ventricular Rate 63 BPM      Atrial Rate 252 BPM      QRS Duration 152 ms      Q-T Interval 422 ms      QTC Calculation (Bezet) 431 ms      Calculated P Axis 116 degrees      Calculated R Axis -49 degrees      Calculated T Axis -5 degrees      Diagnosis --     Atrial flutter with 4:1 AV conduction  Right bundle branch block  Left anterior fascicular block  Bifascicular block  Abnormal ECG  When compared with ECG of 25-MAR-2021 10:34,  Atrial flutter has replaced Sinus rhythm  Confirmed by Mary Dean MD, Newton Desir [id number]) on 3/29/2021 11:31:04 AM      EKG, 12 LEAD, SUBSEQUENT [[id number]] Collected: 03/25/21 1034    Order Status: Completed Updated: 03/25/21 1732     Ventricular Rate 69 BPM      Atrial Rate 69 BPM      P-R Interval 290 ms      QRS Duration 158 ms      Q-T Interval 438 ms      QTC Calculation (Bezet) 469 ms      Calculated P Axis 54 degrees      Calculated R Axis -62 degrees      Calculated T Axis 6 degrees      Diagnosis --     Sinus rhythm with 1st degree AV block  Right bundle branch block  Left anterior fascicular block  Bifascicular block  Abnormal ECG  When compared with ECG of 24-MAR-2021 09:46,  No significant change was found  Confirmed by Nataly Larson MD, Alverna Aimee ([id number]) on 3/25/2021 5:32:10 PM      EKG, 12 LEAD, INITIAL [[id number]] Collected: 03/24/21 0946    Order Status: Completed Updated: 03/24/21 1328     Ventricular Rate 108 BPM      Atrial Rate 117 BPM      QRS Duration 136 ms      Q-T Interval 366 ms      QTC Calculation (Bezet) 490 ms      Calculated R Axis -59 degrees      Calculated T Axis 17 degrees      Diagnosis --     Suspect sinus tach with RBBB  Left axis deviation  Abnormal ECG  When compared with ECG of 23-MAR-2021 16:13,  No significant change was found  Confirmed by Cristal Cameron M.D., [address] [place] [address] [id number]) on 3/24/2021 1:28:24 PM      EKG, 12 LEAD, INITIAL [[id number]] Collected: 03/23/21 1613    Order Status: Completed Updated: 03/24/21 1244     Ventricular Rate 109 BPM      Atrial Rate 109 BPM      P-R Interval 256 ms      QRS Duration 142 ms      Q-T Interval 366 ms      QTC Calculation (Bezet) 492 ms      Calculated R Axis -59 degrees      Calculated T Axis 41 degrees      Diagnosis --     Sinus tachycardia with 1st degree AV block  Right bundle branch block  Left anterior fascicular block  Bifascicular block  Septal infarct (cited on or before 12-JUL-2010)  Abnormal ECG  When compared with ECG of 23-MAR-2021 10:04,  Questionable change in initial forces of Septal leads  Confirmed by Cristal Cameron M.D., [address] [place] [address] ([id number]) on 3/24/2021 12:43:58 PM          XR Results (most recent):      02/08/21   ECHO ADULT COMPLETE 02/09/2021 2/9/2021    Narrative · LV: Estimated LVEF is 55 - 60%. Normal cavity size and systolic function   (ejection fraction normal). Mild concentric hypertrophy. Wall motion:   normal. Age-appropriate left ventricular diastolic function. · LA: Left Atrium volume index is 30.57 mL/m2. · RV: Normal right ventricular size and function. · TV: Mild tricuspid valve regurgitation is present. · PA: Pulmonary arterial systolic pressure is 34 mmHg. · MV: Mild mitral annular calcification. Signed by: Abimael Soto MD       02/08/21   NUCLEAR CARDIAC STRESS TEST 02/08/2021 2/8/2021    Narrative · Baseline ECG: Atrial fibrillation, atrial flutter, Nonspecific T wave   changes. · Negative stress test.  · Gated SPECT: Left ventricular function post-stress was normal.   Calculated ejection fraction is 76%. There is no evidence of transient   ischemic dilation (TID). The TID ratio is 1.5.   · Myocardial perfusion imaging supports a low risk stress test.  · Left ventricular perfusion is normal.        Signed by: Abimael Soto MD            ASSESSMENT:  Hospital Problems  Date Reviewed: 3/23/2021          Codes Class Noted POA    Atrial flutter ([facility] [address] ICD-10-CM: V27.00  ICD-9-CM: 427.32  3/23/2021 Yes        * (Principal) Orthostatic hypotension ICD-10-CM: I95.1  ICD-9-CM: 458.0  3/23/2021 Unknown        Fever ICD-10-CM: R50.9  ICD-9-CM: 780.60  3/23/2021 Unknown        RBBB (right bundle branch block with left anterior fascicular block) ICD-10-CM: I45.2  ICD-9-CM: 426.52  9/13/2019 Yes        Acute on chronic diastolic congestive heart failure ([facility] [address] ICD-10-CM: I50.33  ICD-9-CM: 428.33, 428.0  7/19/2017 No    Overview Addendum 8/31/2017 12:09 PM by Abimael Soto MD     8/17 no edema today; 7/17 adv to stop all herbal OTC products for now                     SUBJECTIVE:  No chest pain  SOB is better    OBJECTIVE:    VS:   Visit Vitals  BP (!) 143/83 (BP 1 Location: Left upper arm, BP Patient Position: At rest)   Pulse 61   Temp 97.1 °F (36.2 °C)   Resp 20   Ht 6' (1.829 m)   Wt 98 kg (216 lb 1.6 oz)   SpO2 97%   BMI 29.31 kg/m²         Intake/Output Summary (Last 24 hours) at 3/29/2021 1549  Last data filed at 3/29/2021 0819  Gross per 24 hour   Intake 100 ml   Output 3150 ml   Net -3050 ml     TELE: atrial flutter      General: alert and in mild distress  HENT: Normocephalic, atraumatic. Normal external eye. Eyelid edematous  Neck :  no bruit, no JVD  Cardiac:  regular rate and rhythm, tachycardia  Chest/Lungs:chest clear, no wheezing, rales. Abdomen: Soft, nontender, no masses  Extremities: trace BLE edema  peripheral pulses present      Labs: Results:       Chemistry Recent Labs     03/29/21  0409 03/28/21  0148 03/27/21  0237   * 48* 86   * 134* 133*   K 3.0* 3.4* 3.6   CL 98* 101 103   CO2 26 21 21   BUN 19* 22* 26*   CREA 0.97 0.78 0.87   CA 8.2* 8.3* 8.2*   MG  --  2.0  --    PHOS  --  2.6  --    AGAP 6 12 9   BUCR 20 28* 30*      CBC w/Diff Recent Labs     03/28/21  0148 03/27/21  0237   WBC 8.0 7.0   RBC 3.18* 2.92*   HGB 9.8* 9.0*   HCT 28.7* 26.3*    164      Cardiac Enzymes No results for input(s): CPK, CKND1, ARMEN in the last 72 hours. No lab exists for component: CKRMB, TROIP   Coagulation No results for input(s): PTP, INR, APTT, INREXT, INREXT in the last 72 hours. Lipid Panel No results found for: CHOL, CHOLPOCT, CHOLX, CHLST, CHOLV, 311154, HDL, HDLP, LDL, LDLC, DLDLP, 112025, VLDLC, VLDL, TGLX, TRIGL, TRIGP, TGLPOCT, CHHD, CHHDX   BNP No results for input(s): BNPP in the last 72 hours. Liver Enzymes No results for input(s): TP, ALB, TBIL, AP in the last 72 hours.     No lab exists for component: SGOT, GPT, DBIL   Digoxin    Thyroid Studies Lab Results   Component Value Date/Time    TSH 1.51 03/27/2021 02:37 AM              DOUGIE Thomas    I have independently evaluated and examined the patient. All relevant labs and testing data's are reviewed. Care plan discussed and updated after review.     Ken Carballo MD

## 2021-03-29 NOTE — PROGRESS NOTES
Met with pt who is now agreeable to SNF. FOC obtained for [facility] facilities. Asked pt if he would like this writer to contact spouse and pt declined. Pt matched in [facility] and [facility].  ANTONY Spencer, [place]- [phone]

## 2021-03-29 NOTE — PROGRESS NOTES
Per Kaleigh Carmen (JESSICA) uploaded SNF short form to area facilities in [place]. Informed Kaleigh Carmen, SNF short form have been uploaded in [place] and referral sent.

## 2021-03-29 NOTE — PROGRESS NOTES
Problem: Dysphagia (Adult)  Goal: *Acute Goals and Plan of Care (Insert Text)  Description: Patient will:  1. Tolerate PO trials with 0 s/s overt distress in 4/5 trials  2. Participate in training and education related to continued aspiration risk, diet recs and compensatory strategies   3. Perform oral-motor/laryngeal exercises to increase oropharyngeal swallow function with min cues  4. Complete an objective swallow study (i.e., MBSS) to assess swallow integrity, r/o aspiration, and determine of safest LRD, min A-met 3/24/21    Recommend:   Regular diet with honey thick liquids   Meds as tolerated   Aspiration precautions  HOB >45 degrees during all intake and for at least 30 min after po   Small bites/sips, Slow rate of intake   Double swallow per bite/sip   Oral care three times daily   Outcome: Not Progressing Towards Goal     SPEECH LANGUAGE PATHOLOGY DYSPHAGIA TREATMENT    Patient: Jennifer Pham (89 y.o. male)  Date: 3/29/2021  Diagnosis: Fever [R50.9]  Orthostatic hypotension [I95.1] Orthostatic hypotension  Precautions: Fall, Aspiration  PLOF: As per H&P      ASSESSMENT:  Pt seen for follow up dysphagia treatment, reporting strong dislike of honey thick liquids. Pt demo immediate weak cough/facial redness with trials of thin and NTL. Continues to tolerate HTL with no overt s/sx aspiration. Pt completed x1 effortful swallow given max cues/encouragement. Pt with overall poor endurance, continues to be at high risk of aspiration with thin liquids. Recommend continue current diet. Will continue to follow as indicated for further dysphagia management. D/w pt and nursing. Pt verbalized understanding; however, question understanding of deficits.       Progression toward goals:  []         Improving appropriately and progressing toward goals  []         Improving slowly and progressing toward goals  [x]         Not making progress toward goals and plan of care will be adjusted     PLAN:  Recommendations and Planned Interventions:  Patient continues to benefit from skilled intervention to address the above impairments. Continue treatment per established plan of care. Discharge Recommendations:  Skilled Nursing Facility     SUBJECTIVE:   Patient stated I can't drink it    OBJECTIVE:   Cognitive and Communication Status:  Neurologic State: Drowsy  Orientation Level: Oriented to person, Oriented to situation  Cognition: Follows commands  Perception: Appears intact  Perseveration: No perseveration noted  Safety/Judgement: Fall prevention  Dysphagia Treatment:  Oral Assessment:  Oral Assessment  Labial: No impairment  Dentition: Natural  Oral Hygiene: Good  Lingual: No impairment  Velum: No impairment  Mandible: No impairment  P.O. Trials:   Patient Position: [facility] at [facility]   Vocal quality prior to P.O.: Low volume   Consistency Presented: Honey thick liquid, Nectar thick liquid, Thin liquid    How Presented: Self-fed/presented, Cup/sip, Successive swallows   Bolus Acceptance: No impairment   Bolus Formation/Control: No impairment   Propulsion: No impairment   Oral Residue: None   Initiation of Swallow: No impairment   Laryngeal Elevation: Decreased   Aspiration Signs/Symptoms: Weak cough with thin and nectar thick liquids    Pharyngeal Phase Characteristics: Poor endurance   Effective Modifications: Small sips and bites   Cues for Modifications:  Moderate   Oral Phase Severity: No impairment   Pharyngeal Phase Severity : Moderately severe     PAIN:  Start of Tx: not rated   End of Tx: not rated      After treatment:   []              Patient left in no apparent distress sitting up in chair  [x]              Patient left in no apparent distress in bed  [x]              Call bell left within reach  [x]              Nursing notified  []              Family present  []              Caregiver present  []              Bed alarm activated      COMMUNICATION/EDUCATION:   [x] Aspiration precautions; swallow safety; compensatory techniques  []        Patient/family able to participate in training and education   []  Patient unable to participate in training and education, education ongoing with staff   [x] Patient understands goals and intent of therapy; neutral about participation     Syeda Webber M.S., [id number] [facility]  Speech-Language Pathologist

## 2021-03-29 NOTE — PROGRESS NOTES
Problem: Self Care Deficits Care Plan (Adult)  Goal: *Acute Goals and Plan of Care (Insert Text)  Description: Occupational Therapy Goals  Initiated 3/25/2021 within 7 day(s). 1.  Patient will perform grooming/functional activity standing for 3-5 minutes if BP permits with modified independence, F+ balance. 2.  Patient will perform upper body dressing with modified independence. 3.  Patient will perform lower body dressing with moderate assistance using AE prn.  4.  Patient will perform toilet transfers with minimal assistance/contact guard assist.  5.  Patient will perform all aspects of toileting with moderate assistance . 6. Patient will participate in upper extremity therapeutic exercise/activities with modified independence for 8 minutes. 7.  Patient will utilize energy conservation techniques during functional activities with verbal cues. Prior Level of Function: Patient needed assist with LB self-care from wife for past month. Prior til month ago, patient was independent with tasks. Patient uses rollator for functional mobility PTA. Outcome: Progressing Towards Goal   OCCUPATIONAL THERAPY TREATMENT    Patient: Ana De La Torre (35 y.o. male)  Date: 3/29/2021  Diagnosis: Fever [R50.9]  Orthostatic hypotension [I95.1] Orthostatic hypotension       Precautions: Fall, Aspiration  PLOF: Patient needed assist with LB self-care from wife for past month. Prior til month ago, patient was independent with tasks. Patient uses rollator for functional mobility PTA. Chart, occupational therapy assessment, plan of care, and goals were reviewed. ASSESSMENT:  Pt presented supine in bed upon therapist arrival with eyes closed, he was easily aroused by verbal stimuli. Pt reports he wanted to sleep but agreeable for UE exercises @ bed level. He engaged in B UE TherEx and was encouraged to perform throughout the day. He tolerated exercises well however required increased time 2/2 fatigue.  Pt was provided with EC/WS techniques to increase safety and (I) during all ADL tasks, pt returned understanding through verbalization. Pt left for comfort with HOB elevated, bed alarm active, and all needs left within reach. Progression toward goals:  []          Improving appropriately and progressing toward goals  [x]          Improving slowly and progressing toward goals  []          Not making progress toward goals and plan of care will be adjusted     PLAN:  Patient continues to benefit from skilled intervention to address the above impairments. Continue treatment per established plan of care. Discharge Recommendations:  SNF  Further Equipment Recommendations for Discharge:  RW     SUBJECTIVE:   Patient stated  I want to sleep. \"     OBJECTIVE DATA SUMMARY:   Cognitive/Behavioral Status:  Neurologic State: Drowsy  Orientation Level: Oriented to person, Oriented to situation  Cognition: Follows commands  Safety/Judgement: Fall prevention      Balance:  Sitting: Intact    UE Therapeutic Exercises:   BUE ex with red t-band 4 x 15 reaching in mult planes to increase strength and ROM for ADL carryover. Pain:  Pain level pre-treatment: 0/10   Pain level post-treatment: 0/10    Activity Tolerance:    Fair due to fatigue  Please refer to the flowsheet for vital signs taken during this treatment. After treatment:   []  Patient left in no apparent distress sitting up in chair  [x]  Patient left in no apparent distress in bed  [x]  Call bell left within reach  [x]  Nursing notified  []  Caregiver present  [x]  Bed alarm activated    COMMUNICATION/EDUCATION:   [x] Role of Occupational Therapy in the acute care setting  [] Home safety education was provided and the patient/caregiver indicated understanding. [] Patient/family have participated as able in working towards goals and plan of care. [x] Patient/family agree to work toward stated goals and plan of care.   [] Patient understands intent and goals of therapy, but is neutral about his/her participation. [] Patient is unable to participate in goal setting and plan of care.       Thank you for this referral.  SAIDA Ledesma  Time Calculation: 12 mins

## 2021-03-29 NOTE — ROUTINE PROCESS
Bedside and Verbal shift change report given to [facility] [place] LPN (oncoming nurse) by Rolando Monroe RN (offgoing nurse). Report included the following information SBAR, Kardex, Intake/Output, MAR, Recent Results and Med Rec Status. Patient resting quietly in bed.

## 2021-03-29 NOTE — ROUTINE PROCESS
[facility] and [facility] ([facility] nurse). Report included the following information; SBAR, MAR, LABS, Intake/output, Kardex, and summary of care. Patient resting in bed quietly with the call light in hand. No noted distress at this time.

## 2021-03-29 NOTE — PROGRESS NOTES
Hospitalist Progress Note    Patient: Chiquis Patterson Age: 81 y.o. : 1935 MR#: [id number] SSN: [id number]  Date/Time: 3/29/2021 10:55 AM    DOA: 3/23/2021  PCP: Emili Stack MD    Subjective:     Patient stated \"I want water. I cannot drink that stuff. \"  Education given to him about his aspiration with thin liquid, he stated that he was not coughing with drinking water before. He does not believe that he has problem drinking water. This MD attempt to give him thin liquid and thereafter he drank and start coughing heavily. This MD expressed to him again that the cough was a sign of aspiration. He still does not care because he wants water. Note he failed his MBS with thin liquid     Still with shortness of breath on exertion. Only able to pull 250cc on ICS. Has lasix yesterday with good urinary output. Talavera was replaced due to his recurrent urinary retention     CXR with vascular congestion   Normal TSH, FT4, normal random cortisol level     Had tolerate Mirtazapine yesterday. Still feel depressed   He wants to drink thin liquid rather than thickening      His SBP is better, metoprolol has been held by previous team.   Midodrine tolerated at high dosing  He remains on Eliquis     Interval Hospital Course:      ROS:  No current fever/chills, no headache, no dizziness, no facial pain, no sinus congestion,   No swallowing pain, No chest pain, no palpitation, + shortness of breath, no abd pain,  No diarrhea, no urinary complaint, no leg pain or swelling    Assessment/Plan:       1. Dysphagia of thin liquid, failed MBS with thin liquid       He remains at risk for aspiration, bedside evaluation with thin water this AM show he was coughing heavily after his drink. 2.   Acute on chronic diastolic CHF, elevated proBNP        _Echo with preserved EF, mild concentric hypertrophy, mild TR   3. Paroxysmal Atrial flutter s/p cardioversion per Dr. Jacinto Iglesias, resolved   4.    Orthostatic hypotension, despite fluid resuscitation   5. Hypothyroidism, on replacement   6. Hyponatremia, worsened with diuresis   7. SIRS on presentation, no clear infectious process         FEVER resolved   8. Normocytic anemia   9. Former smoker  8. Acute urinary retention, now with recurrent retention, saba placed back today   11. Bacteruria, no evidence of infection, he finished 4 days of ceftriaxone   12. Physical deconditioning with Fall at home   13. Depression  14. Hypokalemia       I spoke with Mr Swati Chinchilla about the important of adherence to his thickened liquid as it prevents aspiration and potential for injury to his lungs. He refused to believe this as he continue to ask for thin water only. I spoke with his son Mitchell Yo [phone]) with regard to this event and asked that they have discussion about goal of care. If his father understand the risk of thin liquid but still wants it then he should be able to have it. Mitchell Yo agree to Palliative care team discussion on his father's goal of care. CT head reviewed, no stroke in the past week. Will continue low dose of lasix and albumin today. Will follow up with cardiology for further care recommendation   Continue with his current Propafenone and Eliquis per cardiology. Lopressor on hold due to low BP. Cont Midodrine  Cont pepcid. Melatonin. continue mirtazapine QHS   Cont synthroid   Stop Ceftriaxone as there is no clear source of infection (add probiotic), no diarrhea   ICS   OOB  PT/OT. cont iron supplement     Full code  Spoke with family [phone] with clinical updates and plane of care.        Additional Notes:    Time spent >35 minutes    Case discussed with:  [x]Patient  [x]Family  [x]Nursing  [x]Case Management  DVT Prophylaxis:  []Lovenox  [x]Eliquis   []SCDs  []Coumadin   []On Heparin gtt    Signed By: Georgiana Phalen, MD     March 29, 2021 10:55 AM              Objective:   VS:   Visit Vitals  /74 (BP 1 Location: Left upper arm, BP Patient Position: At rest)   Pulse 64   Temp 99.1 °F (37.3 °C)   Resp 20   Ht 6' (1.829 m)   Wt 98 kg (216 lb 1.6 oz)   SpO2 97%   BMI 29.31 kg/m²      Tmax/24hrs: Temp (24hrs), Av.9 °F (37.2 °C), Min:97.9 °F (36.6 °C), Max:100 °F (37.8 °C)      Intake/Output Summary (Last 24 hours) at 3/29/2021 1055  Last data filed at 3/29/2021 9410  Gross per 24 hour   Intake 100 ml   Output 3950 ml   Net -3850 ml       Tele: sinus  General:  Cooperative, Not in acute distress, speaks in full sentence while in bed  HEENT: PERRL, EOMI, supple neck, no JVD, dry oral mucosa  Cardiovascular: S1S2 regular, no rub/gallop   Pulmonary:  air entry bilaterally, no wheezing, no crackle  GI:  Soft, non tender, non distended, +bs, no guarding   Extremities:  No pedal edema, +distal pulses appreciated   Neuro: AOx3, moving all extremities, no gross deficit.      Additional:       Current Facility-Administered Medications   Medication Dose Route Frequency    potassium chloride SR (KLOR-CON 10) tablet 40 mEq  40 mEq Oral DAILY    albumin human 25% (BUMINATE) solution 25 g  25 g IntraVENous ONCE    tamsulosin (FLOMAX) capsule 0.4 mg  0.4 mg Oral QHS    Lactobacillus Acidoph & Bulgar (FLORANEX) tablet 2 Tab  2 Tab Oral BID    furosemide (LASIX) injection 20 mg  20 mg IntraVENous BID    ferrous sulfate tablet 325 mg  1 Tab Oral BID WITH MEALS    cholecalciferol (VITAMIN D3) capsule 5,000 Units  5,000 Units Oral DAILY    melatonin (rapid dissolve) tablet 5 mg  5 mg Oral QHS    mirtazapine (REMERON) tablet 15 mg  15 mg Oral QHS    famotidine (PEPCID) tablet 20 mg  20 mg Oral BID    midodrine (PROAMATINE) tablet 10 mg  10 mg Oral TID WITH MEALS    [Held by provider] metoprolol tartrate (LOPRESSOR) tablet 25 mg  25 mg Oral BID    propafenone (RYTHMOL) tablet 150 mg  150 mg Oral TID    apixaban (ELIQUIS) tablet 5 mg  5 mg Oral BID    levothyroxine (SYNTHROID) tablet 25 mcg  25 mcg Oral ACB    timolol (TIMOPTIC) 0.25% ophthalmic solution  1 Drop Both Eyes BID    latanoprost (XALATAN) 0.005 % ophthalmic solution 1 Drop  1 Drop Both Eyes QHS    sodium chloride (NS) flush 5-40 mL  5-40 mL IntraVENous Q8H    sodium chloride (NS) flush 5-40 mL  5-40 mL IntraVENous PRN    acetaminophen (TYLENOL) tablet 650 mg  650 mg Oral Q6H PRN    Or    acetaminophen (TYLENOL) suppository 650 mg  650 mg Rectal Q6H PRN    polyethylene glycol (MIRALAX) packet 17 g  17 g Oral DAILY PRN    ondansetron (ZOFRAN) injection 4 mg  4 mg IntraVENous Q8H PRN            Lab/Data Review:  Labs: Results:       Chemistry Recent Labs     03/29/21  0409 03/28/21 0148 03/27/21 0237   * 48* 86   * 134* 133*   K 3.0* 3.4* 3.6   CL 98* 101 103   CO2 26 21 21   BUN 19* 22* 26*   CREA 0.97 0.78 0.87   BUCR 20 28* 30*   AGAP 6 12 9   CA 8.2* 8.3* 8.2*   PHOS  --  2.6  --      No results for input(s): TBIL, ALT, ALKP, TP, ALB, GLOB, AGRAT in the last 72 hours. No lab exists for component: SGOT   CBC w/Diff Recent Labs     03/28/21 0148 03/27/21 0237   WBC 8.0 7.0   RBC 3.18* 2.92*   HGB 9.8* 9.0*   HCT 28.7* 26.3*   MCV 90.3 90.1   MCH 30.8 30.8   MCHC 34.1 34.2   RDW 13.2 13.3    164      Coagulation No results for input(s): PTP, INR, APTT, INREXT, INREXT in the last 72 hours.     Iron/Ferritin Lab Results   Component Value Date/Time    Iron 28 (L) 03/27/2021 02:37 AM    TIBC 204 (L) 03/27/2021 02:37 AM    Iron % saturation 14 (L) 03/27/2021 02:37 AM    Ferritin 761 (H) 03/27/2021 02:37 AM       BNP    Cardiac Enzymes Lab Results   Component Value Date/Time     03/23/2021 03:20 PM    CK - MB 1.6 03/23/2021 03:20 PM    CK-MB Index 1.0 03/23/2021 03:20 PM    Troponin-I, QT 0.03 03/23/2021 03:20 PM        Lactic Acid    Thyroid Studies          All Micro Results     Procedure Component Value Units Date/Time    CULTURE, BLOOD [[id number]] Collected: 03/23/21 1956    Order Status: Completed Specimen: Blood Updated: 03/29/21 1121     Special Requests: NO SPECIAL REQUESTS        Culture result: NO GROWTH 6 DAYS       CULTURE, BLOOD [[id number]] Collected: 03/23/21 1510    Order Status: Completed Specimen: Blood Updated: 03/29/21 0730     Special Requests: NO SPECIAL REQUESTS        Culture result: NO GROWTH 6 DAYS       CULTURE, URINE [[id number]] Collected: 03/28/21 1500    Order Status: Completed Specimen: Cath Urine Updated: 03/28/21 2022    C. DIFFICILE AG & TOXIN A/B [[id number]]     Order Status: Canceled Specimen: Stool     CULTURE, URINE [[id number]] Collected: 03/25/21 0730    Order Status: Completed Specimen: Urine from Clean catch Updated: 03/26/21 1042     Special Requests: NO SPECIAL REQUESTS        Culture result: No growth (<1,000 CFU/ML)       CULTURE, URINE [[id number]] Collected: 03/24/21 1100    Order Status: Canceled Specimen: Urine from Clean catch     COVID-19 RAPID TEST [[id number]] Collected: 03/24/21 0126    Order Status: Completed Specimen: Nasopharyngeal Updated: 03/24/21 0306     Specimen source Nasopharyngeal        COVID-19 rapid test Not detected        Comment: Rapid Abbott ID Now       Rapid NAAT:  The specimen is NEGATIVE for SARS-CoV-2, the novel coronavirus associated with COVID-19. Negative results should be treated as presumptive and, if inconsistent with clinical signs and symptoms or necessary for patient management, should be tested with an alternative molecular assay. Negative results do not preclude SARS-CoV-2 infection and should not be used as the sole basis for patient management decisions. This test has been authorized by the FDA under an Emergency Use Authorization (EUA) for use by authorized laboratories. Fact sheet for Healthcare Providers: ConventionUpdate.co.nz  Fact sheet for Patients: ConventionUpdate.co.nz       Methodology: Isothermal Nucleic Acid Amplification                 Images:    CT (Most Recent).  CT Results (most recent):  Results from [facility] [place] Encounter encounter on 03/23/21   CT HEAD WO CONT    Narrative CT Of The Head Without Contrast    CPT CODE: 10033    REASON FOR EXAM: \"Stroke\"    COMPARISON: 3/23/2021 head CT    TECHNIQUE:  Axial CT images of the head from the skull base to the vertex  without IV contrast. CT dose reduction was achieved through use of a  standardized protocol tailored for this examination and automatic exposure  control for dose modulation. FINDINGS:   Moderate amount of streak artifacts through the lower brain without repeated  images. Results in a loss of details particularly through the brainstem and  cerebellum. No abnormal extra-axial fluid collections or findings of acute intracranial  hemorrhage. Mild global cerebral and cerebellar volume loss not remarkable  relative the age. No hydrocephalus. Periventricular white matter lucencies  consistent with mild chronic microvascular ischemic changes are stable from  previous CT head. No findings of an acute or chronic infarct. No mass or midline  shift. No significant paranasal sinus disease. Opacified right posterior ethmoid  cell is unchanged, sinuses are otherwise clear. Impression 1. No acute intracranial abnormality or change from the prior exam.  2. Mild chronic microvascular ischemic changes in the white matter. XRAY (Most Recent)      EKG Results for orders placed or performed in visit on 09/13/19   [facility] POC EKG ROUTINE W/ 12 LEADS, INTER & REP     Status: None    Impression    See progress note. 2D ECHO 02/08/21   ECHO ADULT COMPLETE 02/09/2021 2/9/2021    Narrative · LV: Estimated LVEF is 55 - 60%. Normal cavity size and systolic function   (ejection fraction normal). Mild concentric hypertrophy. Wall motion:   normal. Age-appropriate left ventricular diastolic function. · LA: Left Atrium volume index is 30.57 mL/m2. · RV: Normal right ventricular size and function. · TV: Mild tricuspid valve regurgitation is present.   · PA: Pulmonary arterial systolic pressure is 34 mmHg. · MV: Mild mitral annular calcification.         Signed by: Mary Enriquez MD

## 2021-03-30 PROBLEM — R53.81 PHYSICAL DECONDITIONING: Status: ACTIVE | Noted: 2021-03-30

## 2021-03-30 PROBLEM — R13.10 DYSPHAGIA: Status: ACTIVE | Noted: 2021-03-30

## 2021-03-30 LAB
ANION GAP SERPL CALC-SCNC: 11 MMOL/L (ref 3–18)
BUN SERPL-MCNC: 20 MG/DL (ref 7–18)
BUN/CREAT SERPL: 20 (ref 12–20)
CALCIUM SERPL-MCNC: 8.1 MG/DL (ref 8.5–10.1)
CHLORIDE SERPL-SCNC: 97 MMOL/L (ref 100–111)
CO2 SERPL-SCNC: 24 MMOL/L (ref 21–32)
CREAT SERPL-MCNC: 0.98 MG/DL (ref 0.6–1.3)
GLUCOSE SERPL-MCNC: 106 MG/DL (ref 74–99)
MAGNESIUM SERPL-MCNC: 1.7 MG/DL (ref 1.6–2.6)
POTASSIUM SERPL-SCNC: 3 MMOL/L (ref 3.5–5.5)
SODIUM SERPL-SCNC: 132 MMOL/L (ref 136–145)

## 2021-03-30 PROCEDURE — 36415 COLL VENOUS BLD VENIPUNCTURE: CPT

## 2021-03-30 PROCEDURE — 65660000000 HC RM CCU STEPDOWN

## 2021-03-30 PROCEDURE — 83735 ASSAY OF MAGNESIUM: CPT

## 2021-03-30 PROCEDURE — 74011250637 HC RX REV CODE- 250/637: Performed by: HOSPITALIST

## 2021-03-30 PROCEDURE — 74011250637 HC RX REV CODE- 250/637: Performed by: NURSE PRACTITIONER

## 2021-03-30 PROCEDURE — 99233 SBSQ HOSP IP/OBS HIGH 50: CPT | Performed by: INTERNAL MEDICINE

## 2021-03-30 PROCEDURE — 97530 THERAPEUTIC ACTIVITIES: CPT

## 2021-03-30 PROCEDURE — 92526 ORAL FUNCTION THERAPY: CPT

## 2021-03-30 PROCEDURE — 74011250637 HC RX REV CODE- 250/637: Performed by: INTERNAL MEDICINE

## 2021-03-30 PROCEDURE — 99232 SBSQ HOSP IP/OBS MODERATE 35: CPT | Performed by: INTERNAL MEDICINE

## 2021-03-30 PROCEDURE — 97112 NEUROMUSCULAR REEDUCATION: CPT

## 2021-03-30 PROCEDURE — 99223 1ST HOSP IP/OBS HIGH 75: CPT | Performed by: NURSE PRACTITIONER

## 2021-03-30 PROCEDURE — 80048 BASIC METABOLIC PNL TOTAL CA: CPT

## 2021-03-30 RX ORDER — LANOLIN ALCOHOL/MO/W.PET/CERES
400 CREAM (GRAM) TOPICAL DAILY
Status: DISCONTINUED | OUTPATIENT
Start: 2021-03-30 | End: 2021-03-31 | Stop reason: HOSPADM

## 2021-03-30 RX ORDER — POTASSIUM CHLORIDE 20 MEQ/1
40 TABLET, EXTENDED RELEASE ORAL 2 TIMES DAILY
Status: DISCONTINUED | OUTPATIENT
Start: 2021-03-30 | End: 2021-03-31 | Stop reason: HOSPADM

## 2021-03-30 RX ORDER — MIRTAZAPINE 15 MG/1
30 TABLET, FILM COATED ORAL
Status: DISCONTINUED | OUTPATIENT
Start: 2021-03-30 | End: 2021-03-31 | Stop reason: HOSPADM

## 2021-03-30 RX ORDER — FUROSEMIDE 20 MG/1
20 TABLET ORAL
Status: DISCONTINUED | OUTPATIENT
Start: 2021-03-30 | End: 2021-03-31 | Stop reason: HOSPADM

## 2021-03-30 RX ADMIN — MAGNESIUM GLUCONATE 500 MG ORAL TABLET 400 MG: 500 TABLET ORAL at 09:36

## 2021-03-30 RX ADMIN — Medication 5 MG: at 22:10

## 2021-03-30 RX ADMIN — Medication 5000 UNITS: at 09:36

## 2021-03-30 RX ADMIN — APIXABAN 5 MG: 5 TABLET, FILM COATED ORAL at 09:37

## 2021-03-30 RX ADMIN — MIDODRINE HYDROCHLORIDE 5 MG: 5 TABLET ORAL at 09:36

## 2021-03-30 RX ADMIN — Medication 10 ML: at 06:00

## 2021-03-30 RX ADMIN — METOPROLOL TARTRATE 12.5 MG: 25 TABLET, FILM COATED ORAL at 09:37

## 2021-03-30 RX ADMIN — FAMOTIDINE 20 MG: 20 TABLET ORAL at 18:02

## 2021-03-30 RX ADMIN — Medication 10 ML: at 18:04

## 2021-03-30 RX ADMIN — MIDODRINE HYDROCHLORIDE 5 MG: 5 TABLET ORAL at 18:03

## 2021-03-30 RX ADMIN — Medication 10 ML: at 22:11

## 2021-03-30 RX ADMIN — MIRTAZAPINE 30 MG: 15 TABLET, FILM COATED ORAL at 22:10

## 2021-03-30 RX ADMIN — POTASSIUM CHLORIDE 40 MEQ: 1500 TABLET, EXTENDED RELEASE ORAL at 09:36

## 2021-03-30 RX ADMIN — MIDODRINE HYDROCHLORIDE 5 MG: 5 TABLET ORAL at 12:22

## 2021-03-30 RX ADMIN — TAMSULOSIN HYDROCHLORIDE 0.4 MG: 0.4 CAPSULE ORAL at 22:10

## 2021-03-30 RX ADMIN — Medication 2 TABLET: at 09:37

## 2021-03-30 RX ADMIN — APIXABAN 5 MG: 5 TABLET, FILM COATED ORAL at 22:10

## 2021-03-30 RX ADMIN — LEVOTHYROXINE SODIUM 25 MCG: 25 TABLET ORAL at 06:01

## 2021-03-30 RX ADMIN — LATANOPROST 1 DROP: 50 SOLUTION OPHTHALMIC at 22:14

## 2021-03-30 RX ADMIN — POTASSIUM CHLORIDE 40 MEQ: 1500 TABLET, EXTENDED RELEASE ORAL at 18:03

## 2021-03-30 RX ADMIN — METOPROLOL TARTRATE 12.5 MG: 25 TABLET, FILM COATED ORAL at 18:03

## 2021-03-30 RX ADMIN — FERROUS SULFATE TAB 325 MG (65 MG ELEMENTAL FE) 325 MG: 325 (65 FE) TAB at 09:37

## 2021-03-30 RX ADMIN — FERROUS SULFATE TAB 325 MG (65 MG ELEMENTAL FE) 325 MG: 325 (65 FE) TAB at 18:03

## 2021-03-30 RX ADMIN — FAMOTIDINE 20 MG: 20 TABLET ORAL at 09:36

## 2021-03-30 RX ADMIN — TIMOLOL MALEATE 1 DROP: 2.5 SOLUTION/ DROPS OPHTHALMIC at 22:14

## 2021-03-30 RX ADMIN — Medication 2 TABLET: at 18:02

## 2021-03-30 NOTE — PROGRESS NOTES
Problem: Mobility Impaired (Adult and Pediatric)  Goal: *Acute Goals and Plan of Care (Insert Text)  Description: Physical Therapy Goals  Initiated 3/24/2021 and to be accomplished within 7 day(s)  1. Patient will move from supine to sit and sit to supine  in bed with minimal assistance/contact guard assist.    2.  Patient will transfer from bed to chair and chair to bed with minimal assistance/contact guard assist using the least restrictive device. 3.  Patient will perform sit to stand with minimal assistance/contact guard assist.  4.  Patient will ambulate with minimal assistance/contact guard assist for 25 feet with the least restrictive device. PLOF: Poor historian. Reports unable to walk past 2-3 weeks. Outcome: Progressing Towards Goal   PHYSICAL THERAPY TREATMENT    Patient: Sandra Canela (30 y.o. male)  Date: 3/30/2021  Diagnosis: Fever [R50.9]  Orthostatic hypotension [I95.1] Orthostatic hypotension  Precautions: Fall, Aspiration  ASSESSMENT:  Eyes open to voice. Oriented to person and place; disoriented to time. BP assessed during session for orthostatic hypotension; vitals below. Mod A for supine to sit with HOB elevated to max height. Seated EOB with good balance. Reports lightheadedness. Completed seated BLE AROM as noted below. Poor seated posture; unable to correct with tactile cues. Attempted sit to stand with bed height elevated and max A. Requires 4 attempts to achieve full standing; able to achieve buttock clearance with max A on all 4 attempts. Once standing max/mod A for static standing balance at ww. Poor standing posture with constant verbal cues for trunk, hip, and knee extension. Improves posture for 3-5 seconds with verbal and tactile cues. Returned to seated EOB. Performed lateral scooting at EOB with supervision and additional time. Mod A for sit to supine. Seated in bed with HOB elevated. Decreased insight to deficits and poor safety awareness.  Educated on need for RN assistance with mobility; verbalized understanding. Call bell in reach. BP supine HOB 30 degrees 106/58  BP seated EOB 88/52  BP standing 64/44  BP seated (post standing) 74/45  BP returned to supine HOB 30 degrees 90/42    Progression toward goals:   []      Improving appropriately and progressing toward goals  [x]      Improving slowly and progressing toward goals  []      Not making progress toward goals and plan of care will be adjusted     PLAN:  Patient continues to benefit from skilled intervention to address the above impairments. Continue treatment per established plan of care. Discharge Recommendations:  Zia Keita  Further Equipment Recommendations for Discharge:  rolling walker     SUBJECTIVE:   Patient stated Are we standing? Preeti Lopez    OBJECTIVE DATA SUMMARY:   Critical Behavior:  Neurologic State: Drowsy  Orientation Level: Oriented to person;Oriented to place; Disoriented to time  Cognition: Follows commands;Poor safety awareness     Psychosocial  Patient Behaviors: Cooperative  Functional Mobility:  Bed Mobility:  Supine to Sit: Moderate assistance;Bed Modified  Sit to Supine:  Moderate assistance  Scooting: Minimum assistance  Transfers:  Sit to Stand: Maximum assistance  Stand to Sit: Maximum assistance  Balance:   Sitting: Impaired  Sitting - Static: Good (unsupported)  Sitting - Dynamic: Good (unsupported)  Standing: Impaired  Standing - Static: Poor  Neuro Re-Education:  Seated balance 6 minutes total  Standing balance 2 minutes  Therapeutic Exercises:   Seated BLE AROM; hip flexion, knee extension, ankle pumps x10  Sit to stand x4 (requires 4 attempts with buttock clearance; however unable to achieve full standing until 4th attempt)    Pain:  Pain level pre-treatment: 0/10  Pain level post-treatment: 0/10     Activity Tolerance:   Fair    After treatment:   [] Patient left in no apparent distress sitting up in chair  [x] Patient left in no apparent distress in bed  [x] Call bell left within reach  [x] Nursing notified  [] Caregiver present  [] Bed alarm activated  [] SCDs applied      COMMUNICATION/EDUCATION:   [x]         Role of physical therapy in the acute care setting. [x]         Fall prevention education was provided and the patient/caregiver indicated understanding. [x]         Patient/family have participated as able in working toward goals and plan of care. [x]         Patient/family agree to work toward stated goals and plan of care. []         Patient understands intent and goals of therapy, but is neutral about his/her participation. []         Patient is unable to participate in stated goals/plan of care: ongoing with therapy staff.       Toni Vinson, PT   Time Calculation: 24 mins

## 2021-03-30 NOTE — PROGRESS NOTES
Discharge/Transition Planning    1005:Dr Nayeli Martin stated he spoke with son Suha Sun and he had picked facility. Called Suha Sun and he stated [facility]. They did accept and CM  Sent message in [employer] stating would like to accept bed offer  1445: UAI completed in [facility] website.  Pt not applying for Medicaid    Sarah Phelan RN BSN  Outcomes Manager    Pager # [phone]

## 2021-03-30 NOTE — PROGRESS NOTES
Problem: Patient Education: Go to Patient Education Activity  Goal: Patient/Family Education  Outcome: Progressing Towards Goal     Problem: Falls - Risk of  Goal: *Absence of Falls  Description: Document Tawny Dorcas Fall Risk and appropriate interventions in the flowsheet. Outcome: Progressing Towards Goal  Note: Fall Risk Interventions:  Mobility Interventions: Bed/chair exit alarm, Patient to call before getting OOB         Medication Interventions: Evaluate medications/consider consulting pharmacy, Patient to call before getting OOB, Bed/chair exit alarm    Elimination Interventions: Call light in reach              Problem: Patient Education: Go to Patient Education Activity  Goal: Patient/Family Education  Outcome: Progressing Towards Goal     Problem: Pressure Injury - Risk of  Goal: *Prevention of pressure injury  Description: Document Horace Scale and appropriate interventions in the flowsheet. Outcome: Progressing Towards Goal  Note: Pressure Injury Interventions:  Sensory Interventions: Assess changes in LOC, Keep linens dry and wrinkle-free, Maintain/enhance activity level, Minimize linen layers, Turn and reposition approx.  every two hours (pillows and wedges if needed)    Moisture Interventions: Absorbent underpads, Apply protective barrier, creams and emollients, Internal/External urinary devices, Maintain skin hydration (lotion/cream), Minimize layers    Activity Interventions: Increase time out of bed    Mobility Interventions: Pressure redistribution bed/mattress (bed type)    Nutrition Interventions: Document food/fluid/supplement intake, Discuss nutritional consult with provider    Friction and Shear Interventions: HOB 30 degrees or less                Problem: Patient Education: Go to Patient Education Activity  Goal: Patient/Family Education  Outcome: Progressing Towards Goal     Problem: Nutrition Deficit  Goal: *Optimize nutritional status  Outcome: Progressing Towards Goal     Problem: Risk for Spread of Infection  Goal: Prevent transmission of infectious organism to others  Description: Prevent the transmission of infectious organisms to other patients, staff members, and visitors.   Outcome: Progressing Towards Goal     Problem: Patient Education:  Go to Education Activity  Goal: Patient/Family Education  Outcome: Progressing Towards Goal

## 2021-03-30 NOTE — PROGRESS NOTES
Problem: Dysphagia (Adult)  Goal: *Acute Goals and Plan of Care (Insert Text)  Description:   Patient will:  1. Tolerate PO trials with 0 s/s overt distress in 4/5 trials  2. Participate in training and education related to continued aspiration risk, diet recs and compensatory strategies   3. Perform oral-motor/laryngeal exercises to increase oropharyngeal swallow function with min cues  4. Complete an objective swallow study (i.e., MBSS) to assess swallow integrity, r/o aspiration, and determine of safest LRD, min A-met 3/24/21    Recommend:   Regular diet with honey thick liquids   Meds as tolerated   Aspiration precautions  HOB >45 degrees during all intake and for at least 30 min after po   Small bites/sips, Slow rate of intake   Double swallow per bite/sip   Oral care three times daily   Outcome: Not Progressing Towards Goal   SPEECH LANGUAGE PATHOLOGY DYSPHAGIA TREATMENT    Patient: Kev Crawford (55 y.o. male)  Date: 3/30/2021  Diagnosis: Fever [R50.9]  Orthostatic hypotension [I95.1] Orthostatic hypotension       Precautions:  Fall, Aspiration  PLOF: As per H&P      ASSESSMENT:  Pt was seen at bedside for follow up dysphagia management. Copious amounts of thin liquids at bedside; liquids expelled. RN and pt educated on need for thickened liquids for decreased risk of aspiration. Pt verbalized comprehension; however, he stated that he does not like the thickened liquids. Pt refused completion of oropharyngeal swallow exercises despite max cues from SLP. Recommend to continue regular solid diet with honey-thick liquids, aspiration precautions, oral care TID, and meds as tolerated. Will follow up x 1-2 more visits as pt has been minimally participatory with SLP and is making limited progress towards goals/recs.      Progression toward goals:  []         Improving appropriately and progressing toward goals  []         Improving slowly and progressing toward goals  [x]         Not making progress toward goals and plan of care will be adjusted     PLAN:  Recommendations and Planned Interventions: See above  Patient continues to benefit from skilled intervention to address the above impairments. Continue treatment per established plan of care. Discharge Recommendations:  [address], and To Be Determined     SUBJECTIVE:   Patient stated I just want to go back to bed.     OBJECTIVE:   Cognitive and Communication Status:  Neurologic State: Drowsy  Orientation Level: Oriented to person, Oriented to place, Disoriented to time  Cognition: Follows commands, Poor safety awareness  Perception: Appears intact  Perseveration: No perseveration noted  Safety/Judgement: Fall prevention    Dysphagia Treatment: see above    PAIN:  Start of Tx: 0  End of Tx: 0     After treatment:   []              Patient left in no apparent distress sitting up in chair  [x]              Patient left in no apparent distress in bed  [x]              Call bell left within reach  [x]              Nursing notified  []              Family present  []              Caregiver present  []              Bed alarm activated    COMMUNICATION/EDUCATION:   [x] Aspiration precautions; swallow safety; compensatory techniques  []        Patient/family able to participate in training and education   []  Patient unable to participate in training and education, education ongoing with staff   [x] Patient understands goals and intent of therapy; neutral about participation     Thank you for this referral.    Nuvia Hanson M.S. CCC-SLP/L  Speech-Language Pathologist

## 2021-03-30 NOTE — PROGRESS NOTES
Hospitalist Progress Note    Patient: Nola Saavedra Age: 81 y.o. : 1935 MR#: [id number] SSN: [id number]  Date/Time: 3/30/2021 11:19 AM    DOA: 3/23/2021  PCP: Jackson Rodriguez MD    Subjective:     Patient stated that he want to participate in care today. He called his family this AM to stated that he will follow the doctor recommendations  He is willing to participate with PT/OT. He still wants to drink water but he knows he choke on drinking water  He feels he still have shortness of breath with exertion but no evidence of hypoxia. He tolerated IV lasix for the last 2 days. His propafenone has been stopped    Stable SBP. No evidence of orthostatic this AM  Midodrine decrease   He remains on Eliquis   Had tolerate Mirtazapine yesterday. Still feel depressed     Interval Hospital Course:  81 y.o male with HTN, hypothyroidism, recent Atrial flutter with controlled rate, on Eliquis, has on-going increasing shortness of breath, who underwent cardioversion in the cath lab 3/23/21 and sustained hypotension. He remained with symptomatic orthostatic hypotension after the procedure with low grade fever but no leukocytosis. He was initially admitted with resuming his Eliquis, started on Vancomycin/zosyn for suspected sepsis. His UA was dirty. His COVID-19 was negative. Cardiology started him on Propafenone with his Eliquis. Midodrine was added. Albumin was given and his metoprolol was held. His proBNP >7000, Echo with EF 55%. His orthostatic symptom slowly resolved. Over the weekend, he was had worsened proBNP and chest congestion, lasix IV was given. He tolerated well. He has been laying in bed and not participating in his care. Family was concern for depression vs new stroke since he also was found to have dysphagia to thin liquid. CT head on admission was negative. Repeat CT head was negative. Speech has been advised on honey thickened liquid but he refused to drink this.  In concern for depression, he was started on Mirtazapine which he tolerated well. He was noted to have acute urinary retention, he urine was negative for infection but he has finished 4 days of ceftriaxone. He continues to have recurrent symptom, hence saba was replaced. He was started on Tamsulosin       ROS:  No current fever/chills, no headache, no dizziness, no facial pain, no sinus congestion,   No swallowing pain, No chest pain, no palpitation, +shortness of breath, no abd pain,  No diarrhea, no urinary complaint, no leg pain or swelling    Assessment/Plan:       1. Dysphagia of thin liquid, failed MBS with thin liquid       He remains at risk for aspiration, bedside evaluation with thin water this AM show he was coughing heavily after his drink. 2.   Acute on chronic diastolic CHF, elevated proBNP        _Echo with preserved EF, mild concentric hypertrophy, mild TR   3. Paroxysmal Atrial flutter s/p cardioversion per Dr. Adele Becerra, resolved   4. Orthostatic hypotension, despite fluid resuscitation   5. Hypothyroidism, on replacement   6. Hyponatremia, worsened with diuresis   7. SIRS on presentation, no clear infectious process         FEVER resolved   8. Normocytic anemia   9. Former smoker  8. Acute urinary retention, now with recurrent retention, saba placed back today   11. Bacteruria, no evidence of infection, he finished 4 days of ceftriaxone   12. Physical deconditioning with Fall at home   13. Depression  14. Hypokalemia       I spoke with him again today to educate him on why he might be having shortness of breath, which including CHF treatment, physical deconditioning treatment, and prevention of aspiration that can cause harm to his lung. Encourage him ICS   Encourage to participate in PT/OT today.    Spoke with his son Anais Hendricks ([phone]) with clinical updates today and expressed my concern for failure to thrive if he persists in his not waking to follow dietary restriction   Awaiting family to take with Palliative care team for finalizing goal of care. Spoke with Cardiology for weaning off Laxis, or put in as prn   Resume metoprolol   Cont Midodrine at low dosing. Need to check orthostatic BP   Cont pepcid. Melatonin. continue mirtazapine QHS   Cont synthroid   Stop Ceftriaxone as there is no clear source of infection (add probiotic), no diarrhea   ICS   OOB  PT/OT. NOTE: CT head reviewed, no stroke in the past week. cont iron supplement       Full code  Spoke with family [phone] with clinical updates and plane of care. Additional Notes:    Time spent >35 minutes    Case discussed with:  [x]Patient  [x]Family  [x]Nursing  [x]Case Management  DVT Prophylaxis:  []Lovenox  [x]Eliquis   []SCDs  []Coumadin   []On Heparin gtt    Signed By: Yoel Mccurdy MD     2021 11:19 AM              Objective:   VS:   Visit Vitals  BP (!) 90/42 (BP 1 Location: Left upper arm, BP Patient Position: At rest)   Pulse 71   Temp 98.2 °F (36.8 °C)   Resp 18   Ht 6' (1.829 m)   Wt 96.8 kg (213 lb 6.5 oz)   SpO2 99%   BMI 28.94 kg/m²      Tmax/24hrs: Temp (24hrs), Av.2 °F (36.8 °C), Min:97.1 °F (36.2 °C), Max:99.5 °F (37.5 °C)      Intake/Output Summary (Last 24 hours) at 3/30/2021 1119  Last data filed at 3/30/2021 8208  Gross per 24 hour   Intake 200 ml   Output 1300 ml   Net -1100 ml       Tele: sinus  General:  Cooperative, Not in acute distress, speaks in full sentence while in bed  HEENT: PERRL, EOMI, supple neck, no JVD, dry oral mucosa  Cardiovascular: S1S2 regular, no rub/gallop   Pulmonary:  air entry bilaterally, no wheezing, no crackle  GI:  Soft, non tender, non distended, +bs, no guarding   Extremities:  No pedal edema, +distal pulses appreciated   Neuro: AOx3, moving all extremities, no gross deficit.      Additional:       Current Facility-Administered Medications   Medication Dose Route Frequency    magnesium oxide (MAG-OX) tablet 400 mg  400 mg Oral DAILY    mirtazapine (REMERON) tablet 30 mg  30 mg Oral QHS    potassium chloride (K-DUR, KLOR-CON) SR tablet 40 mEq  40 mEq Oral BID    furosemide (LASIX) tablet 20 mg  20 mg Oral DAILY PRN    metoprolol tartrate (LOPRESSOR) tablet 12.5 mg  12.5 mg Oral BID    midodrine (PROAMATINE) tablet 5 mg  5 mg Oral TID WITH MEALS    tamsulosin (FLOMAX) capsule 0.4 mg  0.4 mg Oral QHS    Lactobacillus Acidoph & Bulgar (FLORANEX) tablet 2 Tab  2 Tab Oral BID    ferrous sulfate tablet 325 mg  1 Tab Oral BID WITH MEALS    cholecalciferol (VITAMIN D3) capsule 5,000 Units  5,000 Units Oral DAILY    melatonin (rapid dissolve) tablet 5 mg  5 mg Oral QHS    famotidine (PEPCID) tablet 20 mg  20 mg Oral BID    apixaban (ELIQUIS) tablet 5 mg  5 mg Oral BID    levothyroxine (SYNTHROID) tablet 25 mcg  25 mcg Oral ACB    timolol (TIMOPTIC) 0.25% ophthalmic solution  1 Drop Both Eyes BID    latanoprost (XALATAN) 0.005 % ophthalmic solution 1 Drop  1 Drop Both Eyes QHS    sodium chloride (NS) flush 5-40 mL  5-40 mL IntraVENous Q8H    sodium chloride (NS) flush 5-40 mL  5-40 mL IntraVENous PRN    acetaminophen (TYLENOL) tablet 650 mg  650 mg Oral Q6H PRN    Or    acetaminophen (TYLENOL) suppository 650 mg  650 mg Rectal Q6H PRN    polyethylene glycol (MIRALAX) packet 17 g  17 g Oral DAILY PRN    ondansetron (ZOFRAN) injection 4 mg  4 mg IntraVENous Q8H PRN            Lab/Data Review:  Labs: Results:       Chemistry Recent Labs     03/30/21  0134 03/29/21  0409 03/28/21  0148   * 103* 48*   * 130* 134*   K 3.0* 3.0* 3.4*   CL 97* 98* 101   CO2 24 26 21   BUN 20* 19* 22*   CREA 0.98 0.97 0.78   BUCR 20 20 28*   AGAP 11 6 12   CA 8.1* 8.2* 8.3*   PHOS  --   --  2.6     No results for input(s): TBIL, ALT, ALKP, TP, ALB, GLOB, AGRAT in the last 72 hours.     No lab exists for component: SGOT   CBC w/Diff Recent Labs     03/28/21  0148   WBC 8.0   RBC 3.18*   HGB 9.8*   HCT 28.7*   MCV 90.3   MCH 30.8   MCHC 34.1   RDW 13.2         Coagulation No results for input(s): PTP, INR, APTT, INREXT, INREXT in the last 72 hours.     Iron/Ferritin Lab Results   Component Value Date/Time    Iron 28 (L) 03/27/2021 02:37 AM    TIBC 204 (L) 03/27/2021 02:37 AM    Iron % saturation 14 (L) 03/27/2021 02:37 AM    Ferritin 761 (H) 03/27/2021 02:37 AM       BNP    Cardiac Enzymes Lab Results   Component Value Date/Time     03/23/2021 03:20 PM    CK - MB 1.6 03/23/2021 03:20 PM    CK-MB Index 1.0 03/23/2021 03:20 PM    Troponin-I, QT 0.03 03/23/2021 03:20 PM        Lactic Acid    Thyroid Studies          All Micro Results     Procedure Component Value Units Date/Time    CULTURE, URINE [[id number]] Collected: 03/28/21 1500    Order Status: Completed Specimen: Cath Urine Updated: 03/29/21 1520     Special Requests: NO SPECIAL REQUESTS        Culture result: No growth (<1,000 CFU/ML)       CULTURE, BLOOD [[id number]] Collected: 03/23/21 1956    Order Status: Completed Specimen: Blood Updated: 03/29/21 0730     Special Requests: NO SPECIAL REQUESTS        Culture result: NO GROWTH 6 DAYS       CULTURE, BLOOD [[id number]] Collected: 03/23/21 1510    Order Status: Completed Specimen: Blood Updated: 03/29/21 0730     Special Requests: NO SPECIAL REQUESTS        Culture result: NO GROWTH 6 DAYS       C. DIFFICILE AG & TOXIN A/B [[id number]]     Order Status: Canceled Specimen: Stool     CULTURE, URINE [[id number]] Collected: 03/25/21 0730    Order Status: Completed Specimen: Urine from Clean catch Updated: 03/26/21 1042     Special Requests: NO SPECIAL REQUESTS        Culture result: No growth (<1,000 CFU/ML)       CULTURE, URINE [[id number]] Collected: 03/24/21 1100    Order Status: Canceled Specimen: Urine from Clean catch     COVID-19 RAPID TEST [[id number]] Collected: 03/24/21 0126    Order Status: Completed Specimen: Nasopharyngeal Updated: 03/24/21 0306     Specimen source Nasopharyngeal        COVID-19 rapid test Not detected        Comment: Rapid Abbott ID Now       Rapid NAAT:  The specimen is NEGATIVE for SARS-CoV-2, the novel coronavirus associated with COVID-19. Negative results should be treated as presumptive and, if inconsistent with clinical signs and symptoms or necessary for patient management, should be tested with an alternative molecular assay. Negative results do not preclude SARS-CoV-2 infection and should not be used as the sole basis for patient management decisions. This test has been authorized by the FDA under an Emergency Use Authorization (EUA) for use by authorized laboratories. Fact sheet for Healthcare Providers: [employer]  Fact sheet for Patients: [employer]       Methodology: Isothermal Nucleic Acid Amplification                 Images:    CT (Most Recent). CT Results (most recent):  Results from Hospital Encounter encounter on 03/23/21   CT HEAD WO CONT    Narrative CT Of The Head Without Contrast    CPT CODE: 75152    REASON FOR EXAM: \"Stroke\"    COMPARISON: 3/23/2021 head CT    TECHNIQUE:  Axial CT images of the head from the skull base to the vertex  without IV contrast. CT dose reduction was achieved through use of a  standardized protocol tailored for this examination and automatic exposure  control for dose modulation. FINDINGS:   Moderate amount of streak artifacts through the lower brain without repeated  images. Results in a loss of details particularly through the brainstem and  cerebellum. No abnormal extra-axial fluid collections or findings of acute intracranial  hemorrhage. Mild global cerebral and cerebellar volume loss not remarkable  relative the age. No hydrocephalus. Periventricular white matter lucencies  consistent with mild chronic microvascular ischemic changes are stable from  previous CT head. No findings of an acute or chronic infarct. No mass or midline  shift. No significant paranasal sinus disease.  Opacified right posterior ethmoid  cell is unchanged, sinuses are otherwise clear. Impression 1. No acute intracranial abnormality or change from the prior exam.  2. Mild chronic microvascular ischemic changes in the white matter. XRAY (Most Recent)      EKG Results for orders placed or performed in visit on 09/13/19   AMB POC EKG ROUTINE W/ 12 LEADS, INTER & REP     Status: None    Impression    See progress note. 2D ECHO 02/08/21   ECHO ADULT COMPLETE 02/09/2021 2/9/2021    Narrative · LV: Estimated LVEF is 55 - 60%. Normal cavity size and systolic function   (ejection fraction normal). Mild concentric hypertrophy. Wall motion:   normal. Age-appropriate left ventricular diastolic function. · LA: Left Atrium volume index is 30.57 mL/m2. · RV: Normal right ventricular size and function. · TV: Mild tricuspid valve regurgitation is present. · PA: Pulmonary arterial systolic pressure is 34 mmHg. · MV: Mild mitral annular calcification.         Signed by: Tremaine Alicia MD

## 2021-03-30 NOTE — PROGRESS NOTES
Nutrition Note    Continues with poor po intake but states it is due to his dislike of meals and inability to have salt packets. Discussed importance of low-sodium diet with pt and pt reports he is used to adding salt to his meals. Cardiac diet with honey thickened liquids and 1500 mL fluid restriction. Dislikes thickened liquids and asking for thin water. Noted pt with depression- Remeron dose increased today. Does like the magic cup and reports good consumption. Pt not progressing toward nutritional goals. Nutrition Recommendations/Plan:   - Continue current diet per SLP with Magic Cup, TID   - Monitor and encourage po intake as tolerated.     Electronically signed by Oumou Escobar RD on 3/30/2021 at 12:04 PM    Contact: [phone]

## 2021-03-30 NOTE — ROUTINE PROCESS
Patient found sitting on side of bed, IV out, patient refusing IV restart. Dr Leonora Lau made aware, no orders given.

## 2021-03-30 NOTE — ROUTINE PROCESS
Bedside report given to Eloina BAR, reviewed SBAR, MAR, VS and summary care. Patient quietly resting, call light in reach and bed alarm on.

## 2021-03-30 NOTE — ACP (ADVANCE CARE PLANNING)
[facility] [address] POST (Physician Orders for Scope of Treatment)       Date of conversation   3/30/2021   Location: [facility]       Participants:   [x] Patient    [x] Healthcare agent (already designated in existing ACP document)    Name: Destinee Lopez     Relationship to Patient:spouse    Phone Rohini Chene  [x] Other Surrogate Decision Maker / Next of Kin on phone     Name: Zulema Savage     Relationship to Patient:Son     Phone Number: [phone]     Other persons present:   Name: Rutland Elders    Relationship to patient: daughter in law    Name: Mary Zavala   Relationship to patient:son    [employer] ACP Facilitator: Betsey Acharya RN, Lissy Huang NP       Conversation Topics     Palliative team members Lissy Huang NP and this writer met pt at bedside for assessment. Pt was alert x 3 but could not recall why he came to hospital \"I don't know and they don't know\". No c/o pain, shortness of breathe noted. Call placed to pt's son, Zulema Savage (point person for family). Provided brief medical update and explored pt's prior level of function before hospital admit. Rudy Lucas stated that his father had been falling at home. Only able to ambulate 10 to 15 ft with breaks using FWW, in and out of car with great difficultly. Pt lives with his spouse who is recovering from a fall and pelvic fx. Mrs. Monique Hatchet is able to care for herself but would be unable to provide care for her spouse. Son expressed that the family would like to give patient the best change for rehab then see how he does. Plan for family meeting at bedside today 3PM to discuss goals of care - POST and AMD completion. Met with family members in room. Pt was alert and able to engage in conversation. Palliative team discussed goals of care including CPR, intubation and artifical nutrition. Mr. Monique Hatchet shared with the palliative team and his family that he would not want CPR or intubation. Also questioned patient if a NG or PEG tube would be acceptable and he clearly shook his head and stated \"No\". Physician Order for Scope of Treatment (POST) completed for DNR/DNI, Limited Interventions and NO feeding tubes signed by spouse, since Mr. Ryanne Machado became very fatigued and Johnson Varma NP. Palliative team explored plan for short term rehab to give pt the best chance at making gains. Also provided information about hospice care at the facility or home after rehab is complete. Lesson [place] from Experiences Related to Serious Illness:  Spouse shared that pt's father  at 80 from heart disease. This is something she shared that the patient focuses on. Identifies the following as important for living well: pt really \"wants to drink water\". Hopes: that he regains enough strength to return home. Worries/Fears about Medical Condition: that he will decline quickly and fight for regular water. Sources of support/comfort described as: Family members are very supportive on each other.      Needs to discuss with spiritual/Uatsdin advisor: [] Yes  [x] No    Needs more information about illness and complications:  [] Yes  [x] No      Cardiopulmonary Resuscitation        Order Elected for CPR:  []  Attempt Resuscitation [x]  Do Not Attempt Resuscitation      When NOT in Cardiopulmonary Arrest, Order Elected:      [] Comfort Measures  [x] Limited Additional Interventions  [] Full Interventions    Artificially Administered Nutrition, Order Elected:    [x] No Feeding Tube   [] Feeding Tube for a defined trial period  [] Feeding Tube long-term if indicated    The following was provided (check all that apply):      Healthcare Decision Information Cards:   [] Help with Breathing Facts   [x] Tube Feeding Facts   [] CPR Facts      [x] Review of existing Advance Directive  [x] Assistance with Completion of New Advance Directive   [x] Review of [place] POST Form       Meeting Outcomes:   [x] ACP discussion completed   [x] Cassie form completed  [x] Cassie prepared for Provider review and signature   [x] Original placed on Chart, if in facility (form to be sent with patient at discharge)  [x] Copy given to healthcare agent    [x] Copy scanned to electronic medical record    If ACP discussion not completed, last interview topic discussed:       Follow-up plan:     [x] Schedule follow-up conversation to continue planning   [] Referred individual to Provider for additional questions/concerns   [x] Advised patient/agent/surrogate to review completed POST form and update if needed with changes in condition, patient preferences or care setting     [] This note routed to one or more involved healthcare providers    Nan Rosario BSN, RN, [facility]  Palliative Medicine Inpatient RN  Palliative COPE Line: [phone]

## 2021-03-30 NOTE — PROGRESS NOTES
[facility]      CARDIOLOGY PROGRESS NOTE  RECS:      1. Atrial flutter, paroxysmal: Status post cardioversion 3/23/2021.back on atrial flutter rate controlled. Propafenone discontinued 3/29/21Continue low dose bb. 3.2 second pause 3/27/21 @13:36. No further pauses noted   2. 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 Watch on telemetry for any advanced AV blocks-stable   3. Congestive heart failure, chronic diastolic- compensated now. Lasix iv changed to po prn- recent limited echo shows EF 55%  4. Orthostatic hypotension with history of falls for 1 week- no orthostatic BP done today. discussed with nursing Continue to Check orthostatic  3 times a day. Continue compression stockings. Reduced midodrine. 5. Fever: Possible sepsis. Resolved   6. Dysphagia- being follow by ST on honey thick liquids and aspiration precautions   7. UTI/urinary retention- requiring indwelling catheter- tx per medical team   8. Severe deconditioning- refusing to work with PT  9. Hypokalemia- replaced by medical team     Hemodynamically stable. Would not consider antiarrhythmic at present and just control the heart rate and continue anticoagulation. Patient told me that he would like to get out of the bed but as I discussed with the nurses and hospitalist, Dr. Richard Ferreira patient is refusing to do anything on his own and when given liquids, he tends to aspirate. They are discussing with family and considering goals of care. From a cardiac standpoint CHF has resolved and Lasix can be used as needed. Continue midodrine for orthostasis. Discussed with his son on phone. Also brought up the living will issues and possible DNR. He seems to agree with me in principal.  I would let Dr. Richard Ferreira discussed that further.     EKG Results     Procedure 720 Value Units Date/Time    EKG, 12 LEAD, SUBSEQUENT [[id number]] Collected: 03/28/21 1430    Order Status: Completed Updated: 03/29/21 1131 Ventricular Rate 63 BPM      Atrial Rate 252 BPM      QRS Duration 152 ms      Q-T Interval 422 ms      QTC Calculation (Bezet) 431 ms      Calculated P Axis 116 degrees      Calculated R Axis -49 degrees      Calculated T Axis -5 degrees      Diagnosis --     Atrial flutter with 4:1 AV conduction  Right bundle branch block  Left anterior fascicular block  Bifascicular block  Abnormal ECG  When compared with ECG of 25-MAR-2021 10:34,  Atrial flutter has replaced Sinus rhythm  Confirmed by Mary Dean MD, Newton Desir ([id number]) on 3/29/2021 11:31:04 AM      EKG, 12 LEAD, SUBSEQUENT [[id number]] Collected: 03/25/21 1034    Order Status: Completed Updated: 03/25/21 1732     Ventricular Rate 69 BPM      Atrial Rate 69 BPM      P-R Interval 290 ms      QRS Duration 158 ms      Q-T Interval 438 ms      QTC Calculation (Bezet) 469 ms      Calculated P Axis 54 degrees      Calculated R Axis -62 degrees      Calculated T Axis 6 degrees      Diagnosis --     Sinus rhythm with 1st degree AV block  Right bundle branch block  Left anterior fascicular block  Bifascicular block  Abnormal ECG  When compared with ECG of 24-MAR-2021 09:46,  No significant change was found  Confirmed by Cole Lopez MD, Karey Jalloh ([id number]) on 3/25/2021 5:32:10 PM      EKG, 12 LEAD, INITIAL [[id number]] Collected: 03/24/21 0946    Order Status: Completed Updated: 03/24/21 1328     Ventricular Rate 108 BPM      Atrial Rate 117 BPM      QRS Duration 136 ms      Q-T Interval 366 ms      QTC Calculation (Bezet) 490 ms      Calculated R Axis -59 degrees      Calculated T Axis 17 degrees      Diagnosis --     Suspect sinus tach with RBBB  Left axis deviation  Abnormal ECG  When compared with ECG of 23-MAR-2021 16:13,  No significant change was found  Confirmed by Francheska Holloway M.D., Ramsey Little ([id number]) on 3/24/2021 1:28:24 PM      EKG, 12 LEAD, INITIAL [[id number]] Collected: 03/23/21 1613    Order Status: Completed Updated: 03/24/21 1244     Ventricular Rate 109 BPM      Atrial Rate 109 BPM P-R Interval 256 ms      QRS Duration 142 ms      Q-T Interval 366 ms      QTC Calculation (Bezet) 492 ms      Calculated R Axis -59 degrees      Calculated T Axis 41 degrees      Diagnosis --     Sinus tachycardia with 1st degree AV block  Right bundle branch block  Left anterior fascicular block  Bifascicular block  Septal infarct (cited on or before 12-JUL-2010)  Abnormal ECG  When compared with ECG of 23-MAR-2021 10:04,  Questionable change in initial forces of Septal leads  Confirmed by Yelena Morgan M.D., [employer] ([id number]) on 3/24/2021 12:43:58 PM          XR Results (most recent):      02/08/21   ECHO ADULT COMPLETE 02/09/2021 2/9/2021    Narrative · LV: Estimated LVEF is 55 - 60%. Normal cavity size and systolic function   (ejection fraction normal). Mild concentric hypertrophy. Wall motion:   normal. Age-appropriate left ventricular diastolic function. · LA: Left Atrium volume index is 30.57 mL/m2. · RV: Normal right ventricular size and function. · TV: Mild tricuspid valve regurgitation is present. · PA: Pulmonary arterial systolic pressure is 34 mmHg. · MV: Mild mitral annular calcification. Signed by: Kevin Maurice MD       02/08/21   NUCLEAR CARDIAC STRESS TEST 02/08/2021 2/8/2021    Narrative · Baseline ECG: Atrial fibrillation, atrial flutter, Nonspecific T wave   changes. · Negative stress test.  · Gated SPECT: Left ventricular function post-stress was normal.   Calculated ejection fraction is 76%. There is no evidence of transient   ischemic dilation (TID). The TID ratio is 1.5.   · Myocardial perfusion imaging supports a low risk stress test.  · Left ventricular perfusion is normal.        Signed by: Kevin Maurice MD            ASSESSMENT:  Hospital Problems  Date Reviewed: 3/23/2021          Codes Class Noted POA    Atrial flutter ([facility] [address] ICD-10-CM: J19.99  ICD-9-CM: 427.32  3/23/2021 Yes        * (Principal) Orthostatic hypotension ICD-10-CM: I95.1  ICD-9-CM: 458.0  3/23/2021 Unknown Fever ICD-10-CM: R50.9  ICD-9-CM: 780.60  3/23/2021 Unknown        RBBB (right bundle branch block with left anterior fascicular block) ICD-10-CM: I45.2  ICD-9-CM: 426.52  9/13/2019 Yes        Acute on chronic diastolic congestive heart failure ([facility] [address] ICD-10-CM: I50.33  ICD-9-CM: 428.33, 428.0  7/19/2017 No    Overview Addendum 8/31/2017 12:09 PM by Joi Tovar MD     8/17 no edema today; 7/17 adv to stop all herbal OTC products for now                     SUBJECTIVE:  No chest pain  SOB is the same  Not feeling good today     OBJECTIVE:    VS:   Visit Vitals  /71 (BP 1 Location: Left upper arm, BP Patient Position: At rest)   Pulse 67   Temp 98.4 °F (36.9 °C)   Resp 18   Ht 6' (1.829 m)   Wt 96.8 kg (213 lb 6.5 oz)   SpO2 95%   BMI 28.94 kg/m²         Intake/Output Summary (Last 24 hours) at 3/30/2021 0930  Last data filed at 3/30/2021 0552  Gross per 24 hour   Intake 200 ml   Output 1300 ml   Net -1100 ml     TELE: atrial flutter rate controlled     General: alert and in no distress. weak   HENT: Normocephalic, atraumatic. Normal external eye. Eyelid edematous  Neck :  no bruit, no JVD  Cardiac:  regular rate and rhythm, tachycardia  Chest/Lungs:chest clear, no wheezing, rales. Diminished breath sounds   Abdomen: Soft, nontender, no masses  Extremities: trace BLE edema  peripheral pulses present      Labs: Results:       Chemistry Recent Labs     03/30/21  0134 03/29/21  0409 03/28/21  0148   * 103* 48*   * 130* 134*   K 3.0* 3.0* 3.4*   CL 97* 98* 101   CO2 24 26 21   BUN 20* 19* 22*   CREA 0.98 0.97 0.78   CA 8.1* 8.2* 8.3*   MG  --   --  2.0   PHOS  --   --  2.6   AGAP 11 6 12   BUCR 20 20 28*      CBC w/Diff Recent Labs     03/28/21  0148   WBC 8.0   RBC 3.18*   HGB 9.8*   HCT 28.7*         Cardiac Enzymes No results for input(s): CPK, CKND1, ARMEN in the last 72 hours.     No lab exists for component: CKRMB, TROIP   Coagulation No results for input(s): PTP, INR, APTT, INREXT, INREXT in the last 72 hours. Lipid Panel No results found for: CHOL, CHOLPOCT, CHOLX, CHLST, CHOLV, 706022, HDL, HDLP, LDL, LDLC, DLDLP, 451438, VLDLC, VLDL, TGLX, TRIGL, TRIGP, TGLPOCT, CHHD, CHHDX   BNP No results for input(s): BNPP in the last 72 hours. Liver Enzymes No results for input(s): TP, ALB, TBIL, AP in the last 72 hours. No lab exists for component: SGOT, GPT, DBIL   Digoxin    Thyroid Studies Lab Results   Component Value Date/Time    TSH 1.51 03/27/2021 02:37 AM              DOUGIE Jo      I have personally and independently evaluated and examined the patient. All relevant labs and testing data are reviewed. I have personally reviewed all the diagnostic labs, available EKG imaging x-rays and other diagnostic data and incorporated them into my care. I am referencing APC's note. I participated in medical decision making. Care plan discussed and updated after review.   Christine Burton MD

## 2021-03-30 NOTE — CONSULTS
[id number] [facility] 54: [phone]  [facility]: [address] [place] [address] Way: [phone]    Patient Name: Hero Conner  YOB: 1935    Date of Initial Consult: 3/30/2021   Reason for Consult: goals of care   Requesting Provider: Dr Braxton Wells  Primary Care Physician: Иван Fenton MD      SUMMARY:   Hero Conner is a 81y.o. year old with a past history of hypertension, atrial flutter, congestive heart failure, who was admitted on 3/23/2021 from home. He was a planned cardioversion for atrial flutter who was successfully without complication. He had symptom orthostatic hypotension after the procedure. Current medical issues leading to Palliative Medicine involvement include: 81year old who family reports has had a functional decline the past several weeks. Now with weakness, dysphagia, hypotension. Palliative medicine is consulted for goals of care and care decisions. PALLIATIVE DIAGNOSES:   1. Goals of care   2. Acute on chronic diastolic CHF  3. Hypotension   4. Debility        PLAN:   1. Goals of care Mr Henrry Sellers seen at bedside he is weak appearing, alert x 3 but do not believe he can participate in complex medical decisions. He shared with he was not sure why he is in the hospital. We spoke with his son Tobias Osler over phone, he has been well updated by attending team. Patient lives with his wife, has been declining in function past several weeks. Ambulates with walker but was having difficulty with that. Goals of care introduced per Tobias Osler he believed his father had a DNR previous to this stay. Tobias Osler is currently in [place] but his mom and wife are local. He asked we call his wife to his if they can come in to further discuss. Called his daughter in law Gordon Yumiko, wife and daughter in law plan to come in today to further discuss goals of care and care decisions.  Addendum spoke with wife, son Carlos Barajas, son Tobias Osler via phone, daughter in [place], patient a bit more clear this afternoon at bedside. Long discussion concerning goals of care; patient clear no CPR or intubation, family agrees. Discussed feeding tubes with both benefits and burdens, again patient does not wish to be subjected to this. We re addressed with family outside of room, all agreed no CPR no intubation and no feeding tubes. Family wishes to try therapy at SNF. To give him the best opportunity to improve. I do worry he will thrive and shared this with the family who also expressed this worry. Discussed care decisions a bit further with family, if he does not do as well as all hope with therapy at SNF ( increased function, endurance, strength) option of moving to a more comfort measured approach discussed with family. This would include support of hospice at home vs facility ( private pay for room and board ). POST form completion introduced and completed for DNR/DNI limited interventions, no feeding tubes. Signed by wife as patient became very fatigued. Attending and BSRN aware of goals of care change. 2. Acute on chronic congestive heart failure cards on board, has diuresed well Echo with preserved EF  3. Hypotension on midodrine. Still hypotension with standing   4. Debility per son uses walker for ambulation. PT on board, needs max cues. Frail appearing. They have recommended SNF. His current PPS is low at 40 indicating rather extensive diease and debility. I have discussed with son, it is unknown even with therapy how well and how much function will return. They wish to give him a chance to get stronger. 5. Initial consult note routed to primary continuity provider  6.  Communicated plan of care with: Palliative IDT    GOALS OF CARE:         TREATMENT PREFERENCES:   Code Status: Full Code    Advance Care Planning:  [] The [employer] Interdisciplinary Team has updated the ACP Navigator with [address] and Patient Capacity    Primary Decision [facility] ([address]):   Primary Decision Maker: Amelia Perez - Spouse - [phone]                Other:  As far as possible, the palliative care team has discussed with patient / health care proxy about goals of care / treatment preferences for patient. HISTORY:     History obtained from: chart and family     CHIEF COMPLAINT: hypotension increased weakness     HPI/SUBJECTIVE:    The patient is:   [x] Verbal and participatory limited poor historian   [] Non-participatory due to:   Please see summary     Clinical Pain Assessment (nonverbal scale for nonverbal patients):            Duration: for how long has pt been experiencing pain (e.g., 2 days, 1 month, years)  Frequency: how often pain is an issue (e.g., several times per day, once every few days, constant)     FUNCTIONAL ASSESSMENT:     Palliative Performance Scale (PPS):       ECOG        PSYCHOSOCIAL/SPIRITUAL SCREENING:      Any spiritual / Yazdanism concerns: unable to assess for patient   [] Yes /  [] No    Caregiver Burnout:  [] Yes /  [] No /  [] No Caregiver Present      Anticipatory grief assessment: unable to assess for patient   [] Normal  / [] Maladaptive        REVIEW OF SYSTEMS:     Positive and pertinent negative findings in ROS are noted above in HPI. The following systems were [x] reviewed / [] unable to be reviewed as noted in HPI  Other findings are noted below. Systems: constitutional, ears/nose/mouth/throat, respiratory, gastrointestinal, genitourinary, musculoskeletal, integumentary, neurologic, psychiatric, endocrine. Positive findings noted below. Modified ESAS Completed by: provider                                   Stool Occurrence(s): 1        PHYSICAL EXAM:     Wt Readings from Last 3 Encounters:   03/30/21 96.8 kg (213 lb 6.5 oz)   03/23/21 93.9 kg (207 lb)   03/08/21 94.1 kg (207 lb 6.4 oz)     Blood pressure 130/73, pulse 66, temperature 98 °F (36.7 °C), resp. rate 20, height 6' (1.829 m), weight 96.8 kg (213 lb 6.5 oz), SpO2 98 %.   Pain:  Pain Scale 1: Numeric (0 - 10)  Pain Intensity 1: 0                 Last bowel movement: x 1 3/29/2021     Constitutional: frail / weak appearing gentleman in bed in NAD   Eyes: pupils equal   ENMT: moist MMM  Cardiovascular: RRR  Respiratory: respirations not labored   Gastrointestinal: soft   Skin: warm and dry   Neurologic: alert oriented x 2 to 3. Not able to make a complex medical decision        HISTORY:     Principal Problem:    Orthostatic hypotension (3/23/2021)    Active Problems:    Acute on chronic diastolic congestive heart failure ([address] (2017)      Overview:  no edema today;  adv to stop all herbal OTC products for now      RBBB (right bundle branch block with left anterior fascicular block) (2019)      Atrial flutter ([address] (3/23/2021)      Fever (3/23/2021)      Dysphagia (3/30/2021)      Physical deconditioning (3/30/2021)      Past Medical History:   Diagnosis Date    Unspecified essential hypertension 2017      Past Surgical History:   Procedure Laterality Date    HX BACK SURGERY      UT CARDIOVERSION ELECTIVE ARRHYTHMIA EXTERNAL N/A 3/23/2021    EP CARDIOVERSION performed by Julio Elmore MD at [address] [place] [address]      Family History   Problem Relation Age of Onset    Cancer Sister     Heart Attack Neg Hx     Stroke Neg Hx      History reviewed, no pertinent family history.   Social History     Tobacco Use    Smoking status: Former Smoker     Quit date: 1982     Years since quittin.7    Smokeless tobacco: Never Used   Substance Use Topics    Alcohol use: No     Allergies   Allergen Reactions    Seasonale [Levonorgestrel-Ethinyl Estrad] Sneezing     Seasonal allergy not with SEASONALE [LEVONORGESTREL-ETHINYL ESTRAD]      Current Facility-Administered Medications   Medication Dose Route Frequency    magnesium oxide (MAG-OX) tablet 400 mg  400 mg Oral DAILY    mirtazapine (REMERON) tablet 30 mg  30 mg Oral QHS    potassium chloride (K-DUR, KLOR-CON) SR tablet 40 mEq  40 mEq Oral BID    furosemide (LASIX) tablet 20 mg  20 mg Oral DAILY PRN    metoprolol tartrate (LOPRESSOR) tablet 12.5 mg  12.5 mg Oral BID    midodrine (PROAMATINE) tablet 5 mg  5 mg Oral TID WITH MEALS    tamsulosin (FLOMAX) capsule 0.4 mg  0.4 mg Oral QHS    Lactobacillus Acidoph & Bulgar (FLORANEX) tablet 2 Tab  2 Tab Oral BID    ferrous sulfate tablet 325 mg  1 Tab Oral BID WITH MEALS    cholecalciferol (VITAMIN D3) capsule 5,000 Units  5,000 Units Oral DAILY    melatonin (rapid dissolve) tablet 5 mg  5 mg Oral QHS    famotidine (PEPCID) tablet 20 mg  20 mg Oral BID    apixaban (ELIQUIS) tablet 5 mg  5 mg Oral BID    levothyroxine (SYNTHROID) tablet 25 mcg  25 mcg Oral ACB    timolol (TIMOPTIC) 0.25% ophthalmic solution  1 Drop Both Eyes BID    latanoprost (XALATAN) 0.005 % ophthalmic solution 1 Drop  1 Drop Both Eyes QHS    sodium chloride (NS) flush 5-40 mL  5-40 mL IntraVENous Q8H    sodium chloride (NS) flush 5-40 mL  5-40 mL IntraVENous PRN    acetaminophen (TYLENOL) tablet 650 mg  650 mg Oral Q6H PRN    Or    acetaminophen (TYLENOL) suppository 650 mg  650 mg Rectal Q6H PRN    polyethylene glycol (MIRALAX) packet 17 g  17 g Oral DAILY PRN    ondansetron (ZOFRAN) injection 4 mg  4 mg IntraVENous Q8H PRN        LAB AND IMAGING FINDINGS:     Lab Results   Component Value Date/Time    WBC 8.0 03/28/2021 01:48 AM    HGB 9.8 (L) 03/28/2021 01:48 AM    PLATELET 116 65/66/7073 01:48 AM     Lab Results   Component Value Date/Time    Sodium 132 (L) 03/30/2021 01:34 AM    Potassium 3.0 (L) 03/30/2021 01:34 AM    Chloride 97 (L) 03/30/2021 01:34 AM    CO2 24 03/30/2021 01:34 AM    BUN 20 (H) 03/30/2021 01:34 AM    Creatinine 0.98 03/30/2021 01:34 AM    Calcium 8.1 (L) 03/30/2021 01:34 AM    Magnesium 1.7 03/30/2021 01:34 AM    Phosphorus 2.6 03/28/2021 01:48 AM      Lab Results   Component Value Date/Time    Alk.  phosphatase 98 03/23/2021 03:20 PM    Protein, total 6.4 03/23/2021 03:20 PM    Albumin 3.1 (L) 03/23/2021 03:20 PM    Globulin 3.3 03/23/2021 03:20 PM     Lab Results   Component Value Date/Time    INR 1.6 (H) 03/23/2021 03:20 PM    Prothrombin time 18.3 (H) 03/23/2021 03:20 PM    aPTT 25.9 07/22/2020 01:42 AM      Lab Results   Component Value Date/Time    Iron 28 (L) 03/27/2021 02:37 AM    TIBC 204 (L) 03/27/2021 02:37 AM    Iron % saturation 14 (L) 03/27/2021 02:37 AM    Ferritin 761 (H) 03/27/2021 02:37 AM      No results found for: PH, PCO2, PO2  No components found for: [place]   Lab Results   Component Value Date/Time     03/23/2021 03:20 PM    CK - MB 1.6 03/23/2021 03:20 PM              Total time: 70 minutes   Counseling / coordination time, spent as noted above:   > 50% counseling / coordination: yes with patient, wife, son Ata Araya, son Aissatou Lujan via phone and daughter in law Merced Ji    Prolonged service was provided for  []30 min   []75 min in face to face time in the presence of the patient, spent as noted above.   Time Start:   Time End:

## 2021-03-31 VITALS
BODY MASS INDEX: 28.27 KG/M2 | RESPIRATION RATE: 17 BRPM | TEMPERATURE: 97 F | OXYGEN SATURATION: 98 % | HEART RATE: 69 BPM | WEIGHT: 208.7 LBS | SYSTOLIC BLOOD PRESSURE: 136 MMHG | DIASTOLIC BLOOD PRESSURE: 83 MMHG | HEIGHT: 72 IN

## 2021-03-31 LAB
ANION GAP SERPL CALC-SCNC: 7 MMOL/L (ref 3–18)
BUN SERPL-MCNC: 19 MG/DL (ref 7–18)
BUN/CREAT SERPL: 24 (ref 12–20)
CALCIUM SERPL-MCNC: 8 MG/DL (ref 8.5–10.1)
CHLORIDE SERPL-SCNC: 98 MMOL/L (ref 100–111)
CO2 SERPL-SCNC: 27 MMOL/L (ref 21–32)
CREAT SERPL-MCNC: 0.79 MG/DL (ref 0.6–1.3)
GLUCOSE SERPL-MCNC: 101 MG/DL (ref 74–99)
POTASSIUM SERPL-SCNC: 4.1 MMOL/L (ref 3.5–5.5)
SODIUM SERPL-SCNC: 132 MMOL/L (ref 136–145)

## 2021-03-31 PROCEDURE — 74011250637 HC RX REV CODE- 250/637: Performed by: INTERNAL MEDICINE

## 2021-03-31 PROCEDURE — 36415 COLL VENOUS BLD VENIPUNCTURE: CPT

## 2021-03-31 PROCEDURE — 80048 BASIC METABOLIC PNL TOTAL CA: CPT

## 2021-03-31 PROCEDURE — 99239 HOSP IP/OBS DSCHRG MGMT >30: CPT | Performed by: INTERNAL MEDICINE

## 2021-03-31 PROCEDURE — 74011250637 HC RX REV CODE- 250/637: Performed by: NURSE PRACTITIONER

## 2021-03-31 PROCEDURE — 74011250637 HC RX REV CODE- 250/637: Performed by: HOSPITALIST

## 2021-03-31 PROCEDURE — 99232 SBSQ HOSP IP/OBS MODERATE 35: CPT | Performed by: INTERNAL MEDICINE

## 2021-03-31 RX ORDER — MIRTAZAPINE 30 MG/1
30 TABLET, FILM COATED ORAL
Qty: 30 TAB | Refills: 0 | Status: SHIPPED
Start: 2021-03-31

## 2021-03-31 RX ORDER — LANOLIN ALCOHOL/MO/W.PET/CERES
325 CREAM (GRAM) TOPICAL
Qty: 15 TAB | Refills: 0 | Status: SHIPPED
Start: 2021-03-31

## 2021-03-31 RX ORDER — CALCIUM CARB/MAGNESIUM CARB 311-232MG
5 TABLET ORAL
Qty: 30 TAB | Refills: 0 | Status: SHIPPED
Start: 2021-03-31

## 2021-03-31 RX ORDER — MIDODRINE HYDROCHLORIDE 10 MG/1
10 TABLET ORAL
Qty: 90 TAB | Refills: 0 | Status: SHIPPED
Start: 2021-03-31 | End: 2021-04-30

## 2021-03-31 RX ORDER — MIDODRINE HYDROCHLORIDE 5 MG/1
10 TABLET ORAL
Status: DISCONTINUED | OUTPATIENT
Start: 2021-03-31 | End: 2021-03-31 | Stop reason: HOSPADM

## 2021-03-31 RX ORDER — FUROSEMIDE 20 MG/1
TABLET ORAL
Qty: 15 TAB | Refills: 0 | Status: SHIPPED
Start: 2021-03-31

## 2021-03-31 RX ORDER — TAMSULOSIN HYDROCHLORIDE 0.4 MG/1
0.4 CAPSULE ORAL
Qty: 30 CAP | Refills: 0 | Status: SHIPPED
Start: 2021-03-31

## 2021-03-31 RX ORDER — METOPROLOL TARTRATE 25 MG/1
12.5 TABLET, FILM COATED ORAL 2 TIMES DAILY
Qty: 30 TAB | Refills: 0 | Status: SHIPPED
Start: 2021-03-31

## 2021-03-31 RX ADMIN — LEVOTHYROXINE SODIUM 25 MCG: 25 TABLET ORAL at 07:15

## 2021-03-31 RX ADMIN — Medication 10 ML: at 07:15

## 2021-03-31 RX ADMIN — TIMOLOL MALEATE 1 DROP: 2.5 SOLUTION/ DROPS OPHTHALMIC at 10:58

## 2021-03-31 RX ADMIN — MIDODRINE HYDROCHLORIDE 5 MG: 5 TABLET ORAL at 08:00

## 2021-03-31 RX ADMIN — MAGNESIUM GLUCONATE 500 MG ORAL TABLET 400 MG: 500 TABLET ORAL at 08:30

## 2021-03-31 RX ADMIN — FAMOTIDINE 20 MG: 20 TABLET ORAL at 08:30

## 2021-03-31 RX ADMIN — MIDODRINE HYDROCHLORIDE 10 MG: 5 TABLET ORAL at 12:00

## 2021-03-31 RX ADMIN — POTASSIUM CHLORIDE 40 MEQ: 1500 TABLET, EXTENDED RELEASE ORAL at 08:30

## 2021-03-31 RX ADMIN — APIXABAN 5 MG: 5 TABLET, FILM COATED ORAL at 09:38

## 2021-03-31 RX ADMIN — METOPROLOL TARTRATE 12.5 MG: 25 TABLET, FILM COATED ORAL at 08:30

## 2021-03-31 RX ADMIN — FERROUS SULFATE TAB 325 MG (65 MG ELEMENTAL FE) 325 MG: 325 (65 FE) TAB at 08:30

## 2021-03-31 RX ADMIN — Medication 2 TABLET: at 08:30

## 2021-03-31 RX ADMIN — Medication 5000 UNITS: at 08:30

## 2021-03-31 NOTE — PROGRESS NOTES
1305- Bedside and Verbal shift change report given to YOSVANY Barber (oncoming nurse) by Heidy Goff RN . Report included the following information SBAR, Kardex, Intake/Output, MAR and Recent Results. Assumed care of pt for the remainder of shift. Pt is in bed, family members present, no outward signs of distress or complaints of pain. Will continue to monitor. 1504- Reassessment. No changes    1803- PM PO meds given, Pt tolerated well. 1910- Bedside and Verbal shift change report given to Dalton Hannon RN (oncoming nurse) by YOSVANY Barber  (offgoing nurse). Report included the following information SBAR, Kardex, Intake/Output, MAR and Recent Results.

## 2021-03-31 NOTE — PROGRESS NOTES
Problem: Patient Education: Go to Patient Education Activity  Goal: Patient/Family Education  Outcome: Progressing Towards Goal     Problem: Falls - Risk of  Goal: *Absence of Falls  Description: Document Ashly Crow Fall Risk and appropriate interventions in the flowsheet. Outcome: Progressing Towards Goal  Note: Fall Risk Interventions:  Mobility Interventions: Bed/chair exit alarm, PT Consult for mobility concerns         Medication Interventions: Bed/chair exit alarm, Teach patient to arise slowly    Elimination Interventions: Bed/chair exit alarm, Call light in reach, Urinal in reach              Problem: Patient Education: Go to Patient Education Activity  Goal: Patient/Family Education  Outcome: Progressing Towards Goal     Problem: Pressure Injury - Risk of  Goal: *Prevention of pressure injury  Description: Document Horace Scale and appropriate interventions in the flowsheet.   Outcome: Progressing Towards Goal  Note: Pressure Injury Interventions:  Sensory Interventions: Assess changes in LOC    Moisture Interventions: Apply protective barrier, creams and emollients, Absorbent underpads, Internal/External urinary devices, Limit adult briefs, Minimize layers    Activity Interventions: Assess need for specialty bed, PT/OT evaluation, Pressure redistribution bed/mattress(bed type), Increase time out of bed    Mobility Interventions: HOB 30 degrees or less, PT/OT evaluation, Assess need for specialty bed    Nutrition Interventions: Document food/fluid/supplement intake, Discuss nutritional consult with provider, Offer support with meals,snacks and hydration    Friction and Shear Interventions: Apply protective barrier, creams and emollients, Feet elevated on foot rest, Lift sheet, HOB 30 degrees or less, Minimize layers, Sit at 90-degree angle                Problem: Patient Education: Go to Patient Education Activity  Goal: Patient/Family Education  Outcome: Progressing Towards Goal     Problem: Nutrition Deficit  Goal: *Optimize nutritional status  Outcome: Progressing Towards Goal     Problem: Risk for Spread of Infection  Goal: Prevent transmission of infectious organism to others  Description: Prevent the transmission of infectious organisms to other patients, staff members, and visitors.   Outcome: Progressing Towards Goal

## 2021-03-31 NOTE — PROGRESS NOTES
Hospitalist Progress Note    Patient: Pedro Damico Age: 81 y.o. : 1935 MR#: [id number] SSN: [id number]  Date/Time: 3/31/2021     DOA: 3/23/2021  PCP: Sera Reyes MD    Subjective:     Patient is feeling fine currently. He knows where he is, he knows his date of birth as well as [facility] president's name. Denies any headaches or dizziness. Denies any chest pain or palpitations. No shortness of breath or cough no nausea or vomiting. No leg pain. Follows commands appropriately. Interval Hospital Course:  81 y.o male with HTN, hypothyroidism, recent Atrial flutter with controlled rate, on Eliquis, has on-going increasing shortness of breath, who underwent cardioversion in the cath lab 3/23/21 and sustained hypotension. He remained with symptomatic orthostatic hypotension after the procedure with low grade fever but no leukocytosis. He was initially admitted with resuming his Eliquis, started on Vancomycin/zosyn for suspected sepsis. His UA was dirty. His COVID-19 was negative. Cardiology started him on Propafenone with his Eliquis. Midodrine was added. Albumin was given and his metoprolol was held. His proBNP >7000, Echo with EF 55%. His orthostatic symptom slowly resolved. Over the weekend, he was had worsened proBNP and chest congestion, lasix IV was given. He tolerated well. He has been laying in bed and not participating in his care. Family was concern for depression vs new stroke since he also was found to have dysphagia to thin liquid. CT head on admission was negative. Repeat CT head was negative. Speech has been advised on honey thickened liquid but he refused to drink this. In concern for depression, he was started on Mirtazapine which he tolerated well. He was noted to have acute urinary retention, he urine was negative for infection but he has finished 4 days of ceftriaxone. He continues to have recurrent symptom, hence saba was replaced.  He was started on Tamsulosin Assessment/Plan:     1. Oropharyngeal dysphagia thin liquid  2. Acute on chronic diastolic CHF, compensated currently  3. Paroxysmal Atrial flutter s/p cardioversion per Dr. Syl Martínez, rate controlled. 4.   Orthostatic hypotension, slowly proving  5. Hypothyroidism   6. Hyponatremia, stable  7. SIRS due to noninfectious etiology, resolved  8. Anemia of chronic medical disease, stable  9. Former smoker  8. Recurrent urinary retention status post Talavera  11. Bacteruria, no evidence of infection, completed ceftriaxone   12. Physical deconditioning with Fall at home   13. Depression  14. Hypokalemia, better today    PLAN:    Continue low-dose metoprolol, midodrine and Eliquis  Cardiology input noted about not adding antiarithmetic  Hypokalemia is better  Continue Synthroid for hypothyroidism  Water restrictions up to 1 L a day  Continue Remeron for #13  Palliative care input noted about making this patient DNR  Continue multivitamin and ferrous sulfate  Discharge patient to skilled nursing facility today    Spoke with family [phone] with clinical updates and plane of care including discharge plan.      Case discussed with:  [x]Patient  [x]Family  [x]Nursing  [x]Case Management  DVT Prophylaxis:  []Lovenox  [x]Eliquis   []SCDs  []Coumadin   []On Heparin gtt    Signed By: Abdullahi Castro MD     2021               Objective:   VS:   Visit Vitals  /83 (BP 1 Location: Left upper arm, BP Patient Position: At rest)   Pulse 69   Temp 97 °F (36.1 °C)   Resp 17   Ht 6' (1.829 m)   Wt 94.7 kg (208 lb 11.2 oz)   SpO2 98%   BMI 28.30 kg/m²      Tmax/24hrs: Temp (24hrs), Av.9 °F (36.6 °C), Min:97 °F (36.1 °C), Max:98.7 °F (37.1 °C)      Intake/Output Summary (Last 24 hours) at 3/31/2021 1111  Last data filed at 3/31/2021 9949  Gross per 24 hour   Intake 320 ml   Output 3000 ml   Net -2680 ml       General:   Awake, follows commands appropriately, not in acute distress  Cardiovascular: S1S2 regular, no murmurs  Pulmonary: Clear air entry bilaterally without any wheezes  GI:  Soft, non tender, non distended, +bs, no guarding   Extremities:  No pedal edema, +distal pulses appreciated   Neuro: AOx3, moving all extremities, no gross deficit.        Current Facility-Administered Medications   Medication Dose Route Frequency    midodrine (PROAMATINE) tablet 10 mg  10 mg Oral TID WITH MEALS    magnesium oxide (MAG-OX) tablet 400 mg  400 mg Oral DAILY    mirtazapine (REMERON) tablet 30 mg  30 mg Oral QHS    potassium chloride (K-DUR, KLOR-CON) SR tablet 40 mEq  40 mEq Oral BID    furosemide (LASIX) tablet 20 mg  20 mg Oral DAILY PRN    metoprolol tartrate (LOPRESSOR) tablet 12.5 mg  12.5 mg Oral BID    tamsulosin (FLOMAX) capsule 0.4 mg  0.4 mg Oral QHS    Lactobacillus Acidoph & Bulgar (FLORANEX) tablet 2 Tab  2 Tab Oral BID    ferrous sulfate tablet 325 mg  1 Tab Oral BID WITH MEALS    cholecalciferol (VITAMIN D3) capsule 5,000 Units  5,000 Units Oral DAILY    melatonin (rapid dissolve) tablet 5 mg  5 mg Oral QHS    famotidine (PEPCID) tablet 20 mg  20 mg Oral BID    apixaban (ELIQUIS) tablet 5 mg  5 mg Oral BID    levothyroxine (SYNTHROID) tablet 25 mcg  25 mcg Oral ACB    timolol (TIMOPTIC) 0.25% ophthalmic solution  1 Drop Both Eyes BID    latanoprost (XALATAN) 0.005 % ophthalmic solution 1 Drop  1 Drop Both Eyes QHS    sodium chloride (NS) flush 5-40 mL  5-40 mL IntraVENous Q8H    sodium chloride (NS) flush 5-40 mL  5-40 mL IntraVENous PRN    acetaminophen (TYLENOL) tablet 650 mg  650 mg Oral Q6H PRN    Or    acetaminophen (TYLENOL) suppository 650 mg  650 mg Rectal Q6H PRN    polyethylene glycol (MIRALAX) packet 17 g  17 g Oral DAILY PRN    ondansetron (ZOFRAN) injection 4 mg  4 mg IntraVENous Q8H PRN        Recent Results (from the past 24 hour(s))   METABOLIC PANEL, BASIC    Collection Time: 03/31/21 12:47 AM   Result Value Ref Range    Sodium 132 (L) 136 - 145 mmol/L    Potassium 4.1 3.5 - 5.5 mmol/L    Chloride 98 (L) 100 - 111 mmol/L    CO2 27 21 - 32 mmol/L    Anion gap 7 3.0 - 18 mmol/L    Glucose 101 (H) 74 - 99 mg/dL    BUN 19 (H) 7.0 - 18 MG/DL    Creatinine 0.79 0.6 - 1.3 MG/DL    BUN/Creatinine ratio 24 (H) 12 - 20      GFR est AA >60 >60 ml/min/1.73m2    GFR est non-AA >60 >60 ml/min/1.73m2    Calcium 8.0 (L) 8.5 - 10.1 MG/DL

## 2021-03-31 NOTE — PROGRESS NOTES
Transition of Care Plan to SNF/Rehab    SNF/Rehab Transition:  Patient has been accepted to [address] [place] [address] Dr and meets criteria for admission. Patient will transported by [facility] and expected to [facility] at [facility].    Communication to Patient/Family:  Met with patient and Elier Oseguera (identified care giver) and they are agreeable to the transition plan. Communication to SNF/Rehab:  Bedside RN, Arjun Araiza, has been notified to update the transition plan to the facility and call report (phone number [phone])  Discharge information has been updated on the AVS.             Nursing Please include all hard scripts for controlled substances, med rec and dc summary, and AVS in packet. Reviewed and confirmed with facility, Wendy Perrin, can manage the patient care needs for the following:     Claretha Samples with (X) only those applicable:    Medication:  [x]  Medications will be available at the facility  []  IV Antibiotics   []  Controlled Substance - hard copy to be sent with patient   []  Weekly Labs   Documents:  [x] Hard RX  [x] MAR  [x] Kardex  [x] AVS  [x]Transfer Summary  [x]Discharge   Equipment:  []  CPAP/BiPAP  []  Wound Vacuum  []  Talavera or Urinary Device  []  PICC/Central Line  []  Nebulizer  []  Ventilator   Treatment:  []Isolation (for MRSA, VRE, etc.)  []Surgical Drain Management  []Tracheostomy Care  []Dressing Changes  []Dialysis with transportation and chair time   []PEG Care  []Oxygen  []Daily Weights for Heart Failure   Dietary:  []Any diet limitations  []Tube Feedings   []Total Parenteral Management (TPN)   Eligible for Medicaid Long Term Services and Supports  Yes:  [] Eligible for medical assistance or will become eligible within 180 days and UAI completed. [x] Provider/Patient and/or support system has requested screening. [] UAI copy provided to patient or responsible party,  [] UAI unavailable at discharge will send once processed to SNF provider.   [] UAI unavailable at discharged mailed to patient  No:   [x] Private pay and is not financially eligible for Medicaid within the next 180 days. [] Reside out-of-state. [] A residents of a state owned/operated facility that is licensed  by [facility] or [facility]  [] Enrollment in [facility] services  [] [address] [place] [address]  [] Patient /Family declines to have screening completed or provide financial information for screening     Financial Resources:  Medicaid    [] Initiated and application pending   [] Full coverage     Advanced Care Plan:  [x]Surrogate Decision Maker of Care  [x]POA  [x]Communicated Code Status DDNR(DDNR\", \"Full\")    Other  Medicare pt has received, reviewed, and signed 2nd IM letter informing them of their right to appeal the discharge. Signed copy has been placed on pt bedside chart.

## 2021-03-31 NOTE — DISCHARGE INSTRUCTIONS
Patient Education        Atrial Fibrillation: Care Instructions  Your Care Instructions     Atrial fibrillation is an irregular and often fast heartbeat. Treating this condition is important for several reasons. It can cause blood clots, which can travel from your heart to your brain and cause a stroke. If you have a fast heartbeat, you may feel lightheaded, dizzy, and weak. An irregular heartbeat can also increase your risk for heart failure. Atrial fibrillation is often the result of another heart condition, such as high blood pressure or coronary artery disease. Making changes to improve your heart condition will help you stay healthy and active. Follow-up care is a key part of your treatment and safety. Be sure to make and go to all appointments, and call your doctor if you are having problems. It's also a good idea to know your test results and keep a list of the medicines you take. How can you care for yourself at home? Medicines    · Take your medicines exactly as prescribed. Call your doctor if you think you are having a problem with your medicine. You will get more details on the specific medicines your doctor prescribes.     · If your doctor has given you a blood thinner to prevent a stroke, be sure you get instructions about how to take your medicine safely. Blood thinners can cause serious bleeding problems.     · Do not take any vitamins, over-the-counter drugs, or herbal products without talking to your doctor first.   Lifestyle changes    · Do not smoke. Smoking can increase your chance of a stroke and heart attack. If you need help quitting, talk to your doctor about stop-smoking programs and medicines. These can increase your chances of quitting for good.     · Eat a heart-healthy diet.     · Stay at a healthy weight. Lose weight if you need to.     · Limit alcohol to 2 drinks a day for men and 1 drink a day for women. Too much alcohol can cause health problems.     · Avoid colds and flu.  Get a pneumococcal vaccine shot. If you have had one before, ask your doctor whether you need another dose. Get a flu shot every year. If you must be around people with colds or flu, wash your hands often. Activity    · If your doctor recommends it, get more exercise. Walking is a good choice. Bit by bit, increase the amount you walk every day. Try for at least 30 minutes on most days of the week. You also may want to swim, bike, or do other activities. Your doctor may suggest that you join a cardiac rehabilitation program so that you can have help increasing your physical activity safely.     · Start light exercise if your doctor says it is okay. Even a small amount will help you get stronger, have more energy, and manage stress. Walking is an easy way to get exercise. Start out by walking a little more than you did in the hospital. Gradually increase the amount you walk.     · When you exercise, watch for signs that your heart is working too hard. You are pushing too hard if you cannot talk while you are exercising. If you become short of breath or dizzy or have chest pain, sit down and rest immediately.     · Check your pulse regularly. Place two fingers on the artery at the palm side of your wrist, in line with your thumb. If your heartbeat seems uneven or fast, talk to your doctor. When should you call for help? Call 911 anytime you think you may need emergency care. For example, call if:    · You have symptoms of a heart attack. These may include:  ? Chest pain or pressure, or a strange feeling in the chest.  ? Sweating. ? Shortness of breath. ? Nausea or vomiting. ? Pain, pressure, or a strange feeling in the back, neck, jaw, or upper belly or in one or both shoulders or arms. ? Lightheadedness or sudden weakness. ? A fast or irregular heartbeat. After you call 911, the  may tell you to chew 1 adult-strength or 2 to 4 low-dose aspirin. Wait for an ambulance.  Do not try to drive yourself.     · You have symptoms of a stroke. These may include:  ? Sudden numbness, tingling, weakness, or loss of movement in your face, arm, or leg, especially on only one side of your body. ? Sudden vision changes. ? Sudden trouble speaking. ? Sudden confusion or trouble understanding simple statements. ? Sudden problems with walking or balance. ? A sudden, severe headache that is different from past headaches.     · You passed out (lost consciousness). Call your doctor now or seek immediate medical care if:    · You have new or increased shortness of breath.     · You feel dizzy or lightheaded, or you feel like you may faint.     · Your heart rate becomes irregular.     · You can feel your heart flutter in your chest or skip heartbeats. Tell your doctor if these symptoms are new or worse. Watch closely for changes in your health, and be sure to contact your doctor if you have any problems. Where can you learn more? Go to http://www.gray.com/  Enter U020 in the search box to learn more about \"Atrial Fibrillation: Care Instructions. \"  Current as of: December 16, 2019               Content Version: 12.6  © 8878-5041 [employer]. Care instructions adapted under license by [employer] (which disclaims liability or warranty for this information). If you have questions about a medical condition or this instruction, always ask your healthcare professional. [place] [address] any warranty or liability for your use of this information. Patient Education        Low Blood Pressure: Care Instructions  Your Care Instructions     Blood pressure is a measurement of the force of the blood against the walls of the blood vessels during and after each beat of the heart. Low blood pressure is also called hypotension.  It means that your blood pressure is much lower than normal. Some people, especially young, slim women, may have slightly low blood pressure without symptoms. But in many people, low blood pressure can cause symptoms such as feeling dizzy or lightheaded. When your blood pressure is too low, your heart, brain, and other organs do not get enough blood. Low blood pressure can be caused by many things, including heart problems and some medicines. Diabetes that is not under control can cause your blood pressure to drop. And so can a severe allergic reaction or infection. Another cause is dehydration, which is when your body loses too much fluid. Treatment for low blood pressure depends on the cause. Follow-up care is a key part of your treatment and safety. Be sure to make and go to all appointments, and call your doctor if you are having problems. It's also a good idea to know your test results and keep a list of the medicines you take. How can you care for yourself at home? · Be safe with medicines. Call your doctor if you think you are having a problem with your medicine. You will get more details on the specific medicines your doctor prescribes. · If you feel dizzy or lightheaded, sit down or lie down for a few minutes. Or you can sit down and put your head between your knees. This will help your blood pressure go back to normal and help your symptoms go away. · Follow your doctor's suggestions for ways to prevent symptoms like dizziness. These suggestions may include:  ? Get up slowly from bed or after sitting for a long time. If you are in bed, roll to your side and swing your legs over the edge of the bed and onto the floor. Push your body up to a sitting position. Wait for a while before you slowly stand up.  ? Add more salt to your diet, if your doctor recommends it. ? Drink plenty of fluids, enough so that your urine is light yellow or clear like water. Choose water and other caffeine-free clear liquids.  If you have kidney, heart, or liver disease and have to limit fluids, talk with your doctor before you increase the amount of fluids you drink.  ? Avoid or limit alcohol to 2 drinks a day for men and 1 drink a day for women. Alcohol may interfere with your medicine. In addition, alcohol can make your low blood pressure worse by causing your body to lose water. ? Avoid or limit caffeine. Caffeine can cause your body to lose water. ? Wear compression stockings to help improve blood flow. When should you call for help? Call 911 anytime you think you may need emergency care. For example, call if:    · You passed out (lost consciousness). Call your doctor now or seek immediate medical care if:    · You are dizzy or lightheaded, or you feel like you may faint. Watch closely for changes in your health, and be sure to contact your doctor if you have any problems. Where can you learn more? Go to http://www.gray.com/  Enter C304 in the search box to learn more about \"Low Blood Pressure: Care Instructions. \"  Current as of: December 16, 2019               Content Version: 12.6  © 6241-3863 [employer]. Care instructions adapted under license by [employer] (which disclaims liability or warranty for this information). If you have questions about a medical condition or this instruction, always ask your healthcare professional. [place] [address] any warranty or liability for your use of this information. DISCHARGE SUMMARY from Nurse    PATIENT INSTRUCTIONS:    After general anesthesia or intravenous sedation, for 24 hours or while taking prescription Narcotics:  · Limit your activities  · Do not drive and operate hazardous machinery  · Do not make important personal or business decisions  · Do  not drink alcoholic beverages  · If you have not urinated within 8 hours after discharge, please contact your surgeon on call.     Report the following to your surgeon:  · Excessive pain, swelling, redness or odor of or around the surgical area  · Temperature over 100.5  · Nausea and vomiting lasting longer than 4 hours or if unable to take medications  · Any signs of decreased circulation or nerve impairment to extremity: change in color, persistent  numbness, tingling, coldness or increase pain  · Any questions    What to do at Home:  Recommended activity: Activity as tolerated,     If you experience any of the following symptoms Fever greater than 100.4, Nausea/Vomiting lasting more than 4 hours, Shortness of breath, Fainting/Dizziness please follow up with  Primary Care Provider, or go to the nearest Emergency Room. *  Please give a list of your current medications to your Primary Care Provider. *  Please update this list whenever your medications are discontinued, doses are      changed, or new medications (including over-the-counter products) are added. *  Please carry medication information at all times in case of emergency situations. These are general instructions for a healthy lifestyle:    No smoking/ No tobacco products/ Avoid exposure to second hand smoke  Surgeon General's Warning:  Quitting smoking now greatly reduces serious risk to your health. Obesity, smoking, and sedentary lifestyle greatly increases your risk for illness    A healthy diet, regular physical exercise & weight monitoring are important for maintaining a healthy lifestyle    You may be retaining fluid if you have a history of heart failure or if you experience any of the following symptoms:  Weight gain of 3 pounds or more overnight or 5 pounds in a week, increased swelling in our hands or feet or shortness of breath while lying flat in bed. Please call your doctor as soon as you notice any of these symptoms; do not wait until your next office visit. The discharge information has been reviewed with the patient and caregiver. The patient and caregiver verbalized understanding.   Discharge medications reviewed with the patient and caregiver and appropriate educational materials and side effects teaching were provided.   Patient armband removed and shredded    ___________________________________________________________________________________________________________________________________

## 2021-03-31 NOTE — DISCHARGE SUMMARY
Physician Discharge Summary       Patient: Matty Amato MRN: [id number]  SSN: [id number]    YOB: 1935  Age: 81 y.o. Sex: male    PCP: Joseph Hanks MD    Allergies: Seasonale [levonorgestrel-ethinyl estrad]    Admit date: 3/23/2021  Admitting Provider: Rebecca Estevez MD    Discharge date: 3/31/2021  Discharging Provider: Eva Green MD    * Admission Diagnoses: Fever [R50.9]  Orthostatic hypotension [I95.1]    * Discharge Diagnoses:      1. Oropharyngeal dysphagia   2. Acute on chronic diastolic CHF, compensated currently  3. Paroxysmal Atrial flutter s/p cardioversion per Dr. Prudence Damon, rate controlled. 4.   Orthostatic hypotension, slowly proving  5. Hypothyroidism   6. Hyponatremia, stable  7. SIRS due to noninfectious etiology, resolved  8. Anemia of chronic medical disease, stable  9. Former smoker  8. Recurrent urinary retention status post Talavera  11. Bacteruria, no evidence of infection, completed ceftriaxone   12. Physical deconditioning with Fall at home   13. Depression  14. Hypokalemia, better today    [facility] Course: 81 y.o male with HTN, hypothyroidism, recent Atrial flutter with controlled rate, on Eliquis, has on-going increasing shortness of breath, who underwent cardioversion in the cath lab 3/23/21 and sustained hypotension. He remained with symptomatic orthostatic hypotension after the procedure with low grade fever but no leukocytosis. He was initially admitted with resuming his Eliquis, started on Vancomycin/zosyn for suspected sepsis. His UA was dirty. His COVID-19 was negative. Cardiology started him on Propafenone with his Eliquis. Midodrine was added. Albumin was given and his metoprolol was held. His proBNP >7000, Echo with EF 55%. His orthostatic symptom slowly resolved. Over the weekend, he was had worsened proBNP and chest congestion, lasix IV was given. He tolerated well. He has been laying in bed and not participating in his care.  Family was concern for depression vs new stroke since he also was found to have dysphagia to thin liquid. CT head on admission was negative. Repeat CT head was negative. Speech has been advised on honey thickened liquid but he refused to drink this. In concern for depression, he was started on Mirtazapine which he tolerated well. He was noted to have acute urinary retention, he urine was negative for infection but he has finished 4 days of ceftriaxone. He continues to have recurrent symptom, hence saba was replaced. He was started on Tamsulosin. Patient has been participating physical therapy. He also had modified barium swallow done that showed aspiration with nectar thick liquid as well as thin liquid and patient was started on honey thick liquid with aspiration precautions. Patient was seen by palliative care team and made DNR. Patient also had hypokalemia that was repleted appropriately. Patient will be discharged to skilled nursing facility today for further care. Cardiologist has cleared him to be discharged to skilled nursing facility. * Procedures: none  * No surgery found *      Consults: Cardiology    Significant Diagnostic Studies:       Lab/Data Review:  Labs: Results:       Chemistry Recent Labs     03/31/21  0047 03/30/21  0134 03/29/21  0409   * 106* 103*   * 132* 130*   K 4.1 3.0* 3.0*   CL 98* 97* 98*   CO2 27 24 26   BUN 19* 20* 19*   CREA 0.79 0.98 0.97   BUCR 24* 20 20   AGAP 7 11 6   CA 8.0* 8.1* 8.2*     No results for input(s): TBIL, ALT, ALKP, TP, ALB, GLOB, AGRAT in the last 72 hours. No lab exists for component: SGOT   CBC w/Diff No results for input(s): WBC, RBC, HGB, HCT, MCV, MCH, MCHC, RDW, PLT, BANDS, GRANS, LYMPH, EOS, HGBEXT, HCTEXT, PLTEXT, HGBEXT, HCTEXT, PLTEXT in the last 72 hours. Coagulation No results for input(s): PTP, INR, APTT, INREXT, INREXT in the last 72 hours.     Iron/Ferritin Lab Results   Component Value Date/Time    Iron 28 (L) 03/27/2021 02:37 AM    TIBC 204 (L) 03/27/2021 02:37 AM    Iron % saturation 14 (L) 03/27/2021 02:37 AM    Ferritin 761 (H) 03/27/2021 02:37 AM       BNP    Cardiac Enzymes Lab Results   Component Value Date/Time     03/23/2021 03:20 PM    CK - MB 1.6 03/23/2021 03:20 PM    CK-MB Index 1.0 03/23/2021 03:20 PM    Troponin-I, QT 0.03 03/23/2021 03:20 PM        Lactic Acid    Thyroid Studies          All Micro Results     Procedure Component Value Units Date/Time    CULTURE, URINE [[id number]] Collected: 03/28/21 1500    Order Status: Completed Specimen: Cath Urine Updated: 03/29/21 1520     Special Requests: NO SPECIAL REQUESTS        Culture result: No growth (<1,000 CFU/ML)       CULTURE, BLOOD [[id number]] Collected: 03/23/21 1956    Order Status: Completed Specimen: Blood Updated: 03/29/21 0730     Special Requests: NO SPECIAL REQUESTS        Culture result: NO GROWTH 6 DAYS       CULTURE, BLOOD [[id number]] Collected: 03/23/21 1510    Order Status: Completed Specimen: Blood Updated: 03/29/21 0730     Special Requests: NO SPECIAL REQUESTS        Culture result: NO GROWTH 6 DAYS       C. DIFFICILE AG & TOXIN A/B [[id number]]     Order Status: Canceled Specimen: Stool     CULTURE, URINE [[id number]] Collected: 03/25/21 0730    Order Status: Completed Specimen: Urine from Clean catch Updated: 03/26/21 1042     Special Requests: NO SPECIAL REQUESTS        Culture result: No growth (<1,000 CFU/ML)       CULTURE, URINE [[id number]] Collected: 03/24/21 1100    Order Status: Canceled Specimen: Urine from Clean catch     COVID-19 RAPID TEST [[id number]] Collected: 03/24/21 0126    Order Status: Completed Specimen: Nasopharyngeal Updated: 03/24/21 0306     Specimen source Nasopharyngeal        COVID-19 rapid test Not detected        Comment: Rapid Abbott ID Now       Rapid NAAT:  The specimen is NEGATIVE for SARS-CoV-2, the novel coronavirus associated with COVID-19.        Negative results should be treated as presumptive and, if inconsistent with clinical signs and symptoms or necessary for patient management, should be tested with an alternative molecular assay. Negative results do not preclude SARS-CoV-2 infection and should not be used as the sole basis for patient management decisions. This test has been authorized by the FDA under an Emergency Use Authorization (EUA) for use by authorized laboratories. Fact sheet for Healthcare Providers: [employer]  Fact sheet for Patients: [employer]       Methodology: Isothermal Nucleic Acid Amplification                 Images:    CT (Most Recent). CT Results (most recent):  Results from Hospital Encounter encounter on 03/23/21   CT HEAD WO CONT    Narrative CT Of The Head Without Contrast    CPT CODE: 84275    REASON FOR EXAM: \"Stroke\"    COMPARISON: 3/23/2021 head CT    TECHNIQUE:  Axial CT images of the head from the skull base to the vertex  without IV contrast. CT dose reduction was achieved through use of a  standardized protocol tailored for this examination and automatic exposure  control for dose modulation. FINDINGS:   Moderate amount of streak artifacts through the lower brain without repeated  images. Results in a loss of details particularly through the brainstem and  cerebellum. No abnormal extra-axial fluid collections or findings of acute intracranial  hemorrhage. Mild global cerebral and cerebellar volume loss not remarkable  relative the age. No hydrocephalus. Periventricular white matter lucencies  consistent with mild chronic microvascular ischemic changes are stable from  previous CT head. No findings of an acute or chronic infarct. No mass or midline  shift. No significant paranasal sinus disease. Opacified right posterior ethmoid  cell is unchanged, sinuses are otherwise clear. Impression 1.  No acute intracranial abnormality or change from the prior exam.  2. Mild chronic microvascular ischemic changes in the white matter. XRAY (Most Recent)      EKG Results for orders placed or performed in visit on 09/13/19   [facility] POC EKG ROUTINE W/ 12 LEADS, INTER & REP     Status: None    Impression    See progress note. 2D ECHO 02/08/21   ECHO ADULT COMPLETE 02/09/2021 2/9/2021    Narrative · LV: Estimated LVEF is 55 - 60%. Normal cavity size and systolic function   (ejection fraction normal). Mild concentric hypertrophy. Wall motion:   normal. Age-appropriate left ventricular diastolic function. · LA: Left Atrium volume index is 30.57 mL/m2. · RV: Normal right ventricular size and function. · TV: Mild tricuspid valve regurgitation is present. · PA: Pulmonary arterial systolic pressure is 34 mmHg. · MV: Mild mitral annular calcification. Signed by: Meli Kim MD              Discharge Exam:  Please review my progress note from March 31, 2021 for further detail    * Discharge Condition: improved and fair  * Disposition: [facility])    Discharge Medications:  Current Discharge Medication List      START taking these medications    Details   furosemide (LASIX) 20 mg tablet 20 mg p.o. daily as needed for shortness of breath and/or swelling of the legs  Qty: 15 Tab, Refills: 0      ferrous sulfate 325 mg (65 mg iron) tablet Take 1 Tab by mouth Daily (before breakfast). Qty: 15 Tab, Refills: 0      tamsulosin (FLOMAX) 0.4 mg capsule Take 1 Cap by mouth nightly. Qty: 30 Cap, Refills: 0      mirtazapine (REMERON) 30 mg tablet Take 1 Tab by mouth nightly. Indications: major depressive disorder  Qty: 30 Tab, Refills: 0      midodrine (PROAMATINE) 10 mg tablet Take 1 Tab by mouth three (3) times daily (with meals) for 30 days. Qty: 90 Tab, Refills: 0      metoprolol tartrate (LOPRESSOR) 25 mg tablet Take 0.5 Tabs by mouth two (2) times a day. Qty: 30 Tab, Refills: 0      melatonin, rapid dissolve, 5 mg TbDi tablet Take 1 Tab by mouth nightly.   Qty: 30 Tab, Refills: 0      MV & Min#11-Folic Acid 5 mg tab Take 1 Tab by mouth daily. Qty: 30 Tab, Refills: 0         CONTINUE these medications which have NOT CHANGED    Details   timolol (TIMOPTIC) 0.25 % ophthalmic solution Administer 1 Drop to both eyes two (2) times a day. travoprost (TRAVATAN Z) 0.004 % ophthalmic solution Administer 1 Drop to both eyes every evening. apixaban (ELIQUIS) 5 mg tablet Take 1 Tab by mouth two (2) times a day. Qty: 180 Tab, Refills: 1    Associated Diagnoses: Atrial flutter, unspecified type (HCC)      docusate sodium (STOOL SOFTENER PO) Take  by mouth.      levothyroxine (SYNTHROID) 25 mcg tablet Take 25 mcg by mouth Daily (before breakfast). biotin 1,000 mcg chew Take  by mouth. ASCORBATE CALCIUM (RUDY-C PO) Take  by mouth. cholecalciferol (VITAMIN D3) 1,000 unit cap Take  by mouth daily. co-enzyme Q-10 (CO Q-10) 100 mg capsule Take 100 mg by mouth daily. STOP taking these medications       propafenone (RYTHMOL) 150 mg tablet Comments:   Reason for Stopping:         MILK THISTLE PO Comments:   Reason for Stopping:         alfuzosin SR (UROXATRAL) 10 mg SR tablet Comments:   Reason for Stopping:         zinc 50 mg tab tablet Comments:   Reason for Stopping:               * Follow-up Care/Patient Instructions: Activity: PT/OT Eval and Treat, fall precaution. Diet: Cardiac Diet, honey thick liquid, fluid restrictions 1500 cc a day. Dietitian and speech therapist to follow. Aspiration precaution.   Wound Care: None needed  Please remove Talavera catheter after 10 days from now once he becomes more functional.    Follow-up Information     Follow up With Specialties Details Why Contact Info    Elie Reddy MD Internal Medicine   [address] [facility] [phone]          Follow-up Appointments   Procedures    FOLLOW UP VISIT Appointment in: Other ([address]) [facility] doctor to follow this patient Follow-up with Dr. Lalita Gould in 2 weeks With  Carley Forrester, urologist in 2 weeks for urinary retention     Nursing home doctor to follow this patient  Follow-up with Dr. Janet Chowdhury in 2 weeks  With Dr. Carley Forrester urologist in 2 weeks for urinary retention     Standing Status:   Standing     Number of Occurrences:   1     Order Specific Question:   Appointment in     Answer: Other (Specify)       Total time to take care of this patient was 35 minutes and more than 50% of time was spent counseling and coordinating care. Disclaimer: Sections of this note are dictated using utilizing voice recognition software, which may have resulted in some phonetic based errors in grammar and contents. Even though attempts were made to correct all the mistakes, some may have been missed, and remained in the body of the document. If questions arise, please contact our department.       Signed:  Jose G Castro MD  3/31/2021  11:19 AM

## 2021-03-31 NOTE — PROGRESS NOTES
[facility]      CARDIOLOGY PROGRESS NOTE  RECS:      1. Atrial flutter, paroxysmal: Status post cardioversion 3/23/2021.back on atrial flutter rate controlled. Controled with Eliquis for stroke prevention. Propafenone discontinued 3/29/21. Continue low dose bb. 3.2 second pause 3/27/21 @13:36. No further pauses noted   2. Orthostatic hypotension with history of falls- continue to have orthostatics changes. Increased midodrine. Continue to Check orthostatic 2 times a day. Continue compression stockings. 3. 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 Watch on telemetry for any advanced AV blocks-stable   4. Congestive heart failure, chronic diastolic- compensated now. Continue Lasix po prn- recent limited echo shows EF 55%  5. Fever: Possible sepsis. Resolved   6. Dysphagia- being follow by ST on honey thick liquids and aspiration precautions   7. UTI/urinary retention- requiring indwelling catheter- tx per medical team   8. Severe deconditioning- continue with PT. Out of bed if able. 9. Hypokalemia- resolved. CHF has resolved and now patient has no edema. He is hemodynamically stable when supine but still has orthostatic changes. Increase midodrine. Encourage to increase p.o. intake. For atrial fibrillation, control the heart rate and continue anticoagulation. Palliative care was consulted. Continue supportive care. Patient is also DNR now. Discharge per medicine.     EKG Results     Procedure 720 Value Units Date/Time    EKG, 12 LEAD, SUBSEQUENT [[id number]] Collected: 03/28/21 1430    Order Status: Completed Updated: 03/29/21 1131     Ventricular Rate 63 BPM      Atrial Rate 252 BPM      QRS Duration 152 ms      Q-T Interval 422 ms      QTC Calculation (Bezet) 431 ms      Calculated P Axis 116 degrees      Calculated R Axis -49 degrees      Calculated T Axis -5 degrees      Diagnosis --     Atrial flutter with 4:1 AV conduction  Right bundle branch block  Left anterior fascicular block  Bifascicular block  Abnormal ECG  When compared with ECG of 25-MAR-2021 10:34,  Atrial flutter has replaced Sinus rhythm  Confirmed by Yousuf Wiseman MD, Kristofer Vance ([id number]) on 3/29/2021 11:31:04 AM      EKG, 12 LEAD, SUBSEQUENT [[id number]] Collected: 03/25/21 1034    Order Status: Completed Updated: 03/25/21 1732     Ventricular Rate 69 BPM      Atrial Rate 69 BPM      P-R Interval 290 ms      QRS Duration 158 ms      Q-T Interval 438 ms      QTC Calculation (Bezet) 469 ms      Calculated P Axis 54 degrees      Calculated R Axis -62 degrees      Calculated T Axis 6 degrees      Diagnosis --     Sinus rhythm with 1st degree AV block  Right bundle branch block  Left anterior fascicular block  Bifascicular block  Abnormal ECG  When compared with ECG of 24-MAR-2021 09:46,  No significant change was found  Confirmed by Oscar Sanchez MD, Malaika Werner ([id number]) on 3/25/2021 5:32:10 PM      EKG, 12 LEAD, INITIAL [[id number]] Collected: 03/24/21 0946    Order Status: Completed Updated: 03/24/21 1328     Ventricular Rate 108 BPM      Atrial Rate 117 BPM      QRS Duration 136 ms      Q-T Interval 366 ms      QTC Calculation (Bezet) 490 ms      Calculated R Axis -59 degrees      Calculated T Axis 17 degrees      Diagnosis --     Suspect sinus tach with RBBB  Left axis deviation  Abnormal ECG  When compared with ECG of 23-MAR-2021 16:13,  No significant change was found  Confirmed by Cal Trevino M.D., [address] [place] [address] [id number]) on 3/24/2021 1:28:24 PM      EKG, 12 LEAD, INITIAL [[id number]] Collected: 03/23/21 1613    Order Status: Completed Updated: 03/24/21 1244     Ventricular Rate 109 BPM      Atrial Rate 109 BPM      P-R Interval 256 ms      QRS Duration 142 ms      Q-T Interval 366 ms      QTC Calculation (Bezet) 492 ms      Calculated R Axis -59 degrees      Calculated T Axis 41 degrees      Diagnosis --     Sinus tachycardia with 1st degree AV block  Right bundle branch block  Left anterior fascicular block  Bifascicular block  Septal infarct (cited on or before 12-JUL-2010)  Abnormal ECG  When compared with ECG of 23-MAR-2021 10:04,  Questionable change in initial forces of Septal leads  Confirmed by Tayla Ott M.D., Tegan Hint ([id number]) on 3/24/2021 12:43:58 PM          XR Results (most recent):      02/08/21   ECHO ADULT COMPLETE 02/09/2021 2/9/2021    Narrative · LV: Estimated LVEF is 55 - 60%. Normal cavity size and systolic function   (ejection fraction normal). Mild concentric hypertrophy. Wall motion:   normal. Age-appropriate left ventricular diastolic function. · LA: Left Atrium volume index is 30.57 mL/m2. · RV: Normal right ventricular size and function. · TV: Mild tricuspid valve regurgitation is present. · PA: Pulmonary arterial systolic pressure is 34 mmHg. · MV: Mild mitral annular calcification. Signed by: Abelino Suarez MD       02/08/21   NUCLEAR CARDIAC STRESS TEST 02/08/2021 2/8/2021    Narrative · Baseline ECG: Atrial fibrillation, atrial flutter, Nonspecific T wave   changes. · Negative stress test.  · Gated SPECT: Left ventricular function post-stress was normal.   Calculated ejection fraction is 76%. There is no evidence of transient   ischemic dilation (TID). The TID ratio is 1.5.   · Myocardial perfusion imaging supports a low risk stress test.  · Left ventricular perfusion is normal.        Signed by: Abelino Suarez MD            ASSESSMENT:  Hospital Problems  Date Reviewed: 3/23/2021          Codes Class Noted POA    Dysphagia ICD-10-CM: R13.10  ICD-9-CM: 787.20  3/30/2021 Unknown        Physical deconditioning ICD-10-CM: R53.81  ICD-9-CM: 799.3  3/30/2021 Unknown        Atrial flutter ([address] ICD-10-CM: I48.92  ICD-9-CM: 427.32  3/23/2021 Yes        * (Principal) Orthostatic hypotension ICD-10-CM: I95.1  ICD-9-CM: 458.0  3/23/2021 Unknown        Fever ICD-10-CM: R50.9  ICD-9-CM: 780.60  3/23/2021 Unknown        RBBB (right bundle branch block with left anterior fascicular block) ICD-10-CM: I45.2  ICD-9-CM: 426.52  9/13/2019 Yes        Acute on chronic diastolic congestive heart failure ([facility] [address] ICD-10-CM: I50.33  ICD-9-CM: 428.33, 428.0  7/19/2017 No    Overview Addendum 8/31/2017 12:09 PM by Dione Wei MD     8/17 no edema today; 7/17 adv to stop all herbal OTC products for now                     SUBJECTIVE:  No chest pain  SOB is the same   no c/o voiced today     OBJECTIVE:    VS:   Visit Vitals  /75 (BP 1 Location: Left upper arm, BP Patient Position: At rest)   Pulse 71   Temp 97.7 °F (36.5 °C)   Resp 17   Ht 6' (1.829 m)   Wt 94.7 kg (208 lb 11.2 oz)   SpO2 98%   BMI 28.30 kg/m²         Intake/Output Summary (Last 24 hours) at 3/31/2021 1030  Last data filed at 3/31/2021 0738  Gross per 24 hour   Intake 320 ml   Output 3000 ml   Net -2680 ml     TELE: atrial flutter rate controlled     General: alert and in no distress. weak   HENT: Normocephalic, atraumatic. Normal external eye. Eyelid edematous  Neck :  no bruit, no JVD  Cardiac:  regular rate and rhythm, tachycardia  Chest/Lungs:chest clear, no wheezing, rales. Diminished breath sounds   Abdomen: Soft, nontender, no masses  Extremities: No edema  BLE edema  peripheral pulses present      Labs: Results:       Chemistry Recent Labs     03/31/21  0047 03/30/21  0134 03/29/21  0409   * 106* 103*   * 132* 130*   K 4.1 3.0* 3.0*   CL 98* 97* 98*   CO2 27 24 26   BUN 19* 20* 19*   CREA 0.79 0.98 0.97   CA 8.0* 8.1* 8.2*   MG  --  1.7  --    AGAP 7 11 6   BUCR 24* 20 20      CBC w/Diff No results for input(s): WBC, RBC, HGB, HCT, PLT, GRANS, LYMPH, EOS, HGBEXT, HCTEXT, PLTEXT, HGBEXT, HCTEXT, PLTEXT in the last 72 hours. Cardiac Enzymes No results for input(s): CPK, CKND1, ARMEN in the last 72 hours. No lab exists for component: CKRMB, TROIP   Coagulation No results for input(s): PTP, INR, APTT, INREXT, INREXT in the last 72 hours.     Lipid Panel No results found for: CHOL, CHOLPOCT, CHOLX, CHLST, [address], [id number], HDL, HDLP, LDL, LDLC, DLDLP, 616151, VLDLC, VLDL, TGLX, TRIGL, TRIGP, TGLPOCT, CHHD, CHHDX   BNP No results for input(s): BNPP in the last 72 hours. Liver Enzymes No results for input(s): TP, ALB, TBIL, AP in the last 72 hours.     No lab exists for component: SGOT, GPT, DBIL   Digoxin    Thyroid Studies Lab Results   Component Value Date/Time    TSH 1.51 03/27/2021 02:37 AM              DOUGIE Wiggins

## 2021-03-31 NOTE — ROUTINE PROCESS
Bedside and Verbal shift change report given to Gabriela Marc LPN (oncoming nurse) by Otoniel Mackey (offgoing nurse). Report included the following information SBAR, Kardex, MAR, Recent Results and Cardiac Rhythm A Fib. Patient quietly resting, call light in reach, saba patent.

## 2021-03-31 NOTE — ROUTINE PROCESS
Patient transferred to SNF today, Patient left via ambulance.  [facility] [place]

## 2022-03-18 PROBLEM — R53.81 PHYSICAL DECONDITIONING: Status: ACTIVE | Noted: 2021-03-30

## 2022-03-18 PROBLEM — R94.31 ABNORMAL EKG: Status: ACTIVE | Noted: 2017-07-19

## 2022-03-18 PROBLEM — I50.33 ACUTE ON CHRONIC DIASTOLIC CONGESTIVE HEART FAILURE (HCC): Status: ACTIVE | Noted: 2017-07-19

## 2022-03-19 PROBLEM — R13.10 DYSPHAGIA: Status: ACTIVE | Noted: 2021-03-30

## 2022-03-19 PROBLEM — G47.9 SLEEP DISORDER: Status: ACTIVE | Noted: 2017-08-31

## 2022-03-19 PROBLEM — I48.92 ATRIAL FLUTTER (HCC): Status: ACTIVE | Noted: 2021-03-23

## 2022-03-19 PROBLEM — R50.9 FEVER: Status: ACTIVE | Noted: 2021-03-23

## 2022-03-19 PROBLEM — I45.2 RBBB (RIGHT BUNDLE BRANCH BLOCK WITH LEFT ANTERIOR FASCICULAR BLOCK): Status: ACTIVE | Noted: 2019-09-13

## 2022-03-19 PROBLEM — I95.1 ORTHOSTATIC HYPOTENSION: Status: ACTIVE | Noted: 2021-03-23

## 2023-04-26 ENCOUNTER — HOSPITAL ENCOUNTER (EMERGENCY)
Facility: HOSPITAL | Age: 88
Discharge: HOME | End: 2023-04-26
Attending: EMERGENCY MEDICINE
Payer: COMMERCIAL

## 2023-04-26 VITALS
SYSTOLIC BLOOD PRESSURE: 114 MMHG | RESPIRATION RATE: 18 BRPM | TEMPERATURE: 98.1 F | WEIGHT: 160 LBS | DIASTOLIC BLOOD PRESSURE: 66 MMHG | BODY MASS INDEX: 22.9 KG/M2 | HEART RATE: 79 BPM | OXYGEN SATURATION: 100 % | HEIGHT: 70 IN

## 2023-04-26 DIAGNOSIS — T83.511A URINARY TRACT INFECTION ASSOCIATED WITH INDWELLING URETHRAL CATHETER, INITIAL ENCOUNTER (HCC): Primary | ICD-10-CM

## 2023-04-26 DIAGNOSIS — N39.0 URINARY TRACT INFECTION ASSOCIATED WITH INDWELLING URETHRAL CATHETER, INITIAL ENCOUNTER (HCC): Primary | ICD-10-CM

## 2023-04-26 LAB
AMORPH CRY URNS QL MICRO: ABNORMAL
ANION GAP SERPL CALC-SCNC: 5 MMOL/L (ref 3–18)
APPEARANCE UR: ABNORMAL
BACTERIA URNS QL MICRO: ABNORMAL /HPF
BASOPHILS # BLD: 0 K/UL (ref 0–0.1)
BASOPHILS NFR BLD: 1 % (ref 0–2)
BILIRUB UR QL: NEGATIVE
BUN SERPL-MCNC: 21 MG/DL (ref 7–18)
BUN/CREAT SERPL: 27 (ref 12–20)
CALCIUM SERPL-MCNC: 9.3 MG/DL (ref 8.5–10.1)
CHLORIDE SERPL-SCNC: 102 MMOL/L (ref 100–111)
CO2 SERPL-SCNC: 30 MMOL/L (ref 21–32)
COLOR UR: YELLOW
CREAT SERPL-MCNC: 0.77 MG/DL (ref 0.6–1.3)
DIFFERENTIAL METHOD BLD: ABNORMAL
EOSINOPHIL # BLD: 0.3 K/UL (ref 0–0.4)
EOSINOPHIL NFR BLD: 4 % (ref 0–5)
EPITH CASTS URNS QL MICRO: ABNORMAL /LPF (ref 0–5)
ERYTHROCYTE [DISTWIDTH] IN BLOOD BY AUTOMATED COUNT: 13.2 % (ref 11.6–14.5)
GLUCOSE SERPL-MCNC: 141 MG/DL (ref 74–99)
GLUCOSE UR STRIP.AUTO-MCNC: NEGATIVE MG/DL
HCT VFR BLD AUTO: 38.5 % (ref 36–48)
HGB BLD-MCNC: 12.8 G/DL (ref 13–16)
HGB UR QL STRIP: ABNORMAL
IMM GRANULOCYTES # BLD AUTO: 0 K/UL (ref 0–0.04)
IMM GRANULOCYTES NFR BLD AUTO: 0 % (ref 0–0.5)
KETONES UR QL STRIP.AUTO: NEGATIVE MG/DL
LEUKOCYTE ESTERASE UR QL STRIP.AUTO: ABNORMAL
LYMPHOCYTES # BLD: 0.9 K/UL (ref 0.9–3.6)
LYMPHOCYTES NFR BLD: 12 % (ref 21–52)
MCH RBC QN AUTO: 30 PG (ref 24–34)
MCHC RBC AUTO-ENTMCNC: 33.2 G/DL (ref 31–37)
MCV RBC AUTO: 90.2 FL (ref 78–100)
MONOCYTES # BLD: 0.5 K/UL (ref 0.05–1.2)
MONOCYTES NFR BLD: 7 % (ref 3–10)
NEUTS SEG # BLD: 5.7 K/UL (ref 1.8–8)
NEUTS SEG NFR BLD: 76 % (ref 40–73)
NITRITE UR QL STRIP.AUTO: POSITIVE
NRBC # BLD: 0 K/UL (ref 0–0.01)
NRBC BLD-RTO: 0 PER 100 WBC
PH UR STRIP: 7.5 (ref 5–8)
PLATELET # BLD AUTO: 263 K/UL (ref 135–420)
PMV BLD AUTO: 10.1 FL (ref 9.2–11.8)
POTASSIUM SERPL-SCNC: 3.7 MMOL/L (ref 3.5–5.5)
PROT UR STRIP-MCNC: 100 MG/DL
RBC # BLD AUTO: 4.27 M/UL (ref 4.35–5.65)
RBC #/AREA URNS HPF: ABNORMAL /HPF (ref 0–5)
SODIUM SERPL-SCNC: 137 MMOL/L (ref 136–145)
SP GR UR REFRACTOMETRY: 1.02 (ref 1–1.03)
TRI-PHOS CRY URNS QL MICRO: ABNORMAL
UROBILINOGEN UR QL STRIP.AUTO: 1 EU/DL (ref 0.2–1)
WBC # BLD AUTO: 7.6 K/UL (ref 4.6–13.2)
WBC URNS QL MICRO: ABNORMAL /HPF (ref 0–4)

## 2023-04-26 PROCEDURE — 51702 INSERT TEMP BLADDER CATH: CPT

## 2023-04-26 PROCEDURE — 6360000002 HC RX W HCPCS: Performed by: EMERGENCY MEDICINE

## 2023-04-26 PROCEDURE — 81001 URINALYSIS AUTO W/SCOPE: CPT

## 2023-04-26 PROCEDURE — 96374 THER/PROPH/DIAG INJ IV PUSH: CPT

## 2023-04-26 PROCEDURE — 85025 COMPLETE CBC W/AUTO DIFF WBC: CPT

## 2023-04-26 PROCEDURE — 2580000003 HC RX 258: Performed by: EMERGENCY MEDICINE

## 2023-04-26 PROCEDURE — 80048 BASIC METABOLIC PNL TOTAL CA: CPT

## 2023-04-26 PROCEDURE — 99284 EMERGENCY DEPT VISIT MOD MDM: CPT

## 2023-04-26 RX ORDER — CEPHALEXIN 500 MG/1
500 CAPSULE ORAL 4 TIMES DAILY
Qty: 28 CAPSULE | Refills: 0 | Status: SHIPPED | OUTPATIENT
Start: 2023-04-26 | End: 2023-05-03

## 2023-04-26 RX ADMIN — WATER 1000 MG: 1 INJECTION INTRAMUSCULAR; INTRAVENOUS; SUBCUTANEOUS at 18:23

## 2023-04-26 ASSESSMENT — PAIN - FUNCTIONAL ASSESSMENT: PAIN_FUNCTIONAL_ASSESSMENT: NONE - DENIES PAIN

## 2023-04-26 NOTE — ED NOTES
Pt is seen and discharged by provider. Pending transport. Transport set up by .      Lonnie Vivas, MIREYA  04/26/23 0142

## 2023-04-26 NOTE — ED NOTES
Jack catheter inserted aseptically. Urine sample sent to lab. Noted with small amount of blood coming from meatus.      Luann Esparza RN  04/26/23 [id number]

## 2023-04-26 NOTE — ED PROVIDER NOTES
[facility] EMERGENCY DEPT  EMERGENCY DEPARTMENT ENCOUNTER    Patient Name: Leonid Weber  MRN: [id number]  YOB: 1935  Provider: Kan Guerra MD  PCP: Alexis Carmichael MD   Time/Date of evaluation: 4:35 PM EDT on 23    History of Presenting Illness     History Provided by: Patient  History is limited by: Nothing     HISTORY:   Leonid Weber is a 81 y.o. male presenting with hematuria. He is a little unclear about what exactly happened but he was having his Jack catheter exchanged today and had some blood in the meatus of his penis. It is unclear whether there was just blood around the catheter or whether there was actual hematuria. Denies any penile or abdominal pain. He does have a chronic Jack catheter due to severe BPH. He currently is on Eliquis for atrial fibrillation. He denies any other complaints at this time. Nursing Notes were all reviewed and agreed with or any disagreements were addressed in the HPI. Past History     PAST MEDICAL HISTORY:  Past Medical History:   Diagnosis Date    Unspecified essential hypertension 2017       PAST SURGICAL HISTORY:  Past Surgical History:   Procedure Laterality Date    BACK SURGERY      CARDIOVERSION ELECTIVE ARRHYTHMIA EXTERNAL N/A 3/23/2021    EP CARDIOVERSION performed by Enedina Burns MD at [facility] LAB       FAMILY HISTORY:  Family History   Problem Relation Age of Onset    Stroke Neg Hx     Heart Attack Neg Hx     Cancer Sister        SOCIAL HISTORY:  Social History     Tobacco Use    Smoking status: Former     Types: Cigarettes     Quit date: 1982     Years since quittin.7    Smokeless tobacco: Never   Substance Use Topics    Alcohol use: No    Drug use: No       MEDICATIONS:  No current facility-administered medications for this encounter.      Current Outpatient Medications   Medication Sig Dispense Refill    apixaban (ELIQUIS) 5 MG TABS tablet Take 5 mg by mouth 2 times daily      Biotin 1000 MCG CHEW

## 2025-04-07 NOTE — PROGRESS NOTES
April 7, 2025       TO: Makenzie Blanco  [id number] [facility] [address]       Dear Makenzie Blanco,    We recently received a call from your pharmacy requesting a refill of your medication(s).    Our records indicate that you are due for follow-up with your Heart Care Provider. We will refill your medications for 3 months which will allow you enough time to be seen.    Please call [phone] to schedule your appointment.    Thank you for allowing [facility] to be a part of your health care team and we look forward to seeing you soon.    Thank you,    [facility]   Discussed with the ER attending, Dr. Isaiah Townsend. Continue Eliquis and ll propafenone. Continue IV fluids as ordered. Patient seems to have low-grade fever and work-up is being done by him.

## (undated) DEVICE — SET EPI 18GA L3.5IN TUOHY NDL W/ 20GA CLS TIP NYL CATH

## (undated) DEVICE — SYR 10ML LUER LOK 1/5ML GRAD --

## (undated) DEVICE — (D)BNDG ADHESIVE FABRIC 3/4X3 -- DISC BY MFR USE ITEM 357960

## (undated) DEVICE — Device: Brand: MEDEX